# Patient Record
Sex: FEMALE | Race: WHITE | Employment: FULL TIME | URBAN - METROPOLITAN AREA
[De-identification: names, ages, dates, MRNs, and addresses within clinical notes are randomized per-mention and may not be internally consistent; named-entity substitution may affect disease eponyms.]

---

## 2015-01-01 LAB — PAP SMEAR - HIM PATIENT REPORTED: NEGATIVE

## 2017-01-01 LAB — PAP SMEAR - HIM PATIENT REPORTED: NEGATIVE

## 2017-03-23 ENCOUNTER — MYC MEDICAL ADVICE (OUTPATIENT)
Dept: ENDOCRINOLOGY | Facility: CLINIC | Age: 52
End: 2017-03-23

## 2017-03-23 DIAGNOSIS — E66.9 ADULT-ONSET OBESITY: ICD-10-CM

## 2017-03-24 RX ORDER — PHENTERMINE HYDROCHLORIDE 15 MG/1
15 CAPSULE ORAL DAILY
Qty: 30 CAPSULE | Refills: 5
Start: 2017-03-24 | End: 2017-09-25

## 2017-03-24 NOTE — TELEPHONE ENCOUNTER
OK per Dr. Blackmon to send Rx for Phentermine #30 5 refills. Phentermine can't be refilled with 6 refills.   Carlee Iniguez RN, BSN

## 2017-03-30 ENCOUNTER — MYC MEDICAL ADVICE (OUTPATIENT)
Dept: ENDOCRINOLOGY | Facility: CLINIC | Age: 52
End: 2017-03-30

## 2017-06-03 ENCOUNTER — HEALTH MAINTENANCE LETTER (OUTPATIENT)
Age: 52
End: 2017-06-03

## 2017-09-25 ENCOUNTER — OFFICE VISIT (OUTPATIENT)
Dept: ENDOCRINOLOGY | Facility: CLINIC | Age: 52
End: 2017-09-25

## 2017-09-25 VITALS
SYSTOLIC BLOOD PRESSURE: 127 MMHG | BODY MASS INDEX: 25.5 KG/M2 | HEIGHT: 60 IN | WEIGHT: 129.9 LBS | DIASTOLIC BLOOD PRESSURE: 76 MMHG | HEART RATE: 89 BPM | OXYGEN SATURATION: 100 %

## 2017-09-25 DIAGNOSIS — E66.9 ADULT-ONSET OBESITY: ICD-10-CM

## 2017-09-25 RX ORDER — PHENTERMINE HYDROCHLORIDE 15 MG/1
15 CAPSULE ORAL DAILY
Qty: 30 CAPSULE | Refills: 5 | Status: SHIPPED | OUTPATIENT
Start: 2017-09-25 | End: 2018-03-19

## 2017-09-25 ASSESSMENT — PAIN SCALES - GENERAL: PAINLEVEL: NO PAIN (0)

## 2017-09-25 NOTE — MR AVS SNAPSHOT
After Visit Summary   9/25/2017    Margo Chris    MRN: [id number]           Patient Information     Date Of Birth          1965        Visit Information        Provider Department      9/25/2017 8:15 AM Alvaro Blackmon MD [facility] Medical Weight Management        Today's Diagnoses     Adult-onset obesity           Follow-ups after your visit        Follow-up notes from your care team     Return in about 3 months (around 12/25/2017).      Who to contact     Please call your clinic at [phone] to:    Ask questions about your health    Make or cancel appointments    Discuss your medicines    Learn about your test results    Speak to your doctor   If you have compliments or concerns about an experience at your clinic, or if you wish to file a complaint, please contact [facility] Patient Relations at [phone] or email us at [email or web address]         Additional Information About Your Visit        MyChart Information     invit gives you secure access to your electronic health record. If you see a primary care provider, you can also send messages to your care team and make appointments. If you have questions, please call your primary care clinic.  If you do not have a primary care provider, please call [phone] and they will assist you.      [employer] is an electronic gateway that provides easy, online access to your medical records. With [employer], you can request a clinic appointment, read your test results, renew a prescription or communicate with your care team.     To access your existing account, please contact your [facility] or call [phone] for assistance.        Care EveryWhere ID     This is your Care EveryWhere ID. This could be used by other organizations to access your [facility] medical records  [id number]        Your Vitals Were     Pulse Height Pulse Oximetry BMI (Body Mass Index)           89 1.524 m (5') 100% 25.37 kg/m2         Blood Pressure from Last 3 Encounters:   09/25/17 127/76   09/19/16 128/74   01/22/16 134/82    Weight from Last 3 Encounters:   09/25/17 58.9 kg (129 lb 14.4 oz)   09/19/16 60.6 kg (133 lb 8 oz)   01/22/16 66.2 kg (145 lb 14.4 oz)              Today, you had the following     No orders found for display         Where to get your medicines      Some of these will need a paper prescription and others can be bought over the counter.  Ask your nurse if you have questions.     Bring a paper prescription for each of these medications     phentermine 15 MG capsule          Primary Care Provider    None Specified       No primary provider on file.        Equal Access to Services     NOEMY MARTINEZ : Naun Grissom, kenan mishra, aguilar deal, gopi damian. So [facility] [phone].    ATENCIÓN: Si habla español, tiene a silva disposición servicios gratuitos de asistencia lingüística. Llame al [phone].    We comply with applicable federal civil rights laws and [place] laws. We do not discriminate on the basis of race, color, national origin, age, disability sex, sexual orientation or gender identity.            Thank you!     Thank you for choosing [facility]NAGEMENT  for your care. Our goal is always to provide you with excellent care. Hearing back from our patients is one way we can continue to improve our services. Please take a few minutes to complete the written survey that you may receive in the mail after your visit with us. Thank you!             Your Updated Medication List - Protect others around you: Learn how to safely use, store and throw away your medicines at www.disposemymeds.org.          This list is accurate as of: 9/25/17  9:05 AM.  Always use your most recent med list.                   Brand Name Dispense Instructions for use Diagnosis    phentermine 15 MG capsule     30 capsule    Take  1 capsule (15 mg) by mouth daily    Adult-onset obesity

## 2017-09-25 NOTE — PROGRESS NOTES
Return Medical Weight Management Note     Margo Chris  MRN:  [id number]  :  1965  RAUL:  2017    Dear Colleague,    I had the pleasure of seeing your patient Margo Chris.  She is a 50 year old female who I am continuing to see for treatment of obesity related to:Hyperlipidemia    CURRENT WEIGHT:   129 lbs 14.4 oz    Wt Readings from Last 4 Encounters:   17 58.9 kg (129 lb 14.4 oz)   16 60.6 kg (133 lb 8 oz)   16 66.2 kg (145 lb 14.4 oz)   09/18/15 69.6 kg (153 lb 6.4 oz)     Body Mass Index:  Body mass index is 25.37 kg/(m^2).  Vitals:  B/P: 134/82, P: 83    Initial consult weight was 163 on 12/3/10.  Weight change since last seen on 16 is down 4 pounds.   Total loss is 22 pounds.    INTERVAL HISTORY:  Patient has been working on the following dietary changes:  Goal 1200 sarah and feels does better on phentermine; she feels main effect of phentermine has been to increase her metabolism.   Patient has been working on the following activity changes: works out 4-5 days a week 30 min if rigorous and 45 min if moderate    Diet and Activity Changes Since Last Visit Reviewed With Patient 2017   I have made the following changes to my diet since my last visit: none   With regards to my diet, I am still struggling with: nothing   For breakfast, I typically eat: PB&J on whole wheat   For lunch, I typically eat: Lean Cuisine   For supper, I typically eat: Salad   For snack(s), I typically eat: greek yogurt   I have made the following changes to my activity/exercise since my last visit: yoga   With regards to my activity/exercise, I am still struggling with: nothing     MEDICATIONS:   Current Outpatient Prescriptions   Medication     phentermine 15 MG capsule     No current facility-administered medications for this visit.        Weight Loss Medication History Reviewed With Patient 2017   Which weight loss medications are you currently taking on a regular basis?   Phentermine   Are you having any side effects from the weight loss medication that we have prescribed you? No       ASSESSMENT:   She feels in good control on phentermine, no AEs, wants to continue.    FOLLOW-UP:    6 months.  10/15 minutes spent on counseling and education     Sincerely,    Alvaro Blackmon MD

## 2017-09-25 NOTE — LETTER
2017       RE: Margo Chris  [address]   [place] [address]     Dear Colleague,    Thank you for referring your patient, Margo Chris, to the [facility] at [facility]. Please see a copy of my visit note below.        Return Medical Weight Management Note     Margo Chris  MRN:  [id number]  :  1965  RAUL:  2017    Dear Colleague,    I had the pleasure of seeing your patient Margo Chris.  She is a 50 year old female who I am continuing to see for treatment of obesity related to:Hyperlipidemia    CURRENT WEIGHT:   129 lbs 14.4 oz    Wt Readings from Last 4 Encounters:   17 58.9 kg (129 lb 14.4 oz)   16 60.6 kg (133 lb 8 oz)   16 66.2 kg (145 lb 14.4 oz)   09/18/15 69.6 kg (153 lb 6.4 oz)     Body Mass Index:  Body mass index is 25.37 kg/(m^2).  Vitals:  B/P: 134/82, P: 83    Initial consult weight was 163 on 12/3/10.  Weight change since last seen on 16 is down 4 pounds.   Total loss is 22 pounds.    INTERVAL HISTORY:  Patient has been working on the following dietary changes:  Goal 1200 sarah and feels does better on phentermine; she feels main effect of phentermine has been to increase her metabolism.   Patient has been working on the following activity changes: works out 4-5 days a week 30 min if rigorous and 45 min if moderate    Diet and Activity Changes Since Last Visit Reviewed With Patient 2017   I have made the following changes to my diet since my last visit: none   With regards to my diet, I am still struggling with: nothing   For breakfast, I typically eat: PB&J on whole wheat   For lunch, I typically eat: Lean Cuisine   For supper, I typically eat: Salad   For snack(s), I typically eat: greek yogurt   I have made the following changes to my activity/exercise since my last visit: yoga   With regards to my activity/exercise, I am still struggling with: nothing      MEDICATIONS:   Current Outpatient Prescriptions   Medication     phentermine 15 MG capsule     No current facility-administered medications for this visit.        Weight Loss Medication History Reviewed With Patient 9/11/2017   Which weight loss medications are you currently taking on a regular basis?  Phentermine   Are you having any side effects from the weight loss medication that we have prescribed you? No       ASSESSMENT:   She feels in good control on phentermine, no AEs, wants to continue.    FOLLOW-UP:    6 months.  10/15 minutes spent on counseling and education     Sincerely,    Alvaro Blackmon MD    Again, thank you for allowing me to participate in the care of your patient.      Sincerely,    Alvaro Blackmon MD

## 2017-09-25 NOTE — NURSING NOTE
(   Chief Complaint   Patient presents with     Refill Request     prescription refill     )    ( Weight: 58.9 kg (129 lb 14.4 oz) )  ( Height: 152.4 cm (5') )  ( BMI (Calculated): 25.42 )  (   )  (   )  (   )  (   )  (   )  (   )    ( BP: 127/76 )  (   )  (   )  (   )  ( Pulse: 89 )  (   )  ( SpO2: 100 % )    (   Patient Active Problem List   Diagnosis     Chronic fatigue syndrome     Hyperlipidemia     Obesity    )  (   Current Outpatient Prescriptions   Medication Sig Dispense Refill     phentermine 15 MG capsule Take 1 capsule (15 mg) by mouth daily 30 capsule 5    )  ( Diabetes Eval:    )    ( Pain Eval:  No Pain (0) )    ( Wound Eval:       )    (   History   Smoking Status     Never Smoker   Smokeless Tobacco     Not on file    )    ( Signed By:  Peter Canela; September 25, 2017; 8:21 AM )

## 2017-09-25 NOTE — LETTER
Date:September 26, 2017      Patient was self referred, no letter generated. Do not send.        [facility] Health Information

## 2018-01-08 ENCOUNTER — TRANSFERRED RECORDS (OUTPATIENT)
Dept: HEALTH INFORMATION MANAGEMENT | Facility: CLINIC | Age: 53
End: 2018-01-08

## 2018-02-15 RX ORDER — MULTIPLE VITAMINS W/ MINERALS TAB 9MG-400MCG
1 TAB ORAL DAILY
Status: ON HOLD | COMMUNITY
End: 2018-02-16

## 2018-02-15 NOTE — OR NURSING
Patient on phentermine. Spoke with Dr. Connelly and ok to proceed with surgery for tomorrow even if hasn't been stopped.

## 2018-02-16 ENCOUNTER — ANESTHESIA (OUTPATIENT)
Dept: SURGERY | Facility: CLINIC | Age: 53
End: 2018-02-16
Payer: COMMERCIAL

## 2018-02-16 ENCOUNTER — SURGERY (OUTPATIENT)
Age: 53
End: 2018-02-16
Payer: COMMERCIAL

## 2018-02-16 ENCOUNTER — ANESTHESIA EVENT (OUTPATIENT)
Dept: SURGERY | Facility: CLINIC | Age: 53
End: 2018-02-16
Payer: COMMERCIAL

## 2018-02-16 ENCOUNTER — HOSPITAL ENCOUNTER (OUTPATIENT)
Facility: CLINIC | Age: 53
Discharge: HOME | End: 2018-02-17
Attending: OBSTETRICS & GYNECOLOGY | Admitting: OBSTETRICS & GYNECOLOGY
Payer: COMMERCIAL

## 2018-02-16 DIAGNOSIS — Z90.710 S/P LAPAROSCOPIC ASSISTED VAGINAL HYSTERECTOMY (LAVH): Primary | ICD-10-CM

## 2018-02-16 LAB
ABO + RH BLD: NORMAL
ABO + RH BLD: NORMAL
BLD GP AB SCN SERPL QL: NORMAL
BLOOD BANK CMNT PATIENT-IMP: NORMAL
HCG UR QL: NEGATIVE
HGB BLD-MCNC: 13.8 G/DL (ref 11.7–15.7)
SPECIMEN EXP DATE BLD: NORMAL

## 2018-02-16 PROCEDURE — 86901 BLOOD TYPING SEROLOGIC RH(D): CPT | Performed by: OBSTETRICS & GYNECOLOGY

## 2018-02-16 PROCEDURE — 25000128 H RX IP 250 OP 636

## 2018-02-16 PROCEDURE — 25000128 H RX IP 250 OP 636: Performed by: OBSTETRICS & GYNECOLOGY

## 2018-02-16 PROCEDURE — 27210794 ZZH OR GENERAL SUPPLY STERILE: Performed by: OBSTETRICS & GYNECOLOGY

## 2018-02-16 PROCEDURE — 25000125 ZZHC RX 250: Performed by: NURSE ANESTHETIST, CERTIFIED REGISTERED

## 2018-02-16 PROCEDURE — 25000132 ZZH RX MED GY IP 250 OP 250 PS 637: Performed by: OBSTETRICS & GYNECOLOGY

## 2018-02-16 PROCEDURE — 37000009 ZZH ANESTHESIA TECHNICAL FEE, EACH ADDTL 15 MIN: Performed by: OBSTETRICS & GYNECOLOGY

## 2018-02-16 PROCEDURE — 86850 RBC ANTIBODY SCREEN: CPT | Performed by: OBSTETRICS & GYNECOLOGY

## 2018-02-16 PROCEDURE — 37000008 ZZH ANESTHESIA TECHNICAL FEE, 1ST 30 MIN: Performed by: OBSTETRICS & GYNECOLOGY

## 2018-02-16 PROCEDURE — 88307 TISSUE EXAM BY PATHOLOGIST: CPT | Mod: 26 | Performed by: OBSTETRICS & GYNECOLOGY

## 2018-02-16 PROCEDURE — 40000170 ZZH STATISTIC PRE-PROCEDURE ASSESSMENT II: Performed by: OBSTETRICS & GYNECOLOGY

## 2018-02-16 PROCEDURE — 25000128 H RX IP 250 OP 636: Performed by: ANESTHESIOLOGY

## 2018-02-16 PROCEDURE — 25000566 ZZH SEVOFLURANE, EA 15 MIN: Performed by: OBSTETRICS & GYNECOLOGY

## 2018-02-16 PROCEDURE — 40000936 ZZH STATISTIC OUTPATIENT (NON-OBS) NIGHT

## 2018-02-16 PROCEDURE — 86900 BLOOD TYPING SEROLOGIC ABO: CPT | Performed by: OBSTETRICS & GYNECOLOGY

## 2018-02-16 PROCEDURE — 88307 TISSUE EXAM BY PATHOLOGIST: CPT | Performed by: OBSTETRICS & GYNECOLOGY

## 2018-02-16 PROCEDURE — 25800025 ZZH RX 258: Performed by: OBSTETRICS & GYNECOLOGY

## 2018-02-16 PROCEDURE — 71000012 ZZH RECOVERY PHASE 1 LEVEL 1 FIRST HR: Performed by: OBSTETRICS & GYNECOLOGY

## 2018-02-16 PROCEDURE — 81025 URINE PREGNANCY TEST: CPT | Performed by: OBSTETRICS & GYNECOLOGY

## 2018-02-16 PROCEDURE — 36415 COLL VENOUS BLD VENIPUNCTURE: CPT | Performed by: OBSTETRICS & GYNECOLOGY

## 2018-02-16 PROCEDURE — 36000093 ZZH SURGERY LEVEL 4 1ST 30 MIN: Performed by: OBSTETRICS & GYNECOLOGY

## 2018-02-16 PROCEDURE — 25000128 H RX IP 250 OP 636: Performed by: NURSE ANESTHETIST, CERTIFIED REGISTERED

## 2018-02-16 PROCEDURE — 40000935 ZZH STATISTIC OUTPATIENT (NON-OBS) EVE

## 2018-02-16 PROCEDURE — 40000934 ZZH STATISTIC OUTPATIENT (NON-OBS) DAY

## 2018-02-16 PROCEDURE — 85018 HEMOGLOBIN: CPT | Performed by: OBSTETRICS & GYNECOLOGY

## 2018-02-16 PROCEDURE — 36000063 ZZH SURGERY LEVEL 4 EA 15 ADDTL MIN: Performed by: OBSTETRICS & GYNECOLOGY

## 2018-02-16 PROCEDURE — 27210995 ZZH RX 272: Performed by: OBSTETRICS & GYNECOLOGY

## 2018-02-16 PROCEDURE — 71000013 ZZH RECOVERY PHASE 1 LEVEL 1 EA ADDTL HR: Performed by: OBSTETRICS & GYNECOLOGY

## 2018-02-16 RX ORDER — PHENAZOPYRIDINE HYDROCHLORIDE 200 MG/1
200 TABLET, FILM COATED ORAL ONCE
Status: COMPLETED | OUTPATIENT
Start: 2018-02-16 | End: 2018-02-16

## 2018-02-16 RX ORDER — METOCLOPRAMIDE HYDROCHLORIDE 5 MG/ML
10 INJECTION INTRAMUSCULAR; INTRAVENOUS EVERY 6 HOURS PRN
Status: DISCONTINUED | OUTPATIENT
Start: 2018-02-16 | End: 2018-02-17 | Stop reason: HOSPADM

## 2018-02-16 RX ORDER — MEPERIDINE HYDROCHLORIDE 25 MG/ML
12.5 INJECTION INTRAMUSCULAR; INTRAVENOUS; SUBCUTANEOUS
Status: DISCONTINUED | OUTPATIENT
Start: 2018-02-16 | End: 2018-02-16

## 2018-02-16 RX ORDER — ONDANSETRON 4 MG/1
4 TABLET, ORALLY DISINTEGRATING ORAL EVERY 30 MIN PRN
Status: DISCONTINUED | OUTPATIENT
Start: 2018-02-16 | End: 2018-02-16

## 2018-02-16 RX ORDER — SODIUM CHLORIDE, SODIUM LACTATE, POTASSIUM CHLORIDE, CALCIUM CHLORIDE 600; 310; 30; 20 MG/100ML; MG/100ML; MG/100ML; MG/100ML
INJECTION, SOLUTION INTRAVENOUS CONTINUOUS
Status: DISCONTINUED | OUTPATIENT
Start: 2018-02-16 | End: 2018-02-17 | Stop reason: HOSPADM

## 2018-02-16 RX ORDER — OXYCODONE HYDROCHLORIDE 5 MG/1
5-10 TABLET ORAL
Qty: 18 TABLET | Refills: 0 | Status: SHIPPED | OUTPATIENT
Start: 2018-02-16 | End: 2018-09-10

## 2018-02-16 RX ORDER — LIDOCAINE HYDROCHLORIDE 20 MG/ML
INJECTION, SOLUTION INFILTRATION; PERINEURAL PRN
Status: DISCONTINUED | OUTPATIENT
Start: 2018-02-16 | End: 2018-02-16

## 2018-02-16 RX ORDER — PROPOFOL 10 MG/ML
INJECTION, EMULSION INTRAVENOUS CONTINUOUS PRN
Status: DISCONTINUED | OUTPATIENT
Start: 2018-02-16 | End: 2018-02-16

## 2018-02-16 RX ORDER — HYDROXYZINE HYDROCHLORIDE 25 MG/1
25 TABLET, FILM COATED ORAL EVERY 6 HOURS PRN
Status: DISCONTINUED | OUTPATIENT
Start: 2018-02-16 | End: 2018-02-17 | Stop reason: HOSPADM

## 2018-02-16 RX ORDER — OXYCODONE HYDROCHLORIDE 5 MG/1
5-10 TABLET ORAL
Status: DISCONTINUED | OUTPATIENT
Start: 2018-02-16 | End: 2018-02-17 | Stop reason: HOSPADM

## 2018-02-16 RX ORDER — PROCHLORPERAZINE MALEATE 10 MG
10 TABLET ORAL EVERY 6 HOURS PRN
Status: DISCONTINUED | OUTPATIENT
Start: 2018-02-16 | End: 2018-02-17 | Stop reason: HOSPADM

## 2018-02-16 RX ORDER — NALOXONE HYDROCHLORIDE 0.4 MG/ML
.1-.4 INJECTION, SOLUTION INTRAMUSCULAR; INTRAVENOUS; SUBCUTANEOUS
Status: DISCONTINUED | OUTPATIENT
Start: 2018-02-16 | End: 2018-02-17 | Stop reason: HOSPADM

## 2018-02-16 RX ORDER — HYDROMORPHONE HYDROCHLORIDE 1 MG/ML
.3-.5 INJECTION, SOLUTION INTRAMUSCULAR; INTRAVENOUS; SUBCUTANEOUS EVERY 10 MIN PRN
Status: DISCONTINUED | OUTPATIENT
Start: 2018-02-16 | End: 2018-02-16

## 2018-02-16 RX ORDER — FENTANYL CITRATE 50 UG/ML
INJECTION, SOLUTION INTRAMUSCULAR; INTRAVENOUS PRN
Status: DISCONTINUED | OUTPATIENT
Start: 2018-02-16 | End: 2018-02-16

## 2018-02-16 RX ORDER — CEFAZOLIN SODIUM 1 G
1 VIAL (EA) INJECTION SEE ADMIN INSTRUCTIONS
Status: DISCONTINUED | OUTPATIENT
Start: 2018-02-16 | End: 2018-02-16 | Stop reason: HOSPADM

## 2018-02-16 RX ORDER — METOCLOPRAMIDE 10 MG/1
10 TABLET ORAL EVERY 6 HOURS PRN
Status: DISCONTINUED | OUTPATIENT
Start: 2018-02-16 | End: 2018-02-17 | Stop reason: HOSPADM

## 2018-02-16 RX ORDER — NEOSTIGMINE METHYLSULFATE 1 MG/ML
VIAL (ML) INJECTION PRN
Status: DISCONTINUED | OUTPATIENT
Start: 2018-02-16 | End: 2018-02-16

## 2018-02-16 RX ORDER — DEXAMETHASONE SODIUM PHOSPHATE 4 MG/ML
INJECTION, SOLUTION INTRA-ARTICULAR; INTRALESIONAL; INTRAMUSCULAR; INTRAVENOUS; SOFT TISSUE PRN
Status: DISCONTINUED | OUTPATIENT
Start: 2018-02-16 | End: 2018-02-16

## 2018-02-16 RX ORDER — BIOTIN 10 MG
2 TABLET ORAL DAILY
COMMUNITY

## 2018-02-16 RX ORDER — HYDROMORPHONE HYDROCHLORIDE 1 MG/ML
0.2 INJECTION, SOLUTION INTRAMUSCULAR; INTRAVENOUS; SUBCUTANEOUS
Status: DISCONTINUED | OUTPATIENT
Start: 2018-02-16 | End: 2018-02-17 | Stop reason: HOSPADM

## 2018-02-16 RX ORDER — NALOXONE HYDROCHLORIDE 0.4 MG/ML
.1-.4 INJECTION, SOLUTION INTRAMUSCULAR; INTRAVENOUS; SUBCUTANEOUS
Status: DISCONTINUED | OUTPATIENT
Start: 2018-02-16 | End: 2018-02-16

## 2018-02-16 RX ORDER — FENTANYL CITRATE 50 UG/ML
25-50 INJECTION, SOLUTION INTRAMUSCULAR; INTRAVENOUS
Status: DISCONTINUED | OUTPATIENT
Start: 2018-02-16 | End: 2018-02-16 | Stop reason: HOSPADM

## 2018-02-16 RX ORDER — ONDANSETRON 4 MG/1
4 TABLET, ORALLY DISINTEGRATING ORAL EVERY 6 HOURS PRN
Status: DISCONTINUED | OUTPATIENT
Start: 2018-02-16 | End: 2018-02-17 | Stop reason: HOSPADM

## 2018-02-16 RX ORDER — ONDANSETRON 2 MG/ML
4 INJECTION INTRAMUSCULAR; INTRAVENOUS EVERY 30 MIN PRN
Status: DISCONTINUED | OUTPATIENT
Start: 2018-02-16 | End: 2018-02-16

## 2018-02-16 RX ORDER — ONDANSETRON 2 MG/ML
4 INJECTION INTRAMUSCULAR; INTRAVENOUS EVERY 6 HOURS PRN
Status: DISCONTINUED | OUTPATIENT
Start: 2018-02-16 | End: 2018-02-17 | Stop reason: HOSPADM

## 2018-02-16 RX ORDER — ONDANSETRON 2 MG/ML
INJECTION INTRAMUSCULAR; INTRAVENOUS
Status: COMPLETED
Start: 2018-02-16 | End: 2018-02-16

## 2018-02-16 RX ORDER — KETOROLAC TROMETHAMINE 30 MG/ML
INJECTION, SOLUTION INTRAMUSCULAR; INTRAVENOUS PRN
Status: DISCONTINUED | OUTPATIENT
Start: 2018-02-16 | End: 2018-02-16

## 2018-02-16 RX ORDER — HYDROMORPHONE HYDROCHLORIDE 1 MG/ML
INJECTION, SOLUTION INTRAMUSCULAR; INTRAVENOUS; SUBCUTANEOUS
Status: COMPLETED
Start: 2018-02-16 | End: 2018-02-16

## 2018-02-16 RX ORDER — PROPOFOL 10 MG/ML
INJECTION, EMULSION INTRAVENOUS PRN
Status: DISCONTINUED | OUTPATIENT
Start: 2018-02-16 | End: 2018-02-16

## 2018-02-16 RX ORDER — OXYCODONE HYDROCHLORIDE 5 MG/1
5-10 TABLET ORAL
Qty: 18 TABLET | Refills: 0 | Status: SHIPPED | OUTPATIENT
Start: 2018-02-16 | End: 2018-02-16

## 2018-02-16 RX ORDER — MAGNESIUM HYDROXIDE 1200 MG/15ML
LIQUID ORAL PRN
Status: DISCONTINUED | OUTPATIENT
Start: 2018-02-16 | End: 2018-02-16 | Stop reason: HOSPADM

## 2018-02-16 RX ORDER — GLYCOPYRROLATE 0.2 MG/ML
INJECTION, SOLUTION INTRAMUSCULAR; INTRAVENOUS PRN
Status: DISCONTINUED | OUTPATIENT
Start: 2018-02-16 | End: 2018-02-16

## 2018-02-16 RX ORDER — SODIUM CHLORIDE, SODIUM LACTATE, POTASSIUM CHLORIDE, CALCIUM CHLORIDE 600; 310; 30; 20 MG/100ML; MG/100ML; MG/100ML; MG/100ML
INJECTION, SOLUTION INTRAVENOUS CONTINUOUS
Status: DISCONTINUED | OUTPATIENT
Start: 2018-02-16 | End: 2018-02-16

## 2018-02-16 RX ORDER — SODIUM CHLORIDE, SODIUM LACTATE, POTASSIUM CHLORIDE, CALCIUM CHLORIDE 600; 310; 30; 20 MG/100ML; MG/100ML; MG/100ML; MG/100ML
INJECTION, SOLUTION INTRAVENOUS CONTINUOUS
Status: DISCONTINUED | OUTPATIENT
Start: 2018-02-16 | End: 2018-02-16 | Stop reason: HOSPADM

## 2018-02-16 RX ORDER — CEFAZOLIN SODIUM 2 G/100ML
2 INJECTION, SOLUTION INTRAVENOUS
Status: COMPLETED | OUTPATIENT
Start: 2018-02-16 | End: 2018-02-16

## 2018-02-16 RX ORDER — LIDOCAINE 40 MG/G
CREAM TOPICAL
Status: DISCONTINUED | OUTPATIENT
Start: 2018-02-16 | End: 2018-02-17 | Stop reason: HOSPADM

## 2018-02-16 RX ORDER — ONDANSETRON 2 MG/ML
INJECTION INTRAMUSCULAR; INTRAVENOUS PRN
Status: DISCONTINUED | OUTPATIENT
Start: 2018-02-16 | End: 2018-02-16

## 2018-02-16 RX ADMIN — ROCURONIUM BROMIDE 10 MG: 10 INJECTION INTRAVENOUS at 08:23

## 2018-02-16 RX ADMIN — HYDROMORPHONE HYDROCHLORIDE 0.2 MG: 1 INJECTION, SOLUTION INTRAMUSCULAR; INTRAVENOUS; SUBCUTANEOUS at 08:06

## 2018-02-16 RX ADMIN — LIDOCAINE HYDROCHLORIDE 40 MG: 20 INJECTION, SOLUTION INFILTRATION; PERINEURAL at 07:45

## 2018-02-16 RX ADMIN — SODIUM CHLORIDE, POTASSIUM CHLORIDE, SODIUM LACTATE AND CALCIUM CHLORIDE: 600; 310; 30; 20 INJECTION, SOLUTION INTRAVENOUS at 12:49

## 2018-02-16 RX ADMIN — FENTANYL CITRATE 50 MCG: 50 INJECTION, SOLUTION INTRAMUSCULAR; INTRAVENOUS at 10:26

## 2018-02-16 RX ADMIN — ONDANSETRON 4 MG: 2 INJECTION INTRAMUSCULAR; INTRAVENOUS at 08:56

## 2018-02-16 RX ADMIN — FENTANYL CITRATE 100 MCG: 50 INJECTION, SOLUTION INTRAMUSCULAR; INTRAVENOUS at 07:45

## 2018-02-16 RX ADMIN — SODIUM CHLORIDE, POTASSIUM CHLORIDE, SODIUM LACTATE AND CALCIUM CHLORIDE: 600; 310; 30; 20 INJECTION, SOLUTION INTRAVENOUS at 07:39

## 2018-02-16 RX ADMIN — SODIUM CHLORIDE 1000 ML: 900 IRRIGANT IRRIGATION at 08:00

## 2018-02-16 RX ADMIN — PROCHLORPERAZINE EDISYLATE 10 MG: 5 INJECTION INTRAMUSCULAR; INTRAVENOUS at 12:48

## 2018-02-16 RX ADMIN — ROCURONIUM BROMIDE 10 MG: 10 INJECTION INTRAVENOUS at 08:50

## 2018-02-16 RX ADMIN — FENTANYL CITRATE 25 MCG: 50 INJECTION, SOLUTION INTRAMUSCULAR; INTRAVENOUS at 09:23

## 2018-02-16 RX ADMIN — PROPOFOL 50 MCG/KG/MIN: 10 INJECTION, EMULSION INTRAVENOUS at 07:46

## 2018-02-16 RX ADMIN — PHENAZOPYRIDINE HYDROCHLORIDE 200 MG: 200 TABLET ORAL at 06:24

## 2018-02-16 RX ADMIN — NEOSTIGMINE METHYLSULFATE 3 MG: 1 INJECTION INTRAMUSCULAR; INTRAVENOUS; SUBCUTANEOUS at 09:33

## 2018-02-16 RX ADMIN — HYDROMORPHONE HYDROCHLORIDE 0.3 MG: 1 INJECTION, SOLUTION INTRAMUSCULAR; INTRAVENOUS; SUBCUTANEOUS at 07:53

## 2018-02-16 RX ADMIN — FENTANYL CITRATE 50 MCG: 50 INJECTION, SOLUTION INTRAMUSCULAR; INTRAVENOUS at 10:48

## 2018-02-16 RX ADMIN — HYDROMORPHONE HYDROCHLORIDE 0.2 MG: 1 INJECTION, SOLUTION INTRAMUSCULAR; INTRAVENOUS; SUBCUTANEOUS at 14:05

## 2018-02-16 RX ADMIN — SODIUM CHLORIDE, POTASSIUM CHLORIDE, SODIUM LACTATE AND CALCIUM CHLORIDE: 600; 310; 30; 20 INJECTION, SOLUTION INTRAVENOUS at 09:04

## 2018-02-16 RX ADMIN — ONDANSETRON 4 MG: 2 INJECTION INTRAMUSCULAR; INTRAVENOUS at 11:50

## 2018-02-16 RX ADMIN — OXYCODONE HYDROCHLORIDE 5 MG: 5 TABLET ORAL at 22:32

## 2018-02-16 RX ADMIN — FENTANYL CITRATE 50 MCG: 50 INJECTION, SOLUTION INTRAMUSCULAR; INTRAVENOUS at 11:08

## 2018-02-16 RX ADMIN — HYDROMORPHONE HYDROCHLORIDE 0.5 MG: 1 INJECTION, SOLUTION INTRAMUSCULAR; INTRAVENOUS; SUBCUTANEOUS at 11:51

## 2018-02-16 RX ADMIN — ROCURONIUM BROMIDE 40 MG: 10 INJECTION INTRAVENOUS at 07:45

## 2018-02-16 RX ADMIN — CEFAZOLIN SODIUM 2 G: 2 INJECTION, SOLUTION INTRAVENOUS at 07:51

## 2018-02-16 RX ADMIN — VASOPRESSIN 10 ML: 20 INJECTION INTRAMUSCULAR; SUBCUTANEOUS at 08:25

## 2018-02-16 RX ADMIN — MIDAZOLAM 2 MG: 1 INJECTION INTRAMUSCULAR; INTRAVENOUS at 07:40

## 2018-02-16 RX ADMIN — KETOROLAC TROMETHAMINE 30 MG: 30 INJECTION, SOLUTION INTRAMUSCULAR at 09:40

## 2018-02-16 RX ADMIN — GLYCOPYRROLATE 0.4 MG: 0.2 INJECTION, SOLUTION INTRAMUSCULAR; INTRAVENOUS at 09:33

## 2018-02-16 RX ADMIN — PROPOFOL 180 MG: 10 INJECTION, EMULSION INTRAVENOUS at 07:45

## 2018-02-16 RX ADMIN — METOCLOPRAMIDE 10 MG: 5 INJECTION, SOLUTION INTRAMUSCULAR; INTRAVENOUS at 14:18

## 2018-02-16 RX ADMIN — DEXAMETHASONE SODIUM PHOSPHATE 4 MG: 4 INJECTION, SOLUTION INTRA-ARTICULAR; INTRALESIONAL; INTRAMUSCULAR; INTRAVENOUS; SOFT TISSUE at 07:55

## 2018-02-16 NOTE — IP AVS SNAPSHOT
[facility]    [address] [place] [id number]    Phone:  [phone]                                       After Visit Summary   2/16/2018    Margo Chris    MRN: [id number]           After Visit Summary Signature Page     I have received my discharge instructions, and my questions have been answered. I have discussed any challenges I see with this plan with the nurse or doctor.    ..........................................................................................................................................  Patient/Patient Representative Signature      ..........................................................................................................................................  Patient Representative Print Name and Relationship to Patient    ..................................................               ................................................  Date                                            Time    ..........................................................................................................................................  Reviewed by Signature/Title    ...................................................              ..............................................  Date                                                            Time

## 2018-02-16 NOTE — INTERVAL H&P NOTE
See preop H&P.  51yo woman with menorrhagia and dysmenorrhea presents for LAVH, bilateral salpingectomy.  She understands all of her options.  She understands R/B/A and desires to proceed. She would like to go home today if possible./Ismael

## 2018-02-16 NOTE — BRIEF OP NOTE
[facility] Brief Operative Note    Pre-operative diagnosis: Menorrhagia; dysmenorrhea   Post-operative diagnosis same   Procedure: Procedure(s):  LAPAROSCOPIC ASSISTED VAGINAL HYSTERECTOMY BILATERAL SALPINGECTOMY  - Wound Class: II-Clean Contaminated   - Wound Class: II-Clean Contaminated   Surgeon(s): Surgeon(s) and Role:     * Hiwot Tim MD - Primary   Asst:  Benji   Estimated blood loss: 25 mL    Specimens:   ID Type Source Tests Collected by Time Destination   A :  Tissue Uterus, Cervix and Bilateral Fallopian Tubes SURGICAL PATHOLOGY EXAM Hiwot Tim MD 2/16/2018  8:45 AM       Findings: Cvx descended to lower 1/3rd aspect of vaginal; normal pelvis; Odilia used in the pelvis; normal appearing appendix; 3 laparoscopic sites - umbilicus, LLQ, RLQ; bleeding from RLQ and LLQ sites requiring interupted sutures of 4-0 vicryl  Drains ames  Complications none  Pt tolerated well.  She is interested in same day discharge if possible./Ismael

## 2018-02-16 NOTE — OR NURSING
Report called to Adriana WELLS. Patient transferred to observation #12 on cart with belongings. No DC meds present. Son notified of transfer.

## 2018-02-16 NOTE — PROGRESS NOTES
Pt dangled at this time. States cramps are tolerable. Encouraged to drink PO and take off F.cath. States does not feel that needs tro come out as she does not feel at baseline to take ot off. Offered education and states that needs some more time. Will continue to monitor. Family at bedside.

## 2018-02-16 NOTE — ANESTHESIA POSTPROCEDURE EVALUATION
Patient: Margo Chris    Procedure(s):  LAPAROSCOPIC ASSISTED VAGINAL HYSTERECTOMY BILATERAL SALPINGECTOMY  - Wound Class: II-Clean Contaminated   - Wound Class: II-Clean Contaminated    Diagnosis:ABNORMAL UTERINE BLEEDING  Diagnosis Additional Information: No value filed.    Anesthesia Type:  General, ETT    Note:  Anesthesia Post Evaluation    Patient location during evaluation: PACU  Patient participation: Able to fully participate in evaluation  Level of consciousness: awake  Pain management: adequate  Airway patency: patent  Cardiovascular status: acceptable  Respiratory status: acceptable  Hydration status: acceptable  PONV: none     Anesthetic complications: None          Last vitals:  Vitals:    02/16/18 1300 02/16/18 1330 02/16/18 1400   BP: 148/71 151/73 153/75   Resp: 15 11 11   Temp: 36.2  C (97.2  F) 36.3  C (97.4  F) 36.2  C (97.2  F)   SpO2: 100% 99% 99%         Electronically Signed By: ALEXUS AGOSTO MD  February 16, 2018  2:37 PM

## 2018-02-16 NOTE — ANESTHESIA CARE TRANSFER NOTE
Patient: Margo Chris    Procedure(s):  LAPAROSCOPIC ASSISTED VAGINAL HYSTERECTOMY BILATERAL SALPINGECTOMY  - Wound Class: II-Clean Contaminated   - Wound Class: II-Clean Contaminated    Diagnosis: ABNORMAL UTERINE BLEEDING  Diagnosis Additional Information: No value filed.    Anesthesia Type:   General, ETT     Note:  Airway :Face Mask  Patient transferred to:PACU  Handoff Report: Identifed the Patient, Identified the Reponsible Provider, Reviewed the pertinent medical history, Discussed the surgical course, Reviewed Intra-OP anesthesia mangement and issues during anesthesia, Set expectations for post-procedure period and Allowed opportunity for questions and acknowledgement of understanding      Vitals: (Last set prior to Anesthesia Care Transfer)    CRNA VITALS  2/16/2018 0918 - 2/16/2018 0954      2/16/2018             NIBP: 142/78    NIBP Mean: 99                Electronically Signed By: KEVIN Mehta CRNA  February 16, 2018  9:54 AM

## 2018-02-16 NOTE — PROGRESS NOTES
List all Outpatient in a Bed discharge goals to be met before discharge home:   ~ Patient able to ambulate as they were prior to admission or with assist devices provided by therapies during their stay: Not met  ~ Nurse to Notify Provider when Outpatient in a Bed discharge goals have been met and patient is ready for discharge: Not met

## 2018-02-16 NOTE — DISCHARGE INSTRUCTIONS
While you were at the hospital today you were given a medication called Pyridium.  This is used to treat pain, burning, increased urination, and increased urge to urinate.    Pyridium will most likely darken the color of your urine to an orange or red color. Darkened urine may also cause stains to your underwear, which may or may not be removed by laundering.

## 2018-02-16 NOTE — PLAN OF CARE
Problem: Patient Care Overview  Goal: Plan of Care/Patient Progress Review  Outcome: No Change  A/Ox4. VSS on RA. IV infusing 100 ml/h. Capno. Baltazar intact. Pt has had persistent nausea. Pt unable to advance from ice chips/sips of water yet. Zofran x1, Compazine x1, and Reglan x1 given. Pain managed with IV dilaudid given x2 and ice packs. Lap sites x3 CDI. Discharge pending. Nursing will continue to monitor.

## 2018-02-16 NOTE — IP AVS SNAPSHOT
MRN:[id number]                      After Visit Summary   2/16/2018    Margo Chris    MRN: [id number]           Thank you!     Thank you for choosing [facility] for your care. Our goal is always to provide you with excellent care. Hearing back from our patients is one way we can continue to improve our services. Please take a few minutes to complete the written survey that you may receive in the mail after you visit with us. Thank you!        Patient Information     Date Of Birth          1965        Designated Caregiver       Most Recent Value    Caregiver    Will someone help with your care after discharge? yes    Name of designated caregiver Hunter    Phone number of caregiver [phone]      About your hospital stay     You were admitted on:  February 16, 2018 You last received care in the:  [facility]    You were discharged on:  February 17, 2018       Who to Call     For medical emergencies, please call 911.  For non-urgent questions about your medical care, please call your primary care provider or clinic, [phone]  For questions related to your surgery, please call your surgery clinic        Attending Provider     Provider Specialty    Jenna Ma MD OB/Gyn    Meeta, Hiwot Miller MD OB/Gyn       Primary Care Provider Office Phone # Fax #    Hiwot Tim [phone]      After Care Instructions     Diet Instructions       Resume pre-procedure diet            Discharge Instructions       Patient to follow up with appointment in 2 weeks            Discharge Instructions       Nothing in vagina for 6wks            Do not - immerse incision in water until sutures removed       Do not immerse incision in water until sutures removed            Ice to affected area       Ice to operative site PRN            No lifting        No lifting over 20 lbs and no strenuous physical activity for 6 weeks            Shower       No shower for 24  "hours post procedure. May shower Postoperative Day (POD)  1                  Further instructions from your care team       While you were at the hospital today you were given a medication called Pyridium.  This is used to treat pain, burning, increased urination, and increased urge to urinate.    Pyridium will most likely darken the color of your urine to an orange or red color. Darkened urine may also cause stains to your underwear, which may or may not be removed by laundering.        Pending Results     Date and Time Order Name Status Description    2/16/2018 0855 Surgical pathology exam In process             Statement of Approval     Ordered          02/16/18 1009  I have reviewed and agree with all the recommendations and orders detailed in this document.  EFFECTIVE NOW     Comments:  Discharge to home later today if able to void, ambulate without difficulty, tolerate po intake and pain under control/LBakerMD   Approved and electronically signed by:  Hiwot Tim MD             Admission Information     Date & Time Provider Department Dept. Phone    2/16/2018 Hiwot Tim MD [facility] [phone]      Your Vitals Were     Blood Pressure Temperature Respirations Height Weight Last Period    135/72 98.7  F (37.1  C) (Oral) 16 1.537 m (5' 0.5\") 60 kg (132 lb 3.2 oz) 01/26/2018 (Approximate)    Pulse Oximetry BMI (Body Mass Index)                94% 25.39 kg/m2          [employer] Information     [employer] gives you secure access to your electronic health record. If you see a primary care provider, you can also send messages to your care team and make appointments. If you have questions, please call your primary care clinic.  If you do not have a primary care provider, please call [phone] and they will assist you.        Care EveryWhere ID     This is your Care EveryWhere ID. This could be used by other organizations to access your [place] medical records  [id number]        Equal " Access to Services     [facility]: Hadii aad ku hadamaliadalton Sarithasamira, wamaryda luqadaha, qaybta kaagnesgopi martinez. So [facility] [phone].    ATENCIÓN: Si habla antonia, tiene a silva disposición servicios gratuitos de asistencia lingüística. Llame al [phone].    We comply with applicable federal civil rights laws and [place] laws. We do not discriminate on the basis of race, color, national origin, age, disability, sex, sexual orientation, or gender identity.               Review of your medicines      START taking        Dose / Directions    * oxyCODONE IR 5 MG tablet   Commonly known as:  ROXICODONE   Used for:  S/P laparoscopic assisted vaginal hysterectomy (LAVH)        Dose:  5-10 mg   Take 1-2 tablets (5-10 mg) by mouth every 3 hours as needed for pain or other (Moderate to Severe)   Quantity:  18 tablet   Refills:  0       * oxyCODONE IR 5 MG tablet   Commonly known as:  ROXICODONE   Used for:  S/P laparoscopic assisted vaginal hysterectomy (LAVH)        Dose:  5-10 mg   Take 1-2 tablets (5-10 mg) by mouth every 3 hours as needed for other (pain control or improvement in physical function. Hold dose for analgesic side effects.)   Quantity:  18 tablet   Refills:  0       * Notice:  This list has 2 medication(s) that are the same as other medications prescribed for you. Read the directions carefully, and ask your doctor or other care provider to review them with you.      CONTINUE these medicines which have NOT CHANGED        Dose / Directions    MULTIVITAMIN ADULT Chew        Dose:  2 chew tab   Take 2 chew tab by mouth daily   Refills:  0       phentermine 15 MG capsule   Used for:  Adult-onset obesity        Dose:  15 mg   Take 1 capsule (15 mg) by mouth daily   Quantity:  30 capsule   Refills:  5            Where to get your medicines      Some of these will need a paper prescription and others can be bought over the counter. Ask your nurse if you have questions.      Bring a paper prescription for each of these medications     oxyCODONE IR 5 MG tablet    oxyCODONE IR 5 MG tablet                Protect others around you: Learn how to safely use, store and throw away your medicines at www.disposemymeds.org.        Information about OPIOIDS     PRESCRIPTION OPIOIDS: WHAT YOU NEED TO KNOW    Prescription opioids can be used to help relieve moderate to severe pain and are often prescribed following a surgery or injury, or for certain health conditions. These medications can be an important part of treatment but also come with serious risks. It is important to work with your health care provider to make sure you are getting the safest, most effective care.    WHAT ARE THE RISKS AND SIDE EFFECTS OF OPIOID USE?  Prescription opioids carry serious risks of addiction and overdose, especially with prolonged use. An opioid overdose, often marked by slowed breathing can cause sudden death. The use of prescription opioids can have a number of side effects as well, even when taken as directed:      Tolerance - meaning you might need to take more of a medication for the same pain relief    Physical dependence - meaning you have symptoms of withdrawal when a medication is stopped    Increased sensitivity to pain    Constipation    Nausea, vomiting, and dry mouth    Sleepiness and dizziness    Confusion    Depression    Low levels of testosterone that can result in lower sex drive, energy, and strength    Itching and sweating    RISKS ARE GREATER WITH:    History of drug misuse, substance use disorder, or overdose    Mental health conditions (such as depression or anxiety)    Sleep apnea    Older age (65 years or older)    Pregnancy    Avoid alcohol while taking prescription opioids.   Also, unless specifically advised by your health care provider, medications to avoid include:    Benzodiazepines (such as Xanax or Valium)    Muscle relaxants (such as Soma or Flexeril)    Hypnotics (such as  Ambien or Lunesta)    Other prescription opioids    KNOW YOUR OPTIONS:  Talk to your health care provider about ways to manage your pain that do not involve prescription opioids. Some of these options may actually work better and have fewer risks and side effects:    Pain relievers such as acetaminophen, ibuprofen, and naproxen    Some medications that are also used for depression or seizures    Physical therapy and exercise    Cognitive behavioral therapy, a psychological, goal-directed approach, in which patients learn how to modify physical, behavioral, and emotional triggers of pain and stress    IF YOU ARE PRESCRIBED OPIOIDS FOR PAIN:    Never take opioids in greater amounts or more often than prescribed    Follow up with your primary health care provider and work together to create a plan on how to manage your pain.    Talk about ways to help manage your pain that do not involve prescription opioids    Talk about all concerns and side effects    Help prevent misuse and abuse    Never sell or share prescription opioids    Never use another person's prescription opioids    Store prescription opioids in a secure place and out of reach of others (this may include visitors, children, friends, and family)    Visit www.cdc.gov/drugoverdose to learn about risks of opioid abuse and overdose    If you believe you may be struggling with addiction, tell your health care provider and ask for guidance or call [facility] National Helpline at 6-850-631-HELP    LEARN MORE / www.cdc.gov/drugoverdose/prescribing/guideline.html    Safely dispose of unused prescription opioids: Find your local drug take-back programs and more information about the importance of safe disposal at www.doseofreality.[place].gov             Medication List: This is a list of all your medications and when to take them. Check marks below indicate your daily home schedule. Keep this list as a reference.      Medications           Morning Afternoon Evening  Bedtime As Needed    MULTIVITAMIN ADULT Chew   Take 2 chew tab by mouth daily                                   * oxyCODONE IR 5 MG tablet   Commonly known as:  ROXICODONE   Take 1-2 tablets (5-10 mg) by mouth every 3 hours as needed for pain or other (Moderate to Severe)   Last time this was given:  5 mg on 2/17/2018  7:58 AM                                   * oxyCODONE IR 5 MG tablet   Commonly known as:  ROXICODONE   Take 1-2 tablets (5-10 mg) by mouth every 3 hours as needed for other (pain control or improvement in physical function. Hold dose for analgesic side effects.)   Last time this was given:  5 mg on 2/17/2018  7:58 AM                                   phentermine 15 MG capsule   Take 1 capsule (15 mg) by mouth daily                                   * Notice:  This list has 2 medication(s) that are the same as other medications prescribed for you. Read the directions carefully, and ask your doctor or other care provider to review them with you.

## 2018-02-16 NOTE — PROGRESS NOTES
Admission medication history interview status for the 2/16/2018  admission is complete. See EPIC admission navigator for prior to admission medications     Medication history source reliability:Good    Medication history interview source(s):Patient    Medication history resources (including written lists, pill bottles, clinic record):None    Primary pharmacy.[facility]    Additional medication history information not noted on PTA med list :None    Time spent in this activity: 45 minutes    Prior to Admission medications    Medication Sig Last Dose Taking? Auth Provider   Multiple Vitamins-Minerals (MULTIVITAMIN ADULT) CHEW Take 2 chew tab by mouth daily 2/13/2018 Yes Reported, Patient   phentermine 15 MG capsule Take 1 capsule (15 mg) by mouth daily 2/14/2018 at am Yes Alvaro Blackmon MD

## 2018-02-16 NOTE — ANESTHESIA PREPROCEDURE EVALUATION
Anesthesia Evaluation     . Pt has not had prior anesthetic            ROS/MED HX    ENT/Pulmonary:  - neg pulmonary ROS    (-) sleep apnea   Neurologic:       Cardiovascular: Comment: Vasovagal with blood draws        METS/Exercise Tolerance:  >4 METS   Hematologic:         Musculoskeletal:         GI/Hepatic: Comment: Gilbert syndrome        (-) GERD   Renal/Genitourinary:         Endo: Comment: Taking phentermine - none for 2 days        Psychiatric:     (+) psychiatric history depression      Infectious Disease:         Malignancy:         Other: Comment: Palpitations with novocaine                    Physical Exam  Normal systems: dental    Airway   Mallampati: II  TM distance: >3 FB  Neck ROM: full    Dental     Cardiovascular   Rhythm and rate: regular and normal      Pulmonary    breath sounds clear to auscultation                    Anesthesia Plan      History & Physical Review  History and physical reviewed and following examination; no interval change.    ASA Status:  2 .    NPO Status:  > 8 hours    Plan for General and ETT with Intravenous induction. Maintenance will be Balanced.    PONV prophylaxis:  Ondansetron (or other 5HT-3) and Dexamethasone or Solumedrol  toradol if ok with surgeon  Type and screen in OR  Dilaudid  Background propofol  Phenylephrine prn hypotension      Postoperative Care      Consents  Anesthetic plan, risks, benefits and alternatives discussed with:  Patient..                          .

## 2018-02-17 VITALS
WEIGHT: 132.2 LBS | RESPIRATION RATE: 16 BRPM | DIASTOLIC BLOOD PRESSURE: 72 MMHG | HEIGHT: 61 IN | OXYGEN SATURATION: 94 % | TEMPERATURE: 98.7 F | SYSTOLIC BLOOD PRESSURE: 135 MMHG | BODY MASS INDEX: 24.96 KG/M2

## 2018-02-17 LAB — HGB BLD-MCNC: 12.4 G/DL (ref 11.7–15.7)

## 2018-02-17 PROCEDURE — 25000132 ZZH RX MED GY IP 250 OP 250 PS 637: Performed by: OBSTETRICS & GYNECOLOGY

## 2018-02-17 PROCEDURE — 36415 COLL VENOUS BLD VENIPUNCTURE: CPT | Performed by: OBSTETRICS & GYNECOLOGY

## 2018-02-17 PROCEDURE — 40000934 ZZH STATISTIC OUTPATIENT (NON-OBS) DAY

## 2018-02-17 PROCEDURE — 85018 HEMOGLOBIN: CPT | Performed by: OBSTETRICS & GYNECOLOGY

## 2018-02-17 RX ORDER — IBUPROFEN 600 MG/1
600 TABLET, FILM COATED ORAL EVERY 6 HOURS PRN
Status: DISCONTINUED | OUTPATIENT
Start: 2018-02-17 | End: 2018-02-17 | Stop reason: HOSPADM

## 2018-02-17 RX ADMIN — OXYCODONE HYDROCHLORIDE 5 MG: 5 TABLET ORAL at 01:26

## 2018-02-17 RX ADMIN — OXYCODONE HYDROCHLORIDE 5 MG: 5 TABLET ORAL at 07:58

## 2018-02-17 RX ADMIN — OXYCODONE HYDROCHLORIDE 5 MG: 5 TABLET ORAL at 11:00

## 2018-02-17 NOTE — PLAN OF CARE
Problem: Patient Care Overview  Goal: Plan of Care/Patient Progress Review  Outcome: Improving  Pt A&Ox4 , VSS, afebrile. CMS intact. C/o  ABD cramping pains managed by PO oxycodone. Denies nausea. Full liquid diet, IVF infusing, SBA. Ambulated in the hallway.  voiding. IV-TKO. PCDs on.  Capno on. Continue to monitor.

## 2018-02-17 NOTE — PROGRESS NOTES
Pt ambulated in the hallway, No dyspnea, SOB or dizziness. Will d/c F.cath. Will continue to monitor.

## 2018-02-17 NOTE — PLAN OF CARE
Problem: Patient Care Overview  Goal: Plan of Care/Patient Progress Review  Outcome: Improving  Care Plan Summary Note: Pt A&Ox4 , VSS, afebrile. CMS intact. 3 lap sites intact. C/o  ABD cramping pains managed by PO oxycodone. Denies nausea. Full liquid diet, IVF infusing, SBA. Ambulated in the hallway. F-cath D/C. Voided 450cc. IV-TKO. PCDs on.  Capno on. Continue to monitor.

## 2018-02-17 NOTE — PLAN OF CARE
Problem: Patient Care Overview  Goal: Plan of Care/Patient Progress Review  Outcome: Adequate for Discharge Date Met: 02/17/18  A&O, VSS. Pain well controlled by oxycodone. Voiding, reg diet. Ind. No vag bleeding. Lap sites CDI. Went over D/C paperwork. All questions answered and paperwork signed. Pt left with all belongings.

## 2018-02-19 LAB — COPATH REPORT: NORMAL

## 2018-02-23 NOTE — OP NOTE
Procedure Date: 02/16/2018      [facility] OBSTETRICS GYNECOLOGY OPERATIVE REPORT:      PREOPERATIVE DIAGNOSES:  Menorrhagia, dysmenorrhea affecting her lifestyle.      POSTOPERATIVE DIAGNOSES:  Menorrhagia, dysmenorrhea affecting her lifestyle, normal pelvis.      PROCEDURES PERFORMED:  Laparoscopic-assisted vaginal hysterectomy, bilateral salpingectomy, Arevalo culdoplasty.      SURGEON:  Hiwot Tim MD      ASSISTANT:  Fabiana Chapin, DENNYN, CST, CFA      ANESTHESIA:  General.      SURGICAL FINDINGS:   1.  Cervix descended to the lower one-third aspect of the vagina.   2.  Normal-appearing uterus, ovaries and fallopian tubes.   3.  Odilia used in the pelvis at the end of the procedure.   4.  Normal-appearing appendix.   5.  3 laparoscopic sites used; one at the umbilicus, one in the left lower quadrant and then finally one in the right lower quadrant.   6.  Bleeding from the right lower quadrant and left lower quadrant sites requiring interrupted sutures of 4-0 Vicryl through the skin.      DESCRIPTION OF PROCEDURE:  The patient was taken to the operating room and given a general anesthetic.  She was appropriately prepped and draped in the dorsal lithotomy position.  Speculum was placed in the vagina, single-tooth tenaculum placed on the anterior lip of the cervix.  Narka cannula was placed in the cervical os.  Speculum was then removed.  My gloves were then changed.  A small vertical infraumbilical skin incision was made with a knife and Veress needle was placed without difficulty.  The saline drop test was appropriate.  Approximately 3 liters of CO2 gas was instilled into the abdomen under low intraperitoneal pressures.  Veress needle was then removed and an atraumatic 5 mm trocar was placed without difficulty.  The laparoscope confirmed I was in the peritoneal cavity, there was no evidence of bowel, bladder or major vessel injury seen.  I required 5 mm InnerDyne Step trocars in the right lower quadrant and left  lower quadrant.  These were placed under direct visualization; again, no evidence of bowel, bladder or major vessel injury seen.  The uterus, ovaries and fallopian tubes appeared normal.  The cul-de-sac was clear.  There was no evidence of pelvic endometriosis.  The left fallopian tube was grasped distally and then using the LigaSure instrument, I was able to grasp, seal and cut across the mesosalpinx starting at the fimbria.  I did this all the way to the fundus but kept the tubes attached to the uterus and this was repeated on the opposite side.        At this point, I proceeded with the vaginal hysterectomy.  The laparoscopic instruments were removed but the ports were left in place.  A sterile drape was placed over the laparoscopic instruments.  I then went down to the vagina, we adjusted the legs accordingly.  The tenaculum and Lattimore cannula were removed.  A weighted speculum was placed in the vagina and Mykel clamps were placed on the anterior and posterior lips of the cervix.  A dilute Pitressin was placed at the cervical vaginal junction and then using the cautery tip, I was able to do an incision at the cervical vaginal junction circumferentially.  An Allis clamp was placed along the anterior vaginal mucosa and then using sharp dissection with Metzenbaum scissors, I was able to dissect the anterior vaginal mucosa and the bladder away from the cervix and the uterus.  I continued with this using blunt and sharp dissection.  I was able to enter the anterior cul-de-sac sharply and then a retractor was placed in this space.  I then went down posteriorly and grasped the posterior vaginal mucosa with an Allis clamp and then with a Babin scissors, I was able to enter the cul-de-sac sharply and I placed a right angle retractor in this space.  Using curved Lily clamps, I was able to clamp the uterosacral ligaments.  I started on the left.  The pedicle was then cut and secured with a Lily suture of 0 Vicryl.  This  was also done on the opposite side and then using a curved Lily clamp I was able to grasp the cardinal ligaments which was then cut and secured with a Lily suture of 0 Vicryl; this was done bilaterally.  The uterine vessels, broad ligaments were taken down in a sequential fashion by clamping with a curved Lily clamp, then cutting and then suture ligating with 0 Vicryl suture.  The utero-ovarian ligaments were grasped with a Lily suture but I avoided grasping the fallopian tube as well since it was attached to the uterus.  I did this on the left.  The pedicle was cut.  First a free tie of 0 Vicryl was placed around this pedicle and then a stick tie was placed around this pedicle.  This was done bilaterally.  The uterus was then free and removed with the fallopian tubes attached.  I inspected all the pedicles and they appeared hemostatic.  A pursestring suture of 2-0 Vicryl was placed on the peritoneum starting at the 12 o'clock position of the bladder peritoneum and then going circumferentially along the bladder peritoneum incorporating the cardinal ligament, the uterosacral ligament, reefing across the cul-de-sac and then similarly the uterosacral ligament, the cardinal ligament on the opposite side and ending at 12:00.  I also placed a Arevalo culdoplasty stitch with #1 Vicryl suture.  I went through the posterior vaginal mucosa into the cul-de-sac, incorporated the left uterosacral ligament, then reefing across the cul-de-sac going through the opposite uterosacral ligament and then exiting midline posterior vaginal mucosa approximately 1 cm away from where I started.  This stitch was tagged.  Adequate hemostasis was obtained.  Once the counts were correct, the peritoneal stitch was tied down.  The vaginal cuff was closed using interrupted figure-of-eight sutures of 0 Vicryl.  I started at each angle and then went toward the midline.  I closed it transversely.  Once it was closed I then tied down the Arevalo  culdoplasty stitch.  Adequate hemostasis was obtained.  The Baltazar catheter was placed.  Vaginal instruments were removed.        I then changed my gloves and went back up laparoscopically, everything appeared hemostatic.  Ureters appeared intact.  Bowel and bladder appeared intact.  Adequate hemostasis was obtained.  I did place Odilia into the pelvis and then procedure was then terminated.  CO2 gas was allowed to escape from the abdomen and all laparoscopic instruments were removed.  Incisions were closed with interrupted sutures of 4-0 Vicryl.  There was persistent bleeding from the right lower quadrant and left lower quadrant skin sites which required interrupted sutures of 4-0 Vicryl through the skin in each location.  This helped reapproximate the skin but also stopped the bleeding.  Steri-Strips were applied.  Band-Aids were applied.  I then went down to the vagina, looked at the cuff again and it looked good.  Adequate hemostasis was obtained.  Estimated blood loss 25 mL.  Sponge and needle counts were reported as correct.  Drains Baltazar.  Complications none.  The patient tolerated the procedure well.  The patient is interested in a same day discharge if possible.         LALA DOSS MD             D: 2018   T: 2018   MT: BROOKE      Name:     COLLEEN PARKINSON   MRN:      [id number]        Account:        [id number]   :      1965           Procedure Date: 2018      Document: [id number]

## 2018-03-16 ENCOUNTER — MYC REFILL (OUTPATIENT)
Dept: ENDOCRINOLOGY | Facility: CLINIC | Age: 53
End: 2018-03-16

## 2018-03-16 DIAGNOSIS — E66.9 ADULT-ONSET OBESITY: ICD-10-CM

## 2018-03-16 RX ORDER — PHENTERMINE HYDROCHLORIDE 15 MG/1
15 CAPSULE ORAL DAILY
Qty: 30 CAPSULE | Refills: 5 | Status: CANCELLED | OUTPATIENT
Start: 2018-03-16

## 2018-03-19 RX ORDER — PHENTERMINE HYDROCHLORIDE 15 MG/1
15 CAPSULE ORAL EVERY MORNING
Qty: 30 CAPSULE | Refills: 5 | COMMUNITY
Start: 2018-03-19 | End: 2018-05-21 | Stop reason: DRUGHIGH

## 2018-03-19 NOTE — TELEPHONE ENCOUNTER
Called in refill of Phentermine to [facility] in [place] per Dr. Blackmon.

## 2018-05-21 DIAGNOSIS — E66.01 MORBID OBESITY (H): Primary | ICD-10-CM

## 2018-05-21 RX ORDER — PHENTERMINE HYDROCHLORIDE 15 MG/1
30 CAPSULE ORAL EVERY MORNING
Qty: 60 CAPSULE | Refills: 5 | Status: SHIPPED | OUTPATIENT
Start: 2018-05-21 | End: 2018-09-10

## 2018-09-10 ENCOUNTER — OFFICE VISIT (OUTPATIENT)
Dept: ENDOCRINOLOGY | Facility: CLINIC | Age: 53
End: 2018-09-10
Payer: COMMERCIAL

## 2018-09-10 VITALS
WEIGHT: 130.9 LBS | TEMPERATURE: 98 F | HEART RATE: 93 BPM | BODY MASS INDEX: 25.7 KG/M2 | DIASTOLIC BLOOD PRESSURE: 85 MMHG | HEIGHT: 60 IN | SYSTOLIC BLOOD PRESSURE: 149 MMHG | OXYGEN SATURATION: 100 %

## 2018-09-10 DIAGNOSIS — E66.01 MORBID OBESITY (H): ICD-10-CM

## 2018-09-10 RX ORDER — PHENTERMINE HYDROCHLORIDE 15 MG/1
30 CAPSULE ORAL EVERY MORNING
Qty: 60 CAPSULE | Refills: 5 | Status: SHIPPED | OUTPATIENT
Start: 2018-09-10 | End: 2019-04-03

## 2018-09-10 ASSESSMENT — PAIN SCALES - GENERAL: PAINLEVEL: NO PAIN (0)

## 2018-09-10 NOTE — MR AVS SNAPSHOT
After Visit Summary   9/10/2018    Margo Chris    MRN: [id number]           Patient Information     Date Of Birth          1965        Visit Information        Provider Department      9/10/2018 11:30 AM Alvaro Blackmon MD  Health Medical Weight Management        Today's Diagnoses     Morbid obesity (H)           Follow-ups after your visit        Follow-up notes from your care team     Return in about 3 months (around 12/10/2018).      Who to contact     Please call your clinic at [phone] to:    Ask questions about your health    Make or cancel appointments    Discuss your medicines    Learn about your test results    Speak to your doctor            Additional Information About Your Visit        [employer] Information     [employer] gives you secure access to your electronic health record. If you see a primary care provider, you can also send messages to your care team and make appointments. If you have questions, please call your primary care clinic.  If you do not have a primary care provider, please call [phone] and they will assist you.      [employer] is an electronic gateway that provides easy, online access to your medical records. With [employer], you can request a clinic appointment, read your test results, renew a prescription or communicate with your care team.     To access your existing account, please contact your [facility] or call [phone] for assistance.        Care EveryWhere ID     This is your Care EveryWhere ID. This could be used by other organizations to access your [place] medical records  [id number]        Your Vitals Were     Pulse Temperature Height Pulse Oximetry BMI (Body Mass Index)       93 98  F (36.7  C) (Oral) 1.524 m (5') 100% 25.56 kg/m2        Blood Pressure from Last 3 Encounters:   09/10/18 149/85   02/17/18 135/72   09/25/17 127/76    Weight from Last 3 Encounters:   09/10/18 59.4 kg (130 lb 14.4 oz)    02/16/18 60 kg (132 lb 3.2 oz)   09/25/17 58.9 kg (129 lb 14.4 oz)              Today, you had the following     No orders found for display         Where to get your medicines      Some of these will need a paper prescription and others can be bought over the counter.  Ask your nurse if you have questions.     Bring a paper prescription for each of these medications     phentermine 15 MG capsule          Primary Care Provider Office Phone # Fax #    Hiwot Tim [phone]       [facility] [address]  [id number]  [facility] [address]        Equal Access to Services     [facility]: Hadii aad ku hadasho Soomaali, waaxda luqadaha, qaybta kaalmada adejohnyada, gopi zepeda . So [facility] [phone].    ATENCIÓN: Si habla español, tiene a silva disposición servicios gratuitos de asistencia lingüística. Llame al [phone].    We comply with applicable federal civil rights laws and [place] laws. We do not discriminate on the basis of race, color, national origin, age, disability, sex, sexual orientation, or gender identity.            Thank you!     Thank you for choosing [facility]NAG[facility]  for your care. Our goal is always to provide you with excellent care. Hearing back from our patients is one way we can continue to improve our services. Please take a few minutes to complete the written survey that you may receive in the mail after your visit with us. Thank you!             Your Updated Medication List - Protect others around you: Learn how to safely use, store and throw away your medicines at www.disposemymeds.org.          This list is accurate as of 9/10/18 11:45 AM.  Always use your most recent med list.                   Brand Name Dispense Instructions for use Diagnosis    MULTIVITAMIN ADULT Chew      Take 2 chew tab by mouth daily        phentermine 15 MG capsule     60 capsule    Take 2 capsules (30 mg) by mouth every morning     Morbid obesity (H)

## 2018-09-10 NOTE — PROGRESS NOTES
Return Medical Weight Management Note     Margo Chris  MRN:  [id number]  :  1965  RAUL:  09/10/18    Dear Colleague,    I had the pleasure of seeing your patient Margo Chris.  She is a 50 year old female who I am continuing to see for treatment of obesity related to:Hyperlipidemia    CURRENT WEIGHT:   130 lbs 14.4 oz    Wt Readings from Last 4 Encounters:   09/10/18 59.4 kg (130 lb 14.4 oz)   18 60 kg (132 lb 3.2 oz)   17 58.9 kg (129 lb 14.4 oz)   16 60.6 kg (133 lb 8 oz)     Body Mass Index:  Body mass index is 25.56 kg/(m^2).  Vitals:  B/P: 134/82, P: 83    Initial consult weight was 163 on 12/3/10.  Weight change since last seen on 2017 is up 1 pounds.   Total loss is 21 pounds.    INTERVAL HISTORY:  Patient has been working on the following dietary changes:  Goal 1200 sarah and feels does better on phentermine; she feels main effect of phentermine has been to increase her metabolism.   Patient has been working on the following activity changes: works out 4-5 days a week 30 min if rigorous and 45 min if moderate    Diet and Activity Changes Since Last Visit Reviewed With Patient 2018   I have made the following changes to my diet since my last visit: None   With regards to my diet, I am still struggling with: n/a   For breakfast, I typically eat: -   For lunch, I typically eat: -   For supper, I typically eat: -   For snack(s), I typically eat: -   I have made the following changes to my activity/exercise since my last visit: none   With regards to my activity/exercise, I am still struggling with: n/a     MEDICATIONS:   Current Outpatient Prescriptions   Medication     Multiple Vitamins-Minerals (MULTIVITAMIN ADULT) CHEW     oxyCODONE IR (ROXICODONE) 5 MG tablet     oxyCODONE IR (ROXICODONE) 5 MG tablet     phentermine 15 MG capsule     No current facility-administered medications for this visit.        Weight Loss Medication History Reviewed With Patient  9/9/2018   Which weight loss medications are you currently taking on a regular basis?  Phentermine   Are you having any side effects from the weight loss medication that we have prescribed you? No       ASSESSMENT:   She feels in good control on phentermine after recent doubling of dose after hysterectomy, no AEs, wants to continue.    FOLLOW-UP:    6 months.  10/15 minutes spent on counseling and education     Sincerely,    Alvaro Blackmon MD

## 2018-09-10 NOTE — NURSING NOTE
Chief Complaint   Patient presents with     Weight Loss     Pt here to discuss medication.        Vitals:    09/10/18 1133   BP: 149/85   Pulse: 93   Temp: 98  F (36.7  C)   TempSrc: Oral   SpO2: 100%   Weight: 130 lb 14.4 oz   Height: 5'       Body mass index is 25.56 kg/(m^2).  Kyle PEREA LPN

## 2018-09-10 NOTE — LETTER
9/10/2018       RE: Margo Chris  [address]  [facility] [place] [address]     Dear Colleague,    Thank you for referring your patient, Margo Chris, to the [facility] MEDICAL WEIGHT MANAGEMENT at [facility]. Please see a copy of my visit note below.        Return Medical Weight Management Note     Margo Chris  MRN:  [id number]  :  1965  RAUL:  09/10/18    Dear Colleague,    I had the pleasure of seeing your patient Margo Chris.  She is a 50 year old female who I am continuing to see for treatment of obesity related to:Hyperlipidemia    CURRENT WEIGHT:   130 lbs 14.4 oz    Wt Readings from Last 4 Encounters:   09/10/18 59.4 kg (130 lb 14.4 oz)   18 60 kg (132 lb 3.2 oz)   17 58.9 kg (129 lb 14.4 oz)   16 60.6 kg (133 lb 8 oz)     Body Mass Index:  Body mass index is 25.56 kg/(m^2).  Vitals:  B/P: 134/82, P: 83    Initial consult weight was 163 on 12/3/10.  Weight change since last seen on 2017 is up 1 pounds.   Total loss is 21 pounds.    INTERVAL HISTORY:  Patient has been working on the following dietary changes:  Goal 1200 sarah and feels does better on phentermine; she feels main effect of phentermine has been to increase her metabolism.   Patient has been working on the following activity changes: works out 4-5 days a week 30 min if rigorous and 45 min if moderate    Diet and Activity Changes Since Last Visit Reviewed With Patient 2018   I have made the following changes to my diet since my last visit: None   With regards to my diet, I am still struggling with: n/a   For breakfast, I typically eat: -   For lunch, I typically eat: -   For supper, I typically eat: -   For snack(s), I typically eat: -   I have made the following changes to my activity/exercise since my last visit: none   With regards to my activity/exercise, I am still struggling with: n/a     MEDICATIONS:   Current Outpatient Prescriptions    Medication     Multiple Vitamins-Minerals (MULTIVITAMIN ADULT) CHEW     oxyCODONE IR (ROXICODONE) 5 MG tablet     oxyCODONE IR (ROXICODONE) 5 MG tablet     phentermine 15 MG capsule     No current facility-administered medications for this visit.        Weight Loss Medication History Reviewed With Patient 9/9/2018   Which weight loss medications are you currently taking on a regular basis?  Phentermine   Are you having any side effects from the weight loss medication that we have prescribed you? No       ASSESSMENT:   She feels in good control on phentermine after recent doubling of dose after hysterectomy, no AEs, wants to continue.    FOLLOW-UP:    6 months.  10/15 minutes spent on counseling and education       Alvaro Blackmon MD

## 2019-04-01 NOTE — TELEPHONE ENCOUNTER
Received refill request from [facility]'s pharmacy for Phentermine. Patient needs appointment prior to refills. Refill denied at this time. Faxed back to pharmacy.

## 2019-04-03 DIAGNOSIS — E66.01 MORBID OBESITY (H): ICD-10-CM

## 2019-04-03 RX ORDER — PHENTERMINE HYDROCHLORIDE 15 MG/1
30 CAPSULE ORAL EVERY MORNING
Qty: 60 CAPSULE | Refills: 5 | Status: SHIPPED | OUTPATIENT
Start: 2019-04-03 | End: 2019-09-16

## 2019-04-03 NOTE — TELEPHONE ENCOUNTER
Received refill request for Phentermine. Will refill and route to Torri Jack for signature, then fax to pharmacy

## 2019-09-16 ENCOUNTER — OFFICE VISIT (OUTPATIENT)
Dept: ENDOCRINOLOGY | Facility: CLINIC | Age: 54
End: 2019-09-16
Payer: COMMERCIAL

## 2019-09-16 VITALS
HEART RATE: 91 BPM | DIASTOLIC BLOOD PRESSURE: 80 MMHG | BODY MASS INDEX: 25.21 KG/M2 | OXYGEN SATURATION: 100 % | HEIGHT: 60 IN | SYSTOLIC BLOOD PRESSURE: 136 MMHG | WEIGHT: 128.4 LBS

## 2019-09-16 DIAGNOSIS — E66.01 MORBID OBESITY (H): ICD-10-CM

## 2019-09-16 RX ORDER — PHENTERMINE HYDROCHLORIDE 15 MG/1
30 CAPSULE ORAL EVERY MORNING
Qty: 60 CAPSULE | Refills: 5 | Status: SHIPPED | OUTPATIENT
Start: 2019-09-16 | End: 2020-03-16

## 2019-09-16 ASSESSMENT — PAIN SCALES - GENERAL: PAINLEVEL: NO PAIN (0)

## 2019-09-16 ASSESSMENT — MIFFLIN-ST. JEOR: SCORE: 1103.92

## 2019-09-16 NOTE — NURSING NOTE
Chief Complaint   Patient presents with     Weight Problem     MWM return m+6       Vitals:    09/16/19 0933   BP: 136/80   BP Location: Left arm   Patient Position: Chair   Cuff Size: Adult Regular   Pulse: 91   SpO2: 100%   Weight: 58.2 kg (128 lb 6.4 oz)   Height: 1.524 m (5')       Body mass index is 25.08 kg/m .    Ibrahima Olvera EMT

## 2019-09-16 NOTE — LETTER
2019       RE: Margo Chris  [address]  [facility] [address]     Dear Colleague,    Thank you for referring your patient, Margo Chris, to the [facility] MEDICAL WEIGHT MANAGEMENT at [facility]. Please see a copy of my visit note below.        Return Medical Weight Management Note     Margo Chris  MRN:  [id number]  :  1965  RAUL:  19    Dear Colleague,    I had the pleasure of seeing your patient Margo Chris.  She is a 50 year old female who I am continuing to see for treatment of obesity related to:Hyperlipidemia    CURRENT WEIGHT:   128 lbs 6.4 oz    Wt Readings from Last 4 Encounters:   19 58.2 kg (128 lb 6.4 oz)   09/10/18 59.4 kg (130 lb 14.4 oz)   18 60 kg (132 lb 3.2 oz)   17 58.9 kg (129 lb 14.4 oz)     Body Mass Index:  Body mass index is 25.08 kg/m .  Vitals:  B/P: 134/82, P: 83    Initial consult weight was 163 on 12/3/10.  Weight change since last seen on 09/10/18 is down 2 pounds.   Total loss is 23 pounds.    INTERVAL HISTORY:  Patient has been working on the following dietary changes:  Goal 1200 sarah and feels does better on phentermine; she feels main effect of phentermine has been to increase her metabolism.   Patient has been working on the following activity changes: works out 4-5 days a week 30 min if rigorous and 45 min if moderate  Diet and Activity Changes Since Last Visit Reviewed With Patient 2019   I have made the following changes to my diet since my last visit: none   With regards to my diet, I am still struggling with: all good   For breakfast, I typically eat: -   For lunch, I typically eat: -   For supper, I typically eat: -   For snack(s), I typically eat: -   I have made the following changes to my activity/exercise since my last visit: none   With regards to my activity/exercise, I am still struggling with: doing fine     MEDICATIONS:   Current Outpatient Medications    Medication     Multiple Vitamins-Minerals (MULTIVITAMIN ADULT) CHEW     phentermine (ADIPEX-P) 15 MG capsule     No current facility-administered medications for this visit.      Weight Loss Medication History Reviewed With Patient 9/14/2019   Which weight loss medications are you currently taking on a regular basis?  Phentermine   Are you having any side effects from the weight loss medication that we have prescribed you? No     ASSESSMENT:   She feels in good control on phentermine after doubling of dose and has long time stable weight now, no AEs, wants to continue.    FOLLOW-UP:    6 months.  10/15 minutes spent on counseling and education     Sincerely,  Alvaro Blackmon MD

## 2019-09-16 NOTE — PROGRESS NOTES
Return Medical Weight Management Note     Margo Chris  MRN:  [id number]  :  1965  RAUL:  19    Dear Colleague,    I had the pleasure of seeing your patient Margo Chris.  She is a 50 year old female who I am continuing to see for treatment of obesity related to:Hyperlipidemia    CURRENT WEIGHT:   128 lbs 6.4 oz    Wt Readings from Last 4 Encounters:   19 58.2 kg (128 lb 6.4 oz)   09/10/18 59.4 kg (130 lb 14.4 oz)   18 60 kg (132 lb 3.2 oz)   17 58.9 kg (129 lb 14.4 oz)     Body Mass Index:  Body mass index is 25.08 kg/m .  Vitals:  B/P: 134/82, P: 83    Initial consult weight was 163 on 12/3/10.  Weight change since last seen on 09/10/18 is down 2 pounds.   Total loss is 23 pounds.    INTERVAL HISTORY:  Patient has been working on the following dietary changes:  Goal 1200 sarah and feels does better on phentermine; she feels main effect of phentermine has been to increase her metabolism.   Patient has been working on the following activity changes: works out 4-5 days a week 30 min if rigorous and 45 min if moderate  Diet and Activity Changes Since Last Visit Reviewed With Patient 2019   I have made the following changes to my diet since my last visit: none   With regards to my diet, I am still struggling with: all good   For breakfast, I typically eat: -   For lunch, I typically eat: -   For supper, I typically eat: -   For snack(s), I typically eat: -   I have made the following changes to my activity/exercise since my last visit: none   With regards to my activity/exercise, I am still struggling with: doing fine     MEDICATIONS:   Current Outpatient Medications   Medication     Multiple Vitamins-Minerals (MULTIVITAMIN ADULT) CHEW     phentermine (ADIPEX-P) 15 MG capsule     No current facility-administered medications for this visit.      Weight Loss Medication History Reviewed With Patient 2019   Which weight loss medications are you currently taking on a  regular basis?  Phentermine   Are you having any side effects from the weight loss medication that we have prescribed you? No     ASSESSMENT:   She feels in good control on phentermine after doubling of dose and has long time stable weight now, no AEs, wants to continue.    FOLLOW-UP:    6 months.  10/15 minutes spent on counseling and education     Sincerely,    Alvaro Blackmon MD

## 2019-09-29 ENCOUNTER — HEALTH MAINTENANCE LETTER (OUTPATIENT)
Age: 54
End: 2019-09-29

## 2020-03-15 ENCOUNTER — HEALTH MAINTENANCE LETTER (OUTPATIENT)
Age: 55
End: 2020-03-15

## 2020-03-16 ENCOUNTER — MYC REFILL (OUTPATIENT)
Dept: ENDOCRINOLOGY | Facility: CLINIC | Age: 55
End: 2020-03-16

## 2020-03-16 DIAGNOSIS — E66.01 MORBID OBESITY (H): ICD-10-CM

## 2020-04-03 NOTE — TELEPHONE ENCOUNTER
Appointment for yearly visit with Dr. Blackmon scheduled in September. Routing refill to Dr. Blackmon for sign off.

## 2020-04-06 RX ORDER — PHENTERMINE HYDROCHLORIDE 15 MG/1
30 CAPSULE ORAL EVERY MORNING
Qty: 60 CAPSULE | Refills: 5 | Status: SHIPPED | OUTPATIENT
Start: 2020-04-06 | End: 2020-09-14

## 2020-07-22 ENCOUNTER — DOCUMENTATION ONLY (OUTPATIENT)
Dept: CARE COORDINATION | Facility: CLINIC | Age: 55
End: 2020-07-22

## 2020-09-14 ENCOUNTER — VIRTUAL VISIT (OUTPATIENT)
Dept: ENDOCRINOLOGY | Facility: CLINIC | Age: 55
End: 2020-09-14
Payer: COMMERCIAL

## 2020-09-14 VITALS — WEIGHT: 120 LBS | HEIGHT: 60 IN | BODY MASS INDEX: 23.56 KG/M2

## 2020-09-14 DIAGNOSIS — E66.01 MORBID OBESITY (H): ICD-10-CM

## 2020-09-14 RX ORDER — PHENTERMINE HYDROCHLORIDE 15 MG/1
30 CAPSULE ORAL EVERY MORNING
Qty: 60 CAPSULE | Refills: 5 | Status: SHIPPED | OUTPATIENT
Start: 2020-09-14 | End: 2021-04-08

## 2020-09-14 ASSESSMENT — MIFFLIN-ST. JEOR: SCORE: 1060.82

## 2020-09-14 ASSESSMENT — PAIN SCALES - GENERAL: PAINLEVEL: NO PAIN (0)

## 2020-09-14 NOTE — LETTER
"9/14/2020       RE: Margo Chris  [address]  [facility] [address]     Dear Colleague,    Thank you for referring your patient, Margo Chris, to the [facility] MEDICAL WEIGHT MANAGEMENT at [facility]. Please see a copy of my visit note below.    Margo Chris is a 55 year old female who is being evaluated via a billable video visit.      The patient has been notified of following:     \"This video visit will be conducted via a call between you and your physician/provider. We have found that certain health care needs can be provided without the need for an in-person physical exam.  This service lets us provide the care you need with a video conversation.  If a prescription is necessary we can send it directly to your pharmacy.  If lab work is needed we can place an order for that and you can then stop by our lab to have the test done at a later time.    Video visits are billed at different rates depending on your insurance coverage.  Please reach out to your insurance provider with any questions.    If during the course of the call the physician/provider feels a video visit is not appropriate, you will not be charged for this service.\"    Patient has given verbal consent for Video visit? Yes  How would you like to obtain your AVS? MyChart  If you are dropped from the video visit, the video invite should be resent to: Text to cell phone: [phone]  Will anyone else be joining your video visit? No    Video-Visit Details    Type of service:  Video Visit    Video call duration: 15 minutes.  I explained the conditions and plans (more than 50% of time was counseling/coordination of weight management).    Originating Location (pt. Location): Home    Distant Location (provider location):  [facility] MEDICAL WEIGHT MANAGEMENT     Platform used for Video Visit: Clinton Blackmon MD              Return Medical Weight Management Note     Margo" CELSO Chris  MRN:  [id number]  :  1965  RAUL:  20    Dear Colleague,    I had the pleasure of seeing your patient Margo Chris.  She is a 50 year old female who I am continuing to see for treatment of obesity related to:Hyperlipidemia    CURRENT WEIGHT:   120 lbs 0 oz    Wt Readings from Last 4 Encounters:   20 54.4 kg (120 lb)   19 58.2 kg (128 lb 6.4 oz)   09/10/18 59.4 kg (130 lb 14.4 oz)   18 60 kg (132 lb 3.2 oz)     Body Mass Index:  Body mass index is 23.44 kg/m .  Vitals:  B/P: 134/82, P: 83    Initial consult weight was 163 on 12/3/10.  Weight change since last seen on 19 is down 8 pounds.   Total loss is 43 pounds.    INTERVAL HISTORY:  Since pandemic has been traveling less and thus eating better. Patient still working on the following dietary changes:  Goal 1200 sarah and feels does better on phentermine; she feels main effect of phentermine has been to increase her metabolism.   Patient has been working on the following activity changes: walking every day    No flowsheet data found.  MEDICATIONS:   Current Outpatient Medications   Medication     Multiple Vitamins-Minerals (MULTIVITAMIN ADULT) CHEW     phentermine (ADIPEX-P) 15 MG capsule     No current facility-administered medications for this visit.      Weight Loss Medication History Reviewed With Patient 2020   Which weight loss medications are you currently taking on a regular basis?  Phentermine   Are you having any side effects from the weight loss medication that we have prescribed you? No     ASSESSMENT:   She feels in good control on phentermine after doubling of dose and has long time stable weight now, no AEs, wants to continue.    FOLLOW-UP:    6 months.  Video call duration: 15 minutes.  I explained the conditions and plans (more than 50% of time was counseling/coordination of weight management).    Sincerely,    Alvaro Blackmon MD     The patient was evaluated via a billable video  "visit and notified \"This visit will be via video call between you and your physician. If lab work is needed we can place an order and you can later stop by the lab. Video visits are billed at different rates depending on your insurance coverage.  Please reach out to your insurance provider with any questions. If the physician feels a video visit is not appropriate, you will not be charged for this service.\" Patient gave verbal consent for Video visit and would you like to obtain AVS by [employer].  "

## 2020-09-14 NOTE — NURSING NOTE
(   Chief Complaint   Patient presents with     Weight Problem    )    ( Weight: 54.4 kg (120 lb)(per pt) )  ( Height: 152.4 cm (5')(per pt) )  ( BMI (Calculated): 23.44 )  (   )  ( Cumulative weight loss (lbs): 43 )  ( Last Visits Weight: 58.2 kg (128 lb 4.9 oz) )  ( Wt change since last visit (lbs): -8.31 )  (   )  (   )    (   )  (   )  (   )  (   )  (   )  (   )  (   )    (   Patient Active Problem List   Diagnosis     Chronic fatigue syndrome     Hyperlipidemia     Obesity     S/P vaginal hysterectomy     Abnormal weight gain     Family history of diabetes mellitus     Gilbert syndrome     Subclinical hypothyroidism     Urticaria    )  (   Current Outpatient Medications   Medication Sig Dispense Refill     Multiple Vitamins-Minerals (MULTIVITAMIN ADULT) CHEW Take 2 chew tab by mouth daily       phentermine (ADIPEX-P) 15 MG capsule Take 2 capsules (30 mg) by mouth every morning 60 capsule 5    )  ( Diabetes Eval:    )    ( Pain Eval:  No Pain (0) )    ( Wound Eval:       )    (   History   Smoking Status     Never Smoker   Smokeless Tobacco     Never Used    )    ( Signed By:  Ibrahima Olvera, EMT; September 14, 2020; 8:27 AM )

## 2020-09-14 NOTE — PROGRESS NOTES
"Margo Chris is a 55 year old female who is being evaluated via a billable video visit.      The patient has been notified of following:     \"This video visit will be conducted via a call between you and your physician/provider. We have found that certain health care needs can be provided without the need for an in-person physical exam.  This service lets us provide the care you need with a video conversation.  If a prescription is necessary we can send it directly to your pharmacy.  If lab work is needed we can place an order for that and you can then stop by our lab to have the test done at a later time.    Video visits are billed at different rates depending on your insurance coverage.  Please reach out to your insurance provider with any questions.    If during the course of the call the physician/provider feels a video visit is not appropriate, you will not be charged for this service.\"    Patient has given verbal consent for Video visit? Yes  How would you like to obtain your AVS? MyChart  If you are dropped from the video visit, the video invite should be resent to: Text to cell phone: [phone]  Will anyone else be joining your video visit? No    Video-Visit Details    Type of service:  Video Visit    Video call duration: 15 minutes.  I explained the conditions and plans (more than 50% of time was counseling/coordination of weight management).    Originating Location (pt. Location): Home    Distant Location (provider location):  [facility] WEIGHT MANAGEMENT     Platform used for Video Visit: Clinton Blackmon MD        "

## 2020-09-14 NOTE — PROGRESS NOTES
Return Medical Weight Management Note     Margo Chris  MRN:  [id number]  :  1965  RAUL:  20    Dear Colleague,    I had the pleasure of seeing your patient Margo Chris.  She is a 50 year old female who I am continuing to see for treatment of obesity related to:Hyperlipidemia    CURRENT WEIGHT:   120 lbs 0 oz    Wt Readings from Last 4 Encounters:   20 54.4 kg (120 lb)   19 58.2 kg (128 lb 6.4 oz)   09/10/18 59.4 kg (130 lb 14.4 oz)   18 60 kg (132 lb 3.2 oz)     Body Mass Index:  Body mass index is 23.44 kg/m .  Vitals:  B/P: 134/82, P: 83    Initial consult weight was 163 on 12/3/10.  Weight change since last seen on 19 is down 8 pounds.   Total loss is 43 pounds.    INTERVAL HISTORY:  Since pandemic has been traveling less and thus eating better. Patient still working on the following dietary changes:  Goal 1200 sarah and feels does better on phentermine; she feels main effect of phentermine has been to increase her metabolism.   Patient has been working on the following activity changes: walking every day    No flowsheet data found.  MEDICATIONS:   Current Outpatient Medications   Medication     Multiple Vitamins-Minerals (MULTIVITAMIN ADULT) CHEW     phentermine (ADIPEX-P) 15 MG capsule     No current facility-administered medications for this visit.      Weight Loss Medication History Reviewed With Patient 2020   Which weight loss medications are you currently taking on a regular basis?  Phentermine   Are you having any side effects from the weight loss medication that we have prescribed you? No     ASSESSMENT:   She feels in good control on phentermine after doubling of dose and has long time stable weight now, no AEs, wants to continue.    FOLLOW-UP:    6 months.  Video call duration: 15 minutes.  I explained the conditions and plans (more than 50% of time was counseling/coordination of weight management).    Sincerely,    Alvaro Blackmon,  "MD     The patient was evaluated via a billable video visit and notified \"This visit will be via video call between you and your physician. If lab work is needed we can place an order and you can later stop by the lab. Video visits are billed at different rates depending on your insurance coverage.  Please reach out to your insurance provider with any questions. If the physician feels a video visit is not appropriate, you will not be charged for this service.\" Patient gave verbal consent for Video visit and would you like to obtain AVS by [employer].  "

## 2021-01-14 ENCOUNTER — HEALTH MAINTENANCE LETTER (OUTPATIENT)
Age: 56
End: 2021-01-14

## 2021-03-25 ENCOUNTER — IMMUNIZATION (OUTPATIENT)
Dept: NURSING | Facility: CLINIC | Age: 56
End: 2021-03-25
Payer: COMMERCIAL

## 2021-03-25 PROCEDURE — 0001A PR COVID VAC PFIZER DIL RECON 30 MCG/0.3 ML IM: CPT

## 2021-03-25 PROCEDURE — 91300 PR COVID VAC PFIZER DIL RECON 30 MCG/0.3 ML IM: CPT

## 2021-04-08 DIAGNOSIS — E66.01 MORBID OBESITY (H): ICD-10-CM

## 2021-04-12 RX ORDER — PHENTERMINE HYDROCHLORIDE 15 MG/1
30 CAPSULE ORAL EVERY MORNING
Qty: 60 CAPSULE | Refills: 5 | Status: SHIPPED | OUTPATIENT
Start: 2021-04-12 | End: 2021-11-05

## 2021-04-15 ENCOUNTER — IMMUNIZATION (OUTPATIENT)
Dept: NURSING | Facility: CLINIC | Age: 56
End: 2021-04-15
Attending: INTERNAL MEDICINE
Payer: COMMERCIAL

## 2021-04-15 PROCEDURE — 0002A PR COVID VAC PFIZER DIL RECON 30 MCG/0.3 ML IM: CPT

## 2021-04-15 PROCEDURE — 91300 PR COVID VAC PFIZER DIL RECON 30 MCG/0.3 ML IM: CPT

## 2021-10-24 ENCOUNTER — HEALTH MAINTENANCE LETTER (OUTPATIENT)
Age: 56
End: 2021-10-24

## 2021-11-04 DIAGNOSIS — E66.01 MORBID OBESITY (H): ICD-10-CM

## 2021-11-04 NOTE — TELEPHONE ENCOUNTER
Can someone please call her? She has her next appt set.    She sent this...    Hello,  I left a message on Tuesday that yesterday was my last day of meds and asked if I could get a refill to bridge the gap until my appointment with Dr Blackmon on 11/15. I didn't receive any message back from the office on Tuesday or Wednesday -- can you please let me know what I should do?     Thank you!  /Margo  [phone] [text is also okay if you prefer]

## 2021-11-05 DIAGNOSIS — E66.01 MORBID OBESITY (H): ICD-10-CM

## 2021-11-05 NOTE — TELEPHONE ENCOUNTER
Reason for call:  Other   Patient called regarding (reason for call): prescription  Additional comments: Patient is requesting a temporary refill of Phentermine, since she is out and her appointment with MD Blackmon isn't until 11/15. Patient is requesting a call back with an update if sent, or with any questions.      Phone number to reach patient:  Home number on file [phone] (home)     Best Time:  Anytime      Can we leave a detailed message on this number?  YES     Travel screening: Not Applicable

## 2021-11-05 NOTE — TELEPHONE ENCOUNTER
phentermine (ADIPEX-P) 15 MG capsule  Last Written Prescription Date:  4/12/2021  Last Fill Quantity: 60,   # refills: 5  Last Office Visit :  9/14/2020  Future Office visit:  11/15/2021    Routing refill request to provider for review/approval because:  Out of med   Drug not on the [facility], [facility] or  Health refill protocol or controlled substance        Elisabet Rincon RN  Central Triage Red Flags/Med Refills

## 2021-11-09 ENCOUNTER — IMMUNIZATION (OUTPATIENT)
Dept: NURSING | Facility: CLINIC | Age: 56
End: 2021-11-09
Payer: COMMERCIAL

## 2021-11-09 PROCEDURE — 0004A PR COVID VAC PFIZER DIL RECON 30 MCG/0.3 ML IM: CPT

## 2021-11-09 PROCEDURE — 91300 PR COVID VAC PFIZER DIL RECON 30 MCG/0.3 ML IM: CPT

## 2021-11-15 ENCOUNTER — VIRTUAL VISIT (OUTPATIENT)
Dept: ENDOCRINOLOGY | Facility: CLINIC | Age: 56
End: 2021-11-15
Payer: COMMERCIAL

## 2021-11-15 VITALS — BODY MASS INDEX: 22.34 KG/M2 | WEIGHT: 113.8 LBS | HEIGHT: 60 IN

## 2021-11-15 DIAGNOSIS — E66.09 CLASS 1 OBESITY DUE TO EXCESS CALORIES WITHOUT SERIOUS COMORBIDITY IN ADULT, UNSPECIFIED BMI: Primary | ICD-10-CM

## 2021-11-15 DIAGNOSIS — E66.811 CLASS 1 OBESITY DUE TO EXCESS CALORIES WITHOUT SERIOUS COMORBIDITY IN ADULT, UNSPECIFIED BMI: Primary | ICD-10-CM

## 2021-11-15 PROCEDURE — 99213 OFFICE O/P EST LOW 20 MIN: CPT | Mod: GT | Performed by: INTERNAL MEDICINE

## 2021-11-15 RX ORDER — PHENTERMINE HYDROCHLORIDE 15 MG/1
30 CAPSULE ORAL EVERY MORNING
Qty: 60 CAPSULE | Refills: 5 | Status: SHIPPED | OUTPATIENT
Start: 2021-11-15 | End: 2021-11-18

## 2021-11-15 ASSESSMENT — MIFFLIN-ST. JEOR: SCORE: 1027.69

## 2021-11-15 ASSESSMENT — PAIN SCALES - GENERAL: PAINLEVEL: NO PAIN (0)

## 2021-11-15 NOTE — PROGRESS NOTES
Return Medical Weight Management Note     Margo Chris  MRN:  [id number]  :  1965  RAUL:  20    Dear Colleague,    I had the pleasure of seeing your patient Margo Chris.  She is a 50 year old female who I am continuing to see for treatment of obesity related to:Hyperlipidemia    CURRENT WEIGHT:   113 lbs 12.8 oz    Wt Readings from Last 4 Encounters:   11/15/21 51.6 kg (113 lb 12.8 oz)   20 54.4 kg (120 lb)   19 58.2 kg (128 lb 6.4 oz)   09/10/18 59.4 kg (130 lb 14.4 oz)     Body Mass Index:  Body mass index is 22.23 kg/m .  Vitals:  B/P: 134/82, P: 83    Initial consult weight was 163 on 12/3/10.  Weight change since last seen on 20 is down 7 pounds.   Total loss is 50 pounds.    INTERVAL HISTORY:  In 2 weeks without medication, she found increased snacking and effects on gut function. Since pandemic has been traveling less and thus eating better. Patient still working on the following dietary changes:  Goal 1200 sarah and feels does better on phentermine; she feels main effect of phentermine has been to increase her metabolism.   Patient has been working on the following activity changes: walking every day  No flowsheet data found.  MEDICATIONS:   Current Outpatient Medications   Medication     Multiple Vitamins-Minerals (MULTIVITAMIN ADULT) CHEW     phentermine (ADIPEX-P) 15 MG capsule     No current facility-administered medications for this visit.     Weight Loss Medication History Reviewed With Patient 2021   Which weight loss medications are you currently taking on a regular basis?  Phentermine   Are you having any side effects from the weight loss medication that we have prescribed you? No     ASSESSMENT:   She feels in good control on phentermine after doubling of dose and has long time stable weight now, no AEs, wants to continue.    FOLLOW-UP:    6 months.  Video call duration: 15 minutes.  I explained the conditions and plans (more than 50% of time  "was counseling/coordination of weight management).    Sincerely,  Alvaro Blackmon MD     The patient was evaluated via a billable video visit and notified \"This visit will be via video call between you and your physician. If lab work is needed we can place an order and you can later stop by the lab. Video visits are billed at different rates depending on your insurance coverage.  Please reach out to your insurance provider with any questions. If the physician feels a video visit is not appropriate, you will not be charged for this service.\" Patient gave verbal consent for Video visit and would you like to obtain AVS by [employer].  "

## 2021-11-15 NOTE — NURSING NOTE
(   Chief Complaint   Patient presents with     RECHECK     Return MWM    )    ( Weight: 51.6 kg (113 lb 12.8 oz) (Patient reported) )  ( Height: 152.4 cm (5') )  ( BMI (Calculated): 22.22 )  (   )  (   )  (   )  (   )  (   )  (   )    (   )  (   )  (   )  (   )  (   )  (   )  (   )    (   Patient Active Problem List   Diagnosis     Chronic fatigue syndrome     Hyperlipidemia     Obesity     S/P vaginal hysterectomy     Abnormal weight gain     Family history of diabetes mellitus     Gilbert syndrome     Subclinical hypothyroidism     Urticaria    )  (   Current Outpatient Medications   Medication Sig Dispense Refill     Multiple Vitamins-Minerals (MULTIVITAMIN ADULT) CHEW Take 2 chew tab by mouth daily       phentermine (ADIPEX-P) 15 MG capsule Take 2 capsules (30 mg) by mouth every morning 60 capsule 5    )  ( Diabetes Eval:    )    ( Pain Eval:  No Pain (0) )    ( Wound Eval:       )    (   History   Smoking Status     Never Smoker   Smokeless Tobacco     Never Used    )    ( Signed By:  Mary Jane Whelan, EMT; November 15, 2021; 8:24 AM )

## 2021-11-15 NOTE — PROGRESS NOTES
Margo is a 56 year old who is being evaluated via a billable video visit.      How would you like to obtain your AVS? MyChart  If the video visit is dropped, the invitation should be resent by: [phone]  Will anyone else be joining your video visit? No    During this virtual visit the patient is located in [place], patient verifies this as the location during the entirety of this visit.     Video-Visit Details    Type of service:  Video Visit    Video call duration: 15 minutes.  I explained the conditions and plans (more than 50% of time was counseling/coordination of weight management).    Originating Location (pt. Location): Home    Distant Location (provider location):  [facility] WEIGHT MANAGEMENT CLIN[facility] [place]     Platform used for Video Visit: [email or web address]

## 2021-11-15 NOTE — LETTER
11/15/2021       RE: Margo Chris  [address] [place] [address]     Dear Colleague,    Thank you for referring your patient, Margo Chris, to the [facility]NAG[facility]IN[facility] [place] [facility]. Please see a copy of my visit note below.    Margo is a 56 year old who is being evaluated via a billable video visit.      How would you like to obtain your AVS? MyChart  If the video visit is dropped, the invitation should be resent by: [phone]  Will anyone else be joining your video visit? No    During this virtual visit the patient is located in [place], patient verifies this as the location during the entirety of this visit.     Video-Visit Details    Type of service:  Video Visit    Video call duration: 15 minutes.  I explained the conditions and plans (more than 50% of time was counseling/coordination of weight management).    Originating Location (pt. Location): Home    Distant Location (provider location):  [facility] [place]     Platform used for Video Visit: [employer]          [facility] Medical Weight Management Note     Margo Chris  MRN:  [id number]  :  1965  RAUL:  20    Dear Colleague,    I had the pleasure of seeing your patient Margo Chris.  She is a 50 year old female who I am continuing to see for treatment of obesity related to:Hyperlipidemia    CURRENT WEIGHT:   113 lbs 12.8 oz    Wt Readings from Last 4 Encounters:   11/15/21 51.6 kg (113 lb 12.8 oz)   20 54.4 kg (120 lb)   19 58.2 kg (128 lb 6.4 oz)   09/10/18 59.4 kg (130 lb 14.4 oz)     Body Mass Index:  Body mass index is 22.23 kg/m .  Vitals:  B/P: 134/82, P: 83    Initial consult weight was 163 on 12/3/10.  Weight change since last seen on 20 is down 7 pounds.   Total loss is 50 pounds.    INTERVAL HISTORY:  In 2 weeks without medication, she found increased snacking and  "effects on gut function. Since pandemic has been traveling less and thus eating better. Patient still working on the following dietary changes:  Goal 1200 sarah and feels does better on phentermine; she feels main effect of phentermine has been to increase her metabolism.   Patient has been working on the following activity changes: walking every day  No flowsheet data found.  MEDICATIONS:   Current Outpatient Medications   Medication     Multiple Vitamins-Minerals (MULTIVITAMIN ADULT) CHEW     phentermine (ADIPEX-P) 15 MG capsule     No current facility-administered medications for this visit.     Weight Loss Medication History Reviewed With Patient 11/14/2021   Which weight loss medications are you currently taking on a regular basis?  Phentermine   Are you having any side effects from the weight loss medication that we have prescribed you? No     ASSESSMENT:   She feels in good control on phentermine after doubling of dose and has long time stable weight now, no AEs, wants to continue.    FOLLOW-UP:    6 months.  Video call duration: 15 minutes.  I explained the conditions and plans (more than 50% of time was counseling/coordination of weight management).    Sincerely,  Alvaro Blackmon MD     The patient was evaluated via a billable video visit and notified \"This visit will be via video call between you and your physician. If lab work is needed we can place an order and you can later stop by the lab. Video visits are billed at different rates depending on your insurance coverage.  Please reach out to your insurance provider with any questions. If the physician feels a video visit is not appropriate, you will not be charged for this service.\" Patient gave verbal consent for Video visit and would you like to obtain AVS by [employer].    "

## 2021-11-16 ENCOUNTER — TELEPHONE (OUTPATIENT)
Dept: ENDOCRINOLOGY | Facility: CLINIC | Age: 56
End: 2021-11-16

## 2021-11-16 ENCOUNTER — TELEPHONE (OUTPATIENT)
Dept: ENDOCRINOLOGY | Facility: CLINIC | Age: 56
End: 2021-11-16
Payer: COMMERCIAL

## 2021-11-16 NOTE — TELEPHONE ENCOUNTER
Reason for Call:  Medication or medication refill: Medication     Do you use a [facility]?  Name of the pharmacy and phone number for the current request:  Patient Wants it to go to pharmacy which is in her profile. [id number] [employer] address  [address], [address]. Phone# [phone]     Name of the medication requested: phentermine (ADIPEX-P) 15 MG capsule    Other request: Patient is out of her medication for 15 days and she has called the phone# 4 times and 2 myc messages with no reply via myc or phone call back. Patient had her appointment yesterday with Lorri and they verified the correct pharmacy that is on file. But they sent the prescription to a different pharmacy.  Can we leave a detailed message on this number? YES    Phone number patient can be reached at: Cell number on file:    Telephone Information:   Mobile [phone]       Best Time: Anytime     Call taken on 11/16/2021 at 4:48 PM by iNa Avila

## 2021-11-17 ENCOUNTER — MYC MEDICAL ADVICE (OUTPATIENT)
Dept: ENDOCRINOLOGY | Facility: CLINIC | Age: 56
End: 2021-11-17
Payer: COMMERCIAL

## 2021-11-17 DIAGNOSIS — E66.01 MORBID OBESITY (H): ICD-10-CM

## 2021-11-18 RX ORDER — PHENTERMINE HYDROCHLORIDE 15 MG/1
30 CAPSULE ORAL EVERY MORNING
Qty: 60 CAPSULE | Refills: 5 | Status: SHIPPED | OUTPATIENT
Start: 2021-11-18 | End: 2022-06-02

## 2022-02-13 ENCOUNTER — HEALTH MAINTENANCE LETTER (OUTPATIENT)
Age: 57
End: 2022-02-13

## 2022-06-01 DIAGNOSIS — E66.01 MORBID OBESITY (H): ICD-10-CM

## 2022-06-02 RX ORDER — PHENTERMINE HYDROCHLORIDE 15 MG/1
30 CAPSULE ORAL EVERY MORNING
Qty: 60 CAPSULE | Refills: 5 | Status: SHIPPED | OUTPATIENT
Start: 2022-06-02 | End: 2023-01-02

## 2022-06-02 RX ORDER — PHENTERMINE HYDROCHLORIDE 15 MG/1
30 CAPSULE ORAL EVERY MORNING
Qty: 60 CAPSULE | OUTPATIENT
Start: 2022-06-02

## 2022-06-02 NOTE — TELEPHONE ENCOUNTER
Refill denied at this time. Patient needs appointment with Dr. Blackmon. Rolly message sent to patient to schedule appointment.

## 2022-06-02 NOTE — TELEPHONE ENCOUNTER
Patient received conflicting recommendations for follow up (6 months vs 1 year). Advised her to schedule an appointment. BP is WNL, weight is maintained. Routing to available provider for sign off.

## 2022-06-02 NOTE — TELEPHONE ENCOUNTER
Pt needs appt, pt of Lorri OLEAV:11-15-21   ( RTC 1 Y )  FYI to clinic  FYI to scheduling     Recieved refill request for phentermine . Patient needs appointment scheduled prior to any refills. Clinic Coordinator notified and will follow up with the patient as appropriate. The pharmacy has been notified that the medication will not be refilled prior to an appointment being scheduled.

## 2022-10-15 ENCOUNTER — HEALTH MAINTENANCE LETTER (OUTPATIENT)
Age: 57
End: 2022-10-15

## 2023-01-02 ENCOUNTER — VIRTUAL VISIT (OUTPATIENT)
Dept: ENDOCRINOLOGY | Facility: CLINIC | Age: 58
End: 2023-01-02
Payer: COMMERCIAL

## 2023-01-02 VITALS — HEIGHT: 60 IN | BODY MASS INDEX: 23.87 KG/M2 | WEIGHT: 121.6 LBS

## 2023-01-02 DIAGNOSIS — E66.01 MORBID OBESITY (H): ICD-10-CM

## 2023-01-02 PROCEDURE — 99202 OFFICE O/P NEW SF 15 MIN: CPT | Mod: GT

## 2023-01-02 RX ORDER — PHENTERMINE HYDROCHLORIDE 15 MG/1
30 CAPSULE ORAL EVERY MORNING
Qty: 60 CAPSULE | Refills: 5 | Status: SHIPPED | OUTPATIENT
Start: 2023-01-02 | End: 2024-02-20

## 2023-01-02 ASSESSMENT — PAIN SCALES - GENERAL: PAINLEVEL: NO PAIN (0)

## 2023-01-02 NOTE — NURSING NOTE
(   Chief Complaint   Patient presents with     RECHECK     Return MWM    )    ( Weight: 55.2 kg (121 lb 9.6 oz) (Patient reported) )  ( Height: 152.4 cm (5') )  ( BMI (Calculated): 23.75 )  (   )  (   )  (   )  (   )  (   )  (   )    (   )  (   )  (   )  (   )  (   )  (   )  (   )    (   Patient Active Problem List   Diagnosis     Chronic fatigue syndrome     Hyperlipidemia     Obesity     S/P vaginal hysterectomy     Abnormal weight gain     Family history of diabetes mellitus     Gilbert syndrome     Subclinical hypothyroidism     Urticaria    )  (   Current Outpatient Medications   Medication Sig Dispense Refill     Multiple Vitamins-Minerals (MULTIVITAMIN ADULT) CHEW Take 2 chew tab by mouth daily       phentermine (ADIPEX-P) 15 MG capsule Take 2 capsules (30 mg) by mouth every morning 60 capsule 5    )  ( Diabetes Eval:    )    ( Pain Eval:  No Pain (0) )    ( Wound Eval:       )    (   History   Smoking Status     Never   Smokeless Tobacco     Never    )    ( Signed By:  Mary Jane Whelan, EMT; January 2, 2023; 9:14 AM )

## 2023-01-02 NOTE — LETTER
2023       RE: Margo Chris  [address] [place] [address]     Dear Colleague,    Thank you for referring your patient, Margo Chris, to the [facility] WE[facility]NAGEMENT CLINIC [place] at [facility]. Please see a copy of my visit note below.    Margo Chris is a 57 year old who is being evaluated via a billable video visit.      How would you like to obtain your AVS? MyChart  If the video visit is dropped, the invitation should be resent by: Text to cell phone: [phone]  Will anyone else be joining your video visit? No    During this virtual visit the patient is located in [place], patient verifies this as the location during the entirety of this visit.      Video-Visit Details    Video Start Time: 9:31    Type of service:  Video Visit    Video End Time:9:46AM    Originating Location (pt. Location): Home        Distant Location (provider location):  On-site    Platform used for Video Visit: [facility] Medical Weight Management Note     Margo Chris  MRN:  [id number]  :  1965  RAUL:  2023    Dear Hiwot Tim MD,    I had the pleasure of seeing your patient Margo Chris. She is a 57 year old female who I am continuing to see for treatment of obesity related to:    No flowsheet data found.    Assessment & Plan   Problem List Items Addressed This Visit    None  Visit Diagnoses     Morbid obesity (H)        Relevant Medications    phentermine (ADIPEX-P) 15 MG capsule         1. Continue Phentermine 15mg two tablets every morning. Refills sent.   2. Follow up with Dr. Blackmon in 6 months     INTERVAL HISTORY:  Dr. Blackmon - 2010  Starting weight - 163lb  Phentermine     Will have increase of 5lbs each winter. Will lose the 5lbs every spring when days get longer and able to walk more.     Anti-obesity medications:     Current:   Phentermine 15mg 2 tablets daily - continues to  help with hunger. Will have increased hunger if stop taking. Denies any new side effects. Has had the 15mg tablet historically for flexibility if wants to decrease dose.    Recent diet changes: Diet has been stable. Very careful with food.     Recent exercise/activity changes: Decrease walking through winter.     Recent stressors:  Increased stress due to father being in hospice.     Recent sleep changes: Stable      CURRENT WEIGHT:   121 lbs 9.6 oz    Initial Weight (lbs): 113.8 lbs  Last Visits Weight: 51.6 kg (113 lb 12.8 oz)  Cumulative weight loss (lbs): -7.8  Weight Loss Percentage: -6.85%    Wt Readings from Last 5 Encounters:   01/02/23 55.2 kg (121 lb 9.6 oz)   11/15/21 51.6 kg (113 lb 12.8 oz)   09/14/20 54.4 kg (120 lb)   09/16/19 58.2 kg (128 lb 6.4 oz)   09/10/18 59.4 kg (130 lb 14.4 oz)         Changes and Difficulties 1/2/2023   I have made the following changes to my diet since my last visit: No changes   With regards to my diet, I am still struggling with: Nothing   I have made the following changes to my activity/exercise since my last visit: No changes, walking every day   With regards to my activity/exercise, I am still struggling with: Winter         MEDICATIONS:   Current Outpatient Medications   Medication Sig Dispense Refill     Multiple Vitamins-Minerals (MULTIVITAMIN ADULT) CHEW Take 2 chew tab by mouth daily       phentermine (ADIPEX-P) 15 MG capsule Take 2 capsules (30 mg) by mouth every morning 60 capsule 5       Weight Loss Medication History Reviewed With Patient 1/2/2023   Which weight loss medications are you currently taking on a regular basis?  Phentermine   Are you having any side effects from the weight loss medication that we have prescribed you? No           PHYSICAL EXAM:  Objective     Ht 1.524 m (5')   Wt 55.2 kg (121 lb 9.6 oz)   LMP 01/26/2018 (Approximate)   BMI 23.75 kg/m      Vitals - Patient Reported  Pain Score: No Pain (0)        GENERAL: Healthy, alert and no  distress  EYES: Eyes grossly normal to inspection.  No discharge or erythema, or obvious scleral/conjunctival abnormalities.  RESP: No audible wheeze, cough, or visible cyanosis.  No visible retractions or increased work of breathing.    SKIN: Visible skin clear. No significant rash, abnormal pigmentation or lesions.  NEURO: Cranial nerves grossly intact.  Mentation and speech appropriate for age.  PSYCH: Mentation appears normal, affect normal/bright, judgement and insight intact, normal speech and appearance well-groomed.        Sincerely,    JAVED CALDERON PA-C      29 minutes spent on the date of the encounter doing chart review, history and exam, documentation and further activities per the note

## 2023-01-02 NOTE — PROGRESS NOTES
Margo Chris is a 57 year old who is being evaluated via a billable video visit.      How would you like to obtain your AVS? MyChart  If the video visit is dropped, the invitation should be resent by: Text to cell phone: [phone]  Will anyone else be joining your video visit? No    During this virtual visit the patient is located in [place], patient verifies this as the location during the entirety of this visit.      Video-Visit Details    Video Start Time: 9:31    Type of service:  Video Visit    Video End Time:9:46AM    Originating Location (pt. Location): Home        Distant Location (provider location):  On-site    Platform used for Video Visit: [facility] Medical Weight Management Note     Margo Chris  MRN:  [id number]  :  1965  RAUL:  2023    Dear Hiwot Tim MD,    I had the pleasure of seeing your patient Margo Chris. She is a 57 year old female who I am continuing to see for treatment of obesity related to:    No flowsheet data found.    Assessment & Plan   Problem List Items Addressed This Visit    None  Visit Diagnoses     Morbid obesity (H)        Relevant Medications    phentermine (ADIPEX-P) 15 MG capsule         1. Continue Phentermine 15mg two tablets every morning. Refills sent.   2. Follow up with Dr. Blackmon in 6 months     INTERVAL HISTORY:  Dr. Blackmon - 2010  Starting weight - 163lb  Phentermine     Will have increase of 5lbs each winter. Will lose the 5lbs every spring when days get longer and able to walk more.     Anti-obesity medications:     Current:   Phentermine 15mg 2 tablets daily - continues to help with hunger. Will have increased hunger if stop taking. Denies any new side effects. Has had the 15mg tablet historically for flexibility if wants to decrease dose.    Recent diet changes: Diet has been stable. Very careful with food.     Recent exercise/activity changes: Decrease walking through winter.     Recent stressors:   Increased stress due to father being in hospice.     Recent sleep changes: Stable      CURRENT WEIGHT:   121 lbs 9.6 oz    Initial Weight (lbs): 113.8 lbs  Last Visits Weight: 51.6 kg (113 lb 12.8 oz)  Cumulative weight loss (lbs): -7.8  Weight Loss Percentage: -6.85%    Wt Readings from Last 5 Encounters:   01/02/23 55.2 kg (121 lb 9.6 oz)   11/15/21 51.6 kg (113 lb 12.8 oz)   09/14/20 54.4 kg (120 lb)   09/16/19 58.2 kg (128 lb 6.4 oz)   09/10/18 59.4 kg (130 lb 14.4 oz)         Changes and Difficulties 1/2/2023   I have made the following changes to my diet since my last visit: No changes   With regards to my diet, I am still struggling with: Nothing   I have made the following changes to my activity/exercise since my last visit: No changes, walking every day   With regards to my activity/exercise, I am still struggling with: Winter         MEDICATIONS:   Current Outpatient Medications   Medication Sig Dispense Refill     Multiple Vitamins-Minerals (MULTIVITAMIN ADULT) CHEW Take 2 chew tab by mouth daily       phentermine (ADIPEX-P) 15 MG capsule Take 2 capsules (30 mg) by mouth every morning 60 capsule 5       Weight Loss Medication History Reviewed With Patient 1/2/2023   Which weight loss medications are you currently taking on a regular basis?  Phentermine   Are you having any side effects from the weight loss medication that we have prescribed you? No           PHYSICAL EXAM:  Objective    Ht 1.524 m (5')   Wt 55.2 kg (121 lb 9.6 oz)   LMP 01/26/2018 (Approximate)   BMI 23.75 kg/m      Vitals - Patient Reported  Pain Score: No Pain (0)        GENERAL: Healthy, alert and no distress  EYES: Eyes grossly normal to inspection.  No discharge or erythema, or obvious scleral/conjunctival abnormalities.  RESP: No audible wheeze, cough, or visible cyanosis.  No visible retractions or increased work of breathing.    SKIN: Visible skin clear. No significant rash, abnormal pigmentation or lesions.  NEURO: Cranial  nerves grossly intact.  Mentation and speech appropriate for age.  PSYCH: Mentation appears normal, affect normal/bright, judgement and insight intact, normal speech and appearance well-groomed.        Sincerely,    JAVED CALDERON PA-C      29 minutes spent on the date of the encounter doing chart review, history and exam, documentation and further activities per the note

## 2023-01-03 NOTE — PATIENT INSTRUCTIONS
"Thank you for allowing us the privilege of caring for you. We hope we provided you with the excellent service you deserve.   Please let us know if there is anything else we can do for you so that we can be sure you are completely satisfied with your care experience.    To ensure the quality of our services you may be receiving a patient satisfaction survey from an independent patient satisfaction monitoring company.    The greatest compliment you can give is a \"Likely to Recommend\"    Your visit was with JAVED CALDERON PA-C today.    Instructions per today's visit:     Mustapha Chris, it was great to visit with you today.  Here is a review of our visit.  If our clinic scheduler is not able to reach you please call [phone] to schedule your next appointments.    1. Continue Phentermine 15mg two tablets every morning. Refills sent.   2. Follow up with Dr. Blackmon in 6 months      Information about Video Visits with [facility]: video visit information  _________________________________________________________________________________________________________________________________________________________  If you are asked by your clinic team to have your blood pressure checked:  [facility] do offer several locations for blood pressure checks. Please follow the below link to schedule an appointment. Scheduling an appointment at the pharmacy for a blood pressure check is now preferred.    Appointment Plus (appointment-plus.LOGIC DEVICES)  _________________________________________________________________________________________________________________________________________________________  Important contact and scheduling information:  Please call our contact center at [phone] to schedule your next appointments.  To find a lab location near you, please call [phone].  For any nursing questions or concerns call Patti Broderick LPN at [phone] or Tanvi Rosario RN at [phone]  Please call " during clinic hours Monday through Friday 8:00a - 4:00p if you have questions or you can contact us via [employer] at anytime and we will reply during clinic hours.    Lab results will be communicated through My Chart or letter (if My Chart not used). Please call the clinic if you have not received communication after 1 week or if you have any questions.?  Clinic Fax: [phone]    _________________________________________________________________________________________________________________________________________________________  Meal Replacement Products:    Here is the link to our new e-store where you can purchase our meal replacement products    [employer] E-Store  e[employer]/store    The one week starter kit is a great way to sample a variety of products and see what works for you.    If you want more information about the product go to: Fresh [email or web address]    If you are an employee or [facility] or [facility] please contact your care team for a 10% estore discount    Free Shipping for orders over $75     Benefits of meal replacements products:    Portion and calorie control  Improved nutrition  Structured eating  Simplified food choices  Avoid contact with trigger foods  _________________________________________________________________________________________________________________________________________________________  Interested in working with a health ?  Health coaches work with you to improve your overall health and wellbeing.  They look at the whole person, and may involve discussion of different areas of life, including, but not limited to the four pillars of health (sleep, exercise, nutrition, and stress management). Discuss with your care team if you would like to start working a health .  Health Coaching-3 Pack: Schedule by calling [phone]    $99 for three health coaching visits    Visits may be done in person or via  phone    Coaching is a partnership between the  and the client; Coaches do not prescribe or diagnose    Coaching helps inspire the client to reach his/her personal goals   _________________________________________________________________________________________________________________________________________________________  24 Week Healthy Lifestyle Plan:    Our mission in the 24-week Healthy Lifestyle Plan is to provide you with individualized care by giving you the tools, education and support you need to lose weight and maintain a healthy lifestyle. In your 24-week journey, you ll be supported by a dedicated weight loss team that includes registered dietitians, medical weight management providers, health coaches, and nurses -- all with special expertise in weight loss -- to help you every step of the way.     Monthly meetings with your registered dietician or medical weight management provider help to review your progress, update your care plan, and make any adjustments needed to ensure success. Between these visits, weekly and bi-weekly health  visits will help you focus on the four pillars of weight loss -- stress, sleep, nutrition, and exercise -- and how you can best adapt each to achieve sustainable weight loss results.    In addition, you will be given exclusive access to online wellbeing classes through [employer].  Your initial visit will be with a medical weight management provider who will help to understand your weight loss goals and ensure this program is the right fit for you. Please let our team know if you are interested in the 24 week plan by sending a message to your care team or calling [phone] to schedule.  _________________________________________________________________________________________________________________________________________________________    COMPREHENSIVE WEIGHT MANAGEMENT PROGRAM  VIRTUAL SUPPORT GROUPS    For Support Group Information:      We offer support  "groups for patients who are working on weight loss and considering, preparing for or have had weight loss surgery.   There is no cost for this opportunity.  You are invited to attend the?Virtual Support Groups?provided by any of the following locations:    [facility] via Microsoft Teams with Elke Dickey RN  2.   [place] via Bouncefootball with Keagan Deluna, PhD, LP  3.   [place] via Bouncefootball with Josette Nicholson RN  4.   [facility] via Stuffle Teams with Josette Silver [facility]    The following Support Group information can also be found on our website:  [email or web address]    [facility] Weight Loss Surgery Support Group    [facility] Weight Loss Surgery Support Group  The support group is a patient-lead forum that meets monthly to share experiences, encouragement and education. It is open to those who have had weight loss surgery, are scheduled for surgery, and those who are considering surgery.   WHEN: This group meets on the 3rd Wednesday of each month from 5:00PM - 6:00PM virtually using Microsoft Teams.   FACILITATOR: Led by Elke Dickey RD, LD, RN, the program's Clinical Coordinator.   TO REGISTER: Please contact the clinic via ScanScout or call the nurse line directly at [phone] to inform our staff that you would like an invite sent to you and to let us know the email you would like the invite sent to. Prior to the meeting, a link with directions on how to join the meeting will be sent to you.    2022 Meetings  Beba 15: \"Let's Talk\" a time for the group to share.  July 20: \"Let's Talk\" a time for the group to share.  August 17: \"Let's Talk\" a time for the group to share.  September 21: \"Let's Talk\" a time for the group to share.  October 19: Guest Speaker: Dr Jose Stevens MD Pulmonologist and Sleep Medicine Physician, \"Getting a Good Night's Sleep\".  November 16: \"Let's Talk\" a time for the group to " "share.  December 21: \"Let's Talk\" a time for the group to share.    [facility] and [facility] Support Groups    Connections: Bariatric Care Support Group?  This is open to all [facility] (and those external to this program) pre- and post- operative bariatric surgery patients as well as their support system.   WHEN: This group meets the 2nd Tuesday of each month from 6:30 PM - 8:00 PM virtually using Microsoft Teams.   FACILITATOR: Led by Keagan Deluna, Ph.D who is a Licensed Psychologist with the [facility] Comprehensive Weight Management Program.   TO REGISTER: Please send an email to Keagan Deluna, Ph.D., LP at?[email or web address]?if you would like an invitation to the group and to learn about using Microsoft Teams.    2022 Meetings  June 14: Queta Escamilla RD, LD at [facility], \"Nutritional Labeling\"  July 12 August 2 (Please Note Date Change)  September 13 October 11 November 8 December 13    Connections: Post-Operative Bariatric Surgery Support Group  This is a support group for [facility] bariatric patients (and those external to [facility]) who have had bariatric surgery and are at least 3 months post-surgery.  WHEN: This support group meets the 4th Wednesday of the month from 11:00 AM - 12:00 PM virtually using Microsoft Teams.   FACILITATOR: Led by Certified Bariatric Nurse, Josette Nicholson RN.   TO REGISTER: Please send an email to Josette at [email or web address] if you would like an invitation to the group and to learn about using Microsoft Teams.    2022 Meetings June 22 July 27 August 24 September 28 October 26 November 23 December 28      [facility] Healthy Lifestyle Virtual Support Group    Healthy Lifestyle Virtual Support Group?  This is 60 minutes of small group guided discussion, support and resources. All are welcome who want a healthy lifestyle.  WHEN: This group meets " "monthly on a Friday from 12:30 PM - 1:30 PM virtually using Microsoft Teams.   FACILITATOR: Led by National Board Certified Health and , Josette Silver [facility].   TO REGISTER: Please send an email to Josette [email or web address] to receive monthly invites to the group or if you have any questions about having a health .  Prior to the meeting, a link with directions on how to join the meeting will be sent to you.    2022 Meetings  June 24: Josette Silver [facility], \"Setting Limits and Boundaries\".  Jul 29: Open Forum  August 26: Guest Speaker: Yesica Mclaughlin Registered Dietitian  September 30: Open Forum  October 28th: Guest Speaker: Hiwot Serra [facility], Health , \"Gratitude Practices\".  November 18: Guest Speaker: Toshia Peace RD Registered Dietitian, \"Navigating How to Eat around the Holidays\".  December 16: Guest Speaker: Laverne Augustine [facility], \"Changing Your Relationship with Movement\".    ____________________________________________________________________________________________________________________________________________________________________________  [facility] Get Moving Program  Our team of physical therapists is trained to help you understand and take control of your condition. They will perform a thorough evaluation to determine your ability for activity and develop a customized plan to fit your goals and physical ability.  Scheduling: Unsure if the Get Moving program is right for you? Discuss the program with your medical provider or diabetes educator. You can also call us at [phone] to ask questions or schedule an appointment.   [facility] Get Moving Program  ____________________________________________________________________________________________________________________________________________________________________________  [facility] Diabetes Prevention Program (DPP)  If you have prediabetes and Medicare please contact us via MyChart to " learn more about the Diabetes Prevention Program (DPP)  Program Details:  [facility] offers the year-long Diabetes Prevention Program (DPP). The program helps you to make lifestyle changes that prevent or delay type 2 diabetes by supporting healthy eating, increased physical activity, stress reduction and use of coping skills.   On average, previous [facility] DPP cohorts have lost and maintained at least 5% of their starting weight throughout the program and averaged more than 150 minutes of physical activity per week.  Participants meet weekly for one-hour group sessions over sixteen weeks, every other week for the next 8 weeks, and monthly for the last six months.   A year-long maintenance program is also available for participants who complete the first year.   Location & Cost:   During the COVID-19 Public Health Emergency, the program is offered virtually. When in-person classes can resume, they will be held at [facility].  For people with Medicare, the program is covered in full. A self-pay option will also be available for those with non-Medicare insurance plans.   _________________________________________________________________________________________________________________________________________________________  Bluetooth Scale:    We hope to provide you with high quality virtual healthcare visits while social distancing for COVID-19 is necessary, as well as in the future when virtual visits may be more convenient for you.     Our technology team made it possible for Bluetooth scales to send weight measurements to our electronic medical record. This allows weights from you weighing at home to securely flow into the medical record, which will improve telephone and virtual visits.   Additionally, studies have shown that adults actually lose more weight when their weights are automatically sent to someone else, and also that this process is not stressful for  those adults.    Below is a link for purchasing the scale, with a discount code for our patients. You may call your insurance company to see if they will reimburse you for the cost of the scale, as a piece of durable medical equipment. The scales only go up to a weight of 400 pounds. This is an issue and we are working with the developer on increasing this. We found no scales that go over 400lb that have blue-tooth for connecting to [employer].    Scale to purchase: the [employer]  Body  Scale: https://www.[employer]/us/en/body/shop?gclid=[email or web address]=aw.ds    Discount Code: We have a discount code for our patients to bring the cost down to $50, Discount code is: [place]_Scale_20%off  _______________________________________________________________________________________________________________________________________________________________________________    To work with a Behavioral Health Psychologist:    Call to schedule:    Romeo Mendes - [phone]  Sylvia Robins - [phone]  Hafsa Cook - [phone]  Andreia Singh - [phone]   Park Glynn PhD (cannot accept Medicare) [phone]        Thank you,   [facility] Comprehensive Weight Management Team

## 2023-01-04 ENCOUNTER — TELEPHONE (OUTPATIENT)
Dept: ENDOCRINOLOGY | Facility: CLINIC | Age: 58
End: 2023-01-04

## 2023-03-26 ENCOUNTER — HEALTH MAINTENANCE LETTER (OUTPATIENT)
Age: 58
End: 2023-03-26

## 2023-07-07 ENCOUNTER — VIRTUAL VISIT (OUTPATIENT)
Dept: ENDOCRINOLOGY | Facility: CLINIC | Age: 58
End: 2023-07-07
Payer: COMMERCIAL

## 2023-07-07 VITALS — HEIGHT: 61 IN | BODY MASS INDEX: 22.66 KG/M2 | WEIGHT: 120 LBS

## 2023-07-07 DIAGNOSIS — E66.811 CLASS 1 OBESITY DUE TO EXCESS CALORIES WITHOUT SERIOUS COMORBIDITY IN ADULT, UNSPECIFIED BMI: Primary | ICD-10-CM

## 2023-07-07 DIAGNOSIS — E66.09 CLASS 1 OBESITY DUE TO EXCESS CALORIES WITHOUT SERIOUS COMORBIDITY IN ADULT, UNSPECIFIED BMI: Primary | ICD-10-CM

## 2023-07-07 PROCEDURE — 99214 OFFICE O/P EST MOD 30 MIN: CPT | Mod: VID

## 2023-07-07 RX ORDER — PHENTERMINE HYDROCHLORIDE 30 MG/1
30 CAPSULE ORAL EVERY MORNING
Qty: 30 CAPSULE | Refills: 5 | Status: SHIPPED | OUTPATIENT
Start: 2023-07-07 | End: 2024-04-04

## 2023-07-07 ASSESSMENT — PAIN SCALES - GENERAL: PAINLEVEL: NO PAIN (0)

## 2023-07-07 NOTE — PROGRESS NOTES
Virtual Visit Details    Type of service:  Video Visit     Originating Location (pt. Location): Home    Distant Location (provider location):  Off-site  Platform used for Video Visit: [facility] Weight Management Note     Margo Chris  MRN:  [id number]  :  1965  RAUL:  2023    Dear Hiwot Tim MD,    I had the pleasure of seeing your patient Margo Chris. She is a 58 year old female who I am continuing to see for treatment of obesity related to:         No data to display                Assessment & Plan   Problem List Items Addressed This Visit        Digestive    Class 1 obesity without serious comorbidity with body mass index (BMI) of 30.0 to 30.9 in adult - Primary    Relevant Medications    phentermine (ADIPEX-P) 30 MG capsule      1. Continue Phentermine 30mg - take 1 tablet once daily. Refills sent   2. BMP ordered   3. Follow up with Nia Us or Dr. Blackmon in 6 months     INTERVAL HISTORY:  Dr. Blackmon - 2010  Starting weight - 163lb  Last seen by Dr. Blackmon 11/15/2021 - continue Phentermine 15-30mg once daily   Last seen 2023  - continue Phentermine 15-30mg once daily - historically has interchanged dose as needed         Typically will lose weight from winter to summer. Gained 10lbs this winter, and has only been able to lose 5lbs this summer. Was a tougher winter. Wondering if this is just her new baseline       Anti-obesity medications:     Current:   Phentermine 15mg - typically taking 2 tablets. Tried taking 1 tablet, but feels like it dose not regulate as well. No side effects.       Recent diet changes: No changes. Eating 3 meals a day. Eats more salads in the summer. Calories around 4484-3173 daily.     Recent exercise/activity changes: Walking 7xweek. Got a ebike and started to bike more.     Recent stressors:  Stable    Recent sleep changes: Stable    Vitamins/Labs: Bmp needed     CURRENT WEIGHT:   120 lbs 0 oz    Initial Weight  "(lbs): 163 lbs  Last Visits Weight: 55.2 kg (121 lb 9.6 oz)  Cumulative weight loss (lbs): 43  Weight Loss Percentage: 26.38%     Wt Readings from Last 5 Encounters:   07/07/23 54.4 kg (120 lb)   01/02/23 55.2 kg (121 lb 9.6 oz)   11/15/21 51.6 kg (113 lb 12.8 oz)   09/14/20 54.4 kg (120 lb)   09/16/19 58.2 kg (128 lb 6.4 oz)             7/5/2023     6:13 PM   Changes and Difficulties   I have made the following changes to my diet since my last visit: none   With regards to my diet, I am still struggling with: n/a   I have made the following changes to my activity/exercise since my last visit: Added cycling in addition to walking   With regards to my activity/exercise, I am still struggling with: n/a         MEDICATIONS:   Current Outpatient Medications   Medication Sig Dispense Refill     Multiple Vitamins-Minerals (MULTIVITAMIN ADULT) CHEW Take 2 chew tab by mouth daily       phentermine (ADIPEX-P) 15 MG capsule Take 2 capsules (30 mg) by mouth every morning 60 capsule 5     phentermine (ADIPEX-P) 30 MG capsule Take 1 capsule (30 mg) by mouth every morning 30 capsule 5           7/5/2023     6:13 PM   Weight Loss Medication History Reviewed With Patient   Which weight loss medications are you currently taking on a regular basis? Phentermine   Are you having any side effects from the weight loss medication that we have prescribed you? No             Objective    Ht 1.537 m (5' 0.5\")   Wt 54.4 kg (120 lb)   LMP 01/26/2018 (Approximate)   BMI 23.05 kg/m      Vitals - Patient Reported  Pain Score: No Pain (0)      Vitals:  No vitals were obtained today due to virtual visit.    PHYSICAL EXAM:  GENERAL: Healthy, alert and no distress  EYES: Eyes grossly normal to inspection.  No discharge or erythema, or obvious scleral/conjunctival abnormalities.  RESP: No audible wheeze, cough, or visible cyanosis.  No visible retractions or increased work of breathing.    SKIN: Visible skin clear. No significant rash, abnormal " pigmentation or lesions.  NEURO: Cranial nerves grossly intact.  Mentation and speech appropriate for age.  PSYCH: Mentation appears normal, affect normal/bright, judgement and insight intact, normal speech and appearance well-groomed.        Sincerely,    JAVED CALDERON PA-C      40 minutes spent by me on the date of the encounter doing chart review, history and exam, documentation and further activities per the note

## 2023-07-07 NOTE — LETTER
2023       RE: Margo Chris  [address] [place] [address]     Dear Colleague,    Thank you for referring your patient, Margo Chris, to the [facility] MANAGEMENT [facility] [place] at [facility]. Please see a copy of my visit note below.    Virtual Visit Details    Type of service:  Video Visit     Originating Location (pt. Location): Home    Distant Location (provider location):  Off-site  Platform used for Video Visit: [facility] Medical Weight Management Note     Margo Chris  MRN:  [id number]  :  1965  RAUL:  2023    Dear Hiwot Tim MD,    I had the pleasure of seeing your patient Margo Chris. She is a 58 year old female who I am continuing to see for treatment of obesity related to:         No data to display                Assessment & Plan   Problem List Items Addressed This Visit          Digestive    Class 1 obesity without serious comorbidity with body mass index (BMI) of 30.0 to 30.9 in adult - Primary    Relevant Medications    phentermine (ADIPEX-P) 30 MG capsule      Continue Phentermine 30mg - take 1 tablet once daily. Refills sent   BMP ordered   Follow up with Nia Us or Dr. Blackmon in 6 months     INTERVAL HISTORY:  Dr. Blackmon - 2010  Starting weight - 163lb  Last seen by Dr. Blackmon 11/15/2021 - continue Phentermine 15-30mg once daily   Last seen 2023  - continue Phentermine 15-30mg once daily - historically has interchanged dose as needed         Typically will lose weight from winter to summer. Gained 10lbs this winter, and has only been able to lose 5lbs this summer. Was a tougher winter. Wondering if this is just her new baseline       Anti-obesity medications:     Current:   Phentermine 15mg - typically taking 2 tablets. Tried taking 1 tablet, but feels like it dose not regulate as well. No side effects.       Recent diet changes: No  "changes. Eating 3 meals a day. Eats more salads in the summer. Calories around 5410-1657 daily.     Recent exercise/activity changes: Walking 7xweek. Got a ebike and started to bike more.     Recent stressors:  Stable    Recent sleep changes: Stable    Vitamins/Labs: Bmp needed     CURRENT WEIGHT:   120 lbs 0 oz    Initial Weight (lbs): 163 lbs  Last Visits Weight: 55.2 kg (121 lb 9.6 oz)  Cumulative weight loss (lbs): 43  Weight Loss Percentage: 26.38%     Wt Readings from Last 5 Encounters:   07/07/23 54.4 kg (120 lb)   01/02/23 55.2 kg (121 lb 9.6 oz)   11/15/21 51.6 kg (113 lb 12.8 oz)   09/14/20 54.4 kg (120 lb)   09/16/19 58.2 kg (128 lb 6.4 oz)             7/5/2023     6:13 PM   Changes and Difficulties   I have made the following changes to my diet since my last visit: none   With regards to my diet, I am still struggling with: n/a   I have made the following changes to my activity/exercise since my last visit: Added cycling in addition to walking   With regards to my activity/exercise, I am still struggling with: n/a         MEDICATIONS:   Current Outpatient Medications   Medication Sig Dispense Refill    Multiple Vitamins-Minerals (MULTIVITAMIN ADULT) CHEW Take 2 chew tab by mouth daily      phentermine (ADIPEX-P) 15 MG capsule Take 2 capsules (30 mg) by mouth every morning 60 capsule 5    phentermine (ADIPEX-P) 30 MG capsule Take 1 capsule (30 mg) by mouth every morning 30 capsule 5           7/5/2023     6:13 PM   Weight Loss Medication History Reviewed With Patient   Which weight loss medications are you currently taking on a regular basis? Phentermine   Are you having any side effects from the weight loss medication that we have prescribed you? No             Objective    Ht 1.537 m (5' 0.5\")   Wt 54.4 kg (120 lb)   LMP 01/26/2018 (Approximate)   BMI 23.05 kg/m      Vitals - Patient Reported  Pain Score: No Pain (0)      Vitals:  No vitals were obtained today due to virtual visit.    PHYSICAL " EXAM:  GENERAL: Healthy, alert and no distress  EYES: Eyes grossly normal to inspection.  No discharge or erythema, or obvious scleral/conjunctival abnormalities.  RESP: No audible wheeze, cough, or visible cyanosis.  No visible retractions or increased work of breathing.    SKIN: Visible skin clear. No significant rash, abnormal pigmentation or lesions.  NEURO: Cranial nerves grossly intact.  Mentation and speech appropriate for age.  PSYCH: Mentation appears normal, affect normal/bright, judgement and insight intact, normal speech and appearance well-groomed.        Sincerely,    JAVED CALDERON PA-C      40 minutes spent by me on the date of the encounter doing chart review, history and exam, documentation and further activities per the note

## 2023-07-07 NOTE — NURSING NOTE
Is the patient currently in the state of [place]? YES    Visit mode:VIDEO    If the visit is dropped, the patient can be reconnected by: VIDEO VISIT: Text to cell phone: [phone]    Will anyone else be joining the visit? NO      How would you like to obtain your AVS? MyChart    Are changes needed to the allergy or medication list? NO    Reason for visit: RECHECK (LILLY)        Geri Jimenes, VF

## 2023-07-07 NOTE — PATIENT INSTRUCTIONS
"Mustapha Aragon,   Thank you for allowing us the privilege of caring for you. We hope we provided you with the excellent service you deserve.   Please let us know if there is anything else we can do for you so that we can be sure you are completely satisfied with your care experience.    To ensure the quality of our services you may be receiving a patient satisfaction survey from an independent patient satisfaction monitoring company.    The greatest compliment you can give is a \"Likely to Recommend\"    Your visit was with JAVED CALDERON PA-C today.    Instructions per today's visit:     Continue Phentermine 30mg - take 1 tablet once daily. Refills sent   BMP ordered   Follow up with Javed Us or Dr. Blackmon in 6 months     ___________________________________________________________________________  Important contact and scheduling information:  Please call our contact center at [phone] to schedule your next appointments.  For any nursing questions or concerns call Patti Broderick LPN at [phone] or Tanvi Rosario RN at [phone]  Please call during clinic hours Monday through Friday 8:00a - 4:00p if you have questions or you can contact us via Ookbee at anytime and we will reply during clinic hours.    Lab results will be communicated through My Chart or letter (if My Chart not used). Please call the clinic if you have not received communication after 1 week or if you have any questions.?  Clinic Fax: [phone]  __________________________________________________________________________    If labs were ordered today:    Please make an appointment to have them drawn at your convenience.     To schedule the Lab Appointment using Ookbee:  Select \"Schedule an Appointment\"  Select \"Lab Only\"  For \"A couple of questions\", select \"Other\"  For \"Which locations work for you?, select the location and set up the appointment    To schedule by phone call [phone] to schedule a lab only appointment at [facility].  ___________________________________________________________________________  Work with A Health !  Virtual Sessions are Available through [facility] Weight Management Clinics    To learn more, call to schedule a free, Health  Q&A appointment: [phone]     What is Health Coaching?  Do you know what you are supposed to do, but you just aren't doing it?  Then, HEALTH COACHING may help you!   Get unstuck and move forward with the support of a professionally trained NBC-HWC (National Board-Certified Health and ) who uses evidence-based approaches to help you move forward with healthy lifestyle changes in the areas of weight loss, stress management and overall well-being.    Health Coaches help you identify goals that will work best for you. Health Coaches provide support and encouragement with overcoming barriers and help you to find inspiration and motivation to lead a healthy lifestyle.    Option one:  Health Coaching 3-Pack; Three, 30-minute Health Coaching Visits, for $99  Visits are done virtually (phone or video)  This is a self pay service; we do not accept insurance for tosha coaching.    Option two:   The 24 week Plan; 11 Health Coaching Visits, and a 7 months subscription to [employer]-- on-demand fitness, nutrition and mindfulness classes, for $499 (employee discounts may be available). Participants will also meet regularly with a weight management Medical Provider and a Registered/Licensed Dietician.  This is a self-pay service; we do not accept insurance for health coaching.    To Schedule a free Health  Q&A appointment to learn more,  call [phone].  ____________________________________________________________________    [facility]   Healthy Lifestyle Virtual Support Group    Healthy Lifestyle Virtual Support Group?  This is 60 minutes of small group guided discussion, support and resources. All are  welcome who want a healthy lifestyle.  WHEN: Starting in July 2023, this group meets the 1st Friday of the month from 12:30 PM - 1:30 PM virtually using Microsoft Teams.    FACILITATOR: Led by National Board Certified Health and , Josette Silver [facility].   TO REGISTER: Please send an email to Josette [email or web address] to receive monthly invites to the group or if you have any questions about having a health .  Prior to the meeting, a link with directions on how to join the meeting will be sent to you.    2023 Meetings  May 19: Let's Talk  June 9: Create Your Coaching Toolkit: Learn How to  Yourself  July 7: Let's Talk  August 4: Benefits of Fiber with MILTON Pereyra  September 1: Show and Tell (share your aps, podcasts, recipes, hacks, books)  October 6 :Let's Talk  November 3: Introduction to Mindfulness   December 1: Let's Talk    If you would like bariatric surgery specific support group info please let your care team know.         Thank you,   [facility] Comprehensive Weight Management Team

## 2023-07-10 ENCOUNTER — TELEPHONE (OUTPATIENT)
Dept: ENDOCRINOLOGY | Facility: CLINIC | Age: 58
End: 2023-07-10
Payer: COMMERCIAL

## 2023-10-29 ENCOUNTER — HEALTH MAINTENANCE LETTER (OUTPATIENT)
Age: 58
End: 2023-10-29

## 2024-02-16 DIAGNOSIS — E66.09 CLASS 1 OBESITY DUE TO EXCESS CALORIES WITHOUT SERIOUS COMORBIDITY IN ADULT, UNSPECIFIED BMI: ICD-10-CM

## 2024-02-16 DIAGNOSIS — E66.811 CLASS 1 OBESITY DUE TO EXCESS CALORIES WITHOUT SERIOUS COMORBIDITY IN ADULT, UNSPECIFIED BMI: ICD-10-CM

## 2024-02-17 RX ORDER — PHENTERMINE HYDROCHLORIDE 30 MG/1
30 CAPSULE ORAL EVERY MORNING
Qty: 30 CAPSULE | Refills: 5 | OUTPATIENT
Start: 2024-02-17

## 2024-02-17 NOTE — TELEPHONE ENCOUNTER
phentermine (ADIPEX-P) 30 MG capsule   Refused. No appt. Called pharm. adam on [facility]  7/7/23  nv: none    Scheduling has been notified to contact the pt for appointment.

## 2024-02-19 NOTE — TELEPHONE ENCOUNTER
Refill denied at this time. Patient needs appointment with Nia Workman PA-C. [employer] message sent to patient to schedule appointment.

## 2024-02-20 DIAGNOSIS — E66.01 MORBID OBESITY (H): ICD-10-CM

## 2024-02-20 RX ORDER — PHENTERMINE HYDROCHLORIDE 15 MG/1
30 CAPSULE ORAL EVERY MORNING
Qty: 60 CAPSULE | Refills: 5 | Status: SHIPPED | OUTPATIENT
Start: 2024-02-20

## 2024-04-04 ENCOUNTER — OFFICE VISIT (OUTPATIENT)
Dept: OBGYN | Facility: CLINIC | Age: 59
End: 2024-04-04
Payer: COMMERCIAL

## 2024-04-04 VITALS
OXYGEN SATURATION: 100 % | DIASTOLIC BLOOD PRESSURE: 85 MMHG | HEART RATE: 90 BPM | BODY MASS INDEX: 25.32 KG/M2 | WEIGHT: 129 LBS | SYSTOLIC BLOOD PRESSURE: 141 MMHG | HEIGHT: 60 IN

## 2024-04-04 DIAGNOSIS — K59.09 CHRONIC CONSTIPATION: ICD-10-CM

## 2024-04-04 DIAGNOSIS — Z12.31 ENCOUNTER FOR SCREENING MAMMOGRAM FOR BREAST CANCER: ICD-10-CM

## 2024-04-04 DIAGNOSIS — Z78.0 MENOPAUSE: ICD-10-CM

## 2024-04-04 DIAGNOSIS — Z13.220 LIPID SCREENING: ICD-10-CM

## 2024-04-04 DIAGNOSIS — R63.5 WEIGHT GAIN: ICD-10-CM

## 2024-04-04 DIAGNOSIS — Z80.8 FAMILY HISTORY OF MALIGNANT MELANOMA: ICD-10-CM

## 2024-04-04 DIAGNOSIS — Z12.11 COLON CANCER SCREENING: ICD-10-CM

## 2024-04-04 DIAGNOSIS — E66.3 OVERWEIGHT: ICD-10-CM

## 2024-04-04 DIAGNOSIS — Z01.419 WELL WOMAN EXAM WITH ROUTINE GYNECOLOGICAL EXAM: Primary | ICD-10-CM

## 2024-04-04 PROCEDURE — 99213 OFFICE O/P EST LOW 20 MIN: CPT | Mod: 25 | Performed by: OBSTETRICS & GYNECOLOGY

## 2024-04-04 PROCEDURE — 99386 PREV VISIT NEW AGE 40-64: CPT | Performed by: OBSTETRICS & GYNECOLOGY

## 2024-04-04 PROCEDURE — 99459 PELVIC EXAMINATION: CPT | Performed by: OBSTETRICS & GYNECOLOGY

## 2024-04-04 NOTE — PROGRESS NOTES
"Margo is a 59 year old  who presents for annual exam.  No vaginal bleeding noted.     Besides routine health maintenance, patient would like to discuss numerous concerns.  Weight management issues:  Patient reports that in her history she experienced a rapid weight loss with avid exercise and diet.  She reports that she later found out this made her metabolism abnormal and caused her to gain a significant amount of weight despite low calorie intake and high level of activity.  She works with the comprehensive weight management clinic for this issue and takes phentermine.   She has noted increased weight in recent history despite previously being able to maintain 115-125lbs with her regimen.  She is wondering why this is happening and if there is anything we can do to investigate further.  Patient reports she is not sure if she is in menopause.  States she was a \"late dalton\".  Does not know how old women in her family were during menopause.  She is s/p TLH, BS.  She notes some rare hot flashes, but don't feel like they are back.  Has some sweats at night, but is typically able to roll over and go back to sleep quickly.  Does not find either of these symptoms bothersome.  Also notes some mood swings.  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  She is NOT interested in HRT or vaginal estrogen.  She reports a little bit of pink tinged discharge on the toilet paper after sex sometimes.  She denies any pain with intercourse.  She states her and her partner feel  they have adequate lubrication during sex.  She is wondering what this could be.  She denies any vaginal dryness.  Patient reports she feels are coming out of her urethra when she voids.  She states she is certain it is not coming from her vaginal area or the labia.    She reports she has chronic constipation.  She states she has tried stool softeners, laxatives, fiber, diet changes, " "and laxatives in the past and nothing works for her.  She does not use enemas because it reminds her of childhood trauma.  She attributes her chronic constipation to her overall metabolic issues.  Patient reports she has less eyebrow hair than she has had in the past.  States there are no other changes to her body hair.  I offered derm referral, which she first declined, but then she shared her father had numerous melanomas removed and so she would like to see them for a skin check.   GYNECOLOGIC HISTORY:  Menarche:   She is sexually active with 1male partner(s) and she is currently in monogamous relationship .    History sexually transmitted infections:No STD history  STI testing offered?  Declined  Estrogen replacement therapy: No  LETICIA exposure: No    History of abnormal Pap smear: No.  Does not require any future paps due to s/p TLH  Family history of breast CA: No  Family history of uterine/ovarian CA: No  Family history of colon CA: No    HEALTH MAINTENANCE:  Cholesterol: (No components found for: \"CHOL2\" ) History of abnormal lipids: No  Mammo: nq . History of abnormal Mammo:  na  Regular Self Breast Exams: No  Colonoscopy: na History of abnormal Colonoscopy:  na  Dexa: na History of abnormal Dexa:  na  Calcium/Vitamin D intake: source:  dairy Adequate? Yes    HISTORY:  OB History    Para Term  AB Living   2 0 0 0 0 2   SAB IAB Ectopic Multiple Live Births   0 0 0 0 2      # Outcome Date GA Lbr Olu/2nd Weight Sex Delivery Anes PTL Lv   2 Term      Vag-Spont   FAREED   1 Term      Vag-Spont   FAREED     Past Medical History:   Diagnosis Date    Chronic depression     Gilbert's syndrome     Obesity, unspecified     Other and unspecified hyperlipidemia      Past Surgical History:   Procedure Laterality Date    HC TOOTH EXTRACTION W/FORCEP      LAPAROSCOPIC ASSISTED HYSTERECTOMY VAGINAL N/A 2018    Procedure: LAPAROSCOPIC ASSISTED HYSTERECTOMY VAGINAL;  LAPAROSCOPIC ASSISTED VAGINAL HYSTERECTOMY " BILATERAL SALPINGECTOMY ;  Surgeon: Hiwot Tim MD;  Location: [facility]    LAPAROSCOPIC SALPINGECTOMY Bilateral 2018    Procedure: LAPAROSCOPIC SALPINGECTOMY;;  Surgeon: Hiwot Tim MD;  Location: [facility]     Family History   Problem Relation Age of Onset    Hypertension Mother     Diabetes Father     Hypertension Father     Lung Cancer Father     Melanoma Father      Social History     Exercises and walks a minimum of 10,000 steps per day 5 times per week.    Current Outpatient Medications:     Multiple Vitamins-Minerals (MULTIVITAMIN ADULT) CHEW, Take 2 chew tab by mouth daily, Disp: , Rfl:     phentermine (ADIPEX-P) 15 MG capsule, Take 2 capsules (30 mg) by mouth every morning, Disp: 60 capsule, Rfl: 5     Allergies   Allergen Reactions    Food      Apricot, mangos, peaches       Past medical, surgical, social and family history were reviewed and updated in EPIC.    EXAM:  BP (!) 141/85   Pulse 90   Ht 1.524 m (5')   Wt 58.5 kg (129 lb)   LMP 2018 (Approximate)   SpO2 100%   BMI 25.19 kg/m     BMI: Body mass index is 25.19 kg/m .  Constitutional: healthy, alert and no distress  Head: Normocephalic. No masses, lesions, tenderness or abnormalities  Neck: Neck supple. Trachea midline. No adenopathy. Thyroid symmetric, normal size.   Cardiovascular: regular rate and rhythm  Respiratory: clear to auscultation bilaterally    Breasts: normal without suspicious masses, skin changes or axillary nodes.  Gastrointestinal: Abdomen soft, non-tender, palpable stool burden  : atrophic appearing vaginal mucosa, normal appearing external genitalia, no vaginal discharge, no vaginal bleeding  Musculoskeletal: extremities normal  Skin: no suspicious lesions or rashes  Psychiatric: Affect appropriate, cooperative,mentation appears normal.         ASSESSMENT:  59 year old  with satisfactory annual exam.      (Z01.419) Well woman exam with routine gynecological exam  (primary encounter  diagnosis)  COUNSELING:   Reviewed preventive health counseling, as reflected in patient instructions       Regular exercise       Healthy diet/nutrition       Colorectal Cancer Screening       Consider Hep C screening for all patients one time for ages 18-79 years       HIV screeninx in teen years, 1x in adult years, and at intervals if high risk       (Grisel)menopause management       Breast cancer screening   reports that she has never smoked. She has never used smokeless tobacco.    Body mass index is 25.19 kg/m .    (K59.09) Chronic constipation  Comment: Patient with extensive history of chronic constipation and report of multiple failed constipation medications.  Discussed options for colace, senna-s, miralax, enemas, or suppositories.  Patient states they don't work for her.  Recommend GI consultation and patient is also due for colonoscopy.  Plan: Adult GI  Referral - Consult Only    (Z12.11) Colon cancer screening  Comment: due.  She reports issues with anesthesia post op.  This was noted in colonoscopy order.  Plan: Colonoscopy Screening  Referral    (Z12.31) Encounter for screening mammogram for breast cancer  Comment: Patient states she has no intention of getting a mammogram in the future.  I explained importance, the rate of breast cancer is [place] population, and the better outcomes when breast cancer is caught early.  She agreed to me placing order if she changes her mind and decides in the future to get a mammogram.  Plan: *MA Screening Digital Bilateral    (Z78.0) Menopause  Comment: Patient questioning if she is in menopause.  Average age of menopause in [place] is 50.  Explained this to patient.  Unable to use menses as a way to determine since s/p TLH.  Offered FSH and estradiol testing for more data and patient would like to do this.  Reviewed symptoms of menopause such as hot flashes and night sweat not bothersome to her, thus she is not a candidate for HRT.  She reiterates she is  not interested in HRT or vaginal estrogen for vaginal atrophy.  Also reviewed vaginal exam findings c/w vaginal atrophy and likely blood tinged discharge after sex is due to micro abrasion.  Recommend use of water or silicone based lubricant during intercourse.   Plan: Follicle stimulating hormone, Estradiol    (R63.5) Weight gain  Comment: Patient reports weight gain.  Will rule out thyroid abnormality as potential contributing factor.  Explained weight gain is common after menopause.  Encouraged patient to continue to work with comprehensive weight management clinic for optimization  Plan: TSH with free T4 reflex    (E66.3) Overweight  Comment: patient due for diabetes screening  Plan: Hemoglobin A1c    (Z13.220) Lipid screening  Comment: due for lipid screen  Plan: Lipid panel reflex to direct LDL Fasting    (Z80.8) Family history of malignant melanoma  Comment: Patient reports family history of melanoma.  Sent to derm for skin checks.   Plan: Adult Dermatology  Referral          Sera Turner MD

## 2024-04-08 NOTE — TELEPHONE ENCOUNTER
REFERRAL INFORMATION:  Referring Provider:  Sera Turner MD   Referring Clinic:  [place]   Reason for Visit/Diagnosis: Chronic constipation      FUTURE VISIT INFORMATION:  Appointment Date: 4/15/2024  Appointment Time:      NOTES STATUS DETAILS   OFFICE NOTE from Referring Provider Internal 4/4/2024 OV with LANI Turner   OFFICE NOTE from Other Specialist N/A    HOSPITAL DISCHARGE SUMMARY/  ED VISITS N/A    OPERATIVE REPORT N/A    MEDICATION LIST Internal         ENDOSCOPY  N/A    COLONOSCOPY N/A    IMAGING (CT, MRI, EGD, MRCP, Small Bowel Follow Through/SBT, MR/CT Enterography) N/A

## 2024-04-09 ENCOUNTER — LAB (OUTPATIENT)
Dept: LAB | Facility: CLINIC | Age: 59
End: 2024-04-09
Payer: COMMERCIAL

## 2024-04-09 DIAGNOSIS — E66.3 OVERWEIGHT: ICD-10-CM

## 2024-04-09 DIAGNOSIS — Z13.220 LIPID SCREENING: ICD-10-CM

## 2024-04-09 DIAGNOSIS — E66.811 CLASS 1 OBESITY DUE TO EXCESS CALORIES WITHOUT SERIOUS COMORBIDITY IN ADULT, UNSPECIFIED BMI: ICD-10-CM

## 2024-04-09 DIAGNOSIS — Z78.0 MENOPAUSE: ICD-10-CM

## 2024-04-09 DIAGNOSIS — R63.5 WEIGHT GAIN: ICD-10-CM

## 2024-04-09 DIAGNOSIS — E66.09 CLASS 1 OBESITY DUE TO EXCESS CALORIES WITHOUT SERIOUS COMORBIDITY IN ADULT, UNSPECIFIED BMI: ICD-10-CM

## 2024-04-09 LAB
ANION GAP SERPL CALCULATED.3IONS-SCNC: 12 MMOL/L (ref 7–15)
BUN SERPL-MCNC: 14.6 MG/DL (ref 8–23)
CALCIUM SERPL-MCNC: 9.8 MG/DL (ref 8.6–10)
CHLORIDE SERPL-SCNC: 104 MMOL/L (ref 98–107)
CHOLEST SERPL-MCNC: 264 MG/DL
CREAT SERPL-MCNC: 0.77 MG/DL (ref 0.51–0.95)
DEPRECATED HCO3 PLAS-SCNC: 27 MMOL/L (ref 22–29)
EGFRCR SERPLBLD CKD-EPI 2021: 88 ML/MIN/1.73M2
ESTRADIOL SERPL-MCNC: 18 PG/ML
FASTING STATUS PATIENT QL REPORTED: YES
FSH SERPL IRP2-ACNC: 55.2 MIU/ML
GLUCOSE SERPL-MCNC: 100 MG/DL (ref 70–99)
HBA1C MFR BLD: 4.7 % (ref 0–5.6)
HDLC SERPL-MCNC: 71 MG/DL
LDLC SERPL CALC-MCNC: 166 MG/DL
NONHDLC SERPL-MCNC: 193 MG/DL
POTASSIUM SERPL-SCNC: 4.3 MMOL/L (ref 3.4–5.3)
SODIUM SERPL-SCNC: 143 MMOL/L (ref 135–145)
TRIGL SERPL-MCNC: 137 MG/DL
TSH SERPL DL<=0.005 MIU/L-ACNC: 1.46 UIU/ML (ref 0.3–4.2)

## 2024-04-09 PROCEDURE — 82670 ASSAY OF TOTAL ESTRADIOL: CPT

## 2024-04-09 PROCEDURE — 80061 LIPID PANEL: CPT

## 2024-04-09 PROCEDURE — 80048 BASIC METABOLIC PNL TOTAL CA: CPT

## 2024-04-09 PROCEDURE — 83036 HEMOGLOBIN GLYCOSYLATED A1C: CPT

## 2024-04-09 PROCEDURE — 83001 ASSAY OF GONADOTROPIN (FSH): CPT

## 2024-04-09 PROCEDURE — 84443 ASSAY THYROID STIM HORMONE: CPT

## 2024-04-09 PROCEDURE — 36415 COLL VENOUS BLD VENIPUNCTURE: CPT

## 2024-04-15 ENCOUNTER — TELEPHONE (OUTPATIENT)
Dept: GASTROENTEROLOGY | Facility: CLINIC | Age: 59
End: 2024-04-15

## 2024-04-15 ENCOUNTER — HOSPITAL ENCOUNTER (OUTPATIENT)
Facility: CLINIC | Age: 59
End: 2024-04-15
Attending: COLON & RECTAL SURGERY | Admitting: COLON & RECTAL SURGERY
Payer: COMMERCIAL

## 2024-04-15 ENCOUNTER — VIRTUAL VISIT (OUTPATIENT)
Dept: GASTROENTEROLOGY | Facility: CLINIC | Age: 59
End: 2024-04-15
Attending: OBSTETRICS & GYNECOLOGY
Payer: COMMERCIAL

## 2024-04-15 ENCOUNTER — PRE VISIT (OUTPATIENT)
Dept: GASTROENTEROLOGY | Facility: CLINIC | Age: 59
End: 2024-04-15

## 2024-04-15 DIAGNOSIS — K59.09 CHRONIC CONSTIPATION: ICD-10-CM

## 2024-04-15 DIAGNOSIS — Z12.11 SPECIAL SCREENING FOR MALIGNANT NEOPLASMS, COLON: Primary | ICD-10-CM

## 2024-04-15 PROCEDURE — 99203 OFFICE O/P NEW LOW 30 MIN: CPT | Mod: 95 | Performed by: PHYSICIAN ASSISTANT

## 2024-04-15 NOTE — TELEPHONE ENCOUNTER
"Endoscopy Scheduling Screen    Have you had a positive Covid test in the last 14 days?  No    What is your communication preference for Instructions and/or Bowel Prep?   MyChart    What insurance is in the chart?  Other:      Ordering/Referring Provider:     DEBBIE ZHANG      (If ordering provider performs procedure, schedule with ordering provider unless otherwise instructed. )    BMI: Estimated body mass index is 25.19 kg/m  as calculated from the following:    Height as of 4/4/24: 1.524 m (5').    Weight as of 4/4/24: 58.5 kg (129 lb).     Sedation Ordered  MAC/deep sedation.   BMI<= 45 45 < BMI <= 48 48 < BMI < = 50  BMI > 50   No Restrictions No MG ASC  No [facility]  [place] [facility] with exceptions Hospital Only OR Only       Do you have a history of malignant hyperthermia?  No    (Females) Are you currently pregnant?   No     Have you been diagnosed or told you have pulmonary hypertension?   No    Do you have an LVAD?  No    Have you been told you have moderate to severe sleep apnea?  No    Have you been told you have COPD, asthma, or any other lung disease?  No    Do you have any heart conditions?  No     Have you ever had or are you waiting for an organ transplant?  No. Continue scheduling, no site restrictions.    Have you had a stroke or transient ischemic attack (TIA aka \"mini stroke\" in the last 6 months?   No    Have you been diagnosed with or been told you have cirrhosis of the liver?   No    Are you currently on dialysis?   No    Do you need assistance transferring?   No    BMI: Estimated body mass index is 25.19 kg/m  as calculated from the following:    Height as of 4/4/24: 1.524 m (5').    Weight as of 4/4/24: 58.5 kg (129 lb).     Is patients BMI > 40 and scheduling location [facility]?  No    Do you take an injectable medication for weight loss or diabetes (excluding insulin)?  No    Do you take the medication Naltrexone?  No    Do you take blood thinners?  No       Prep   Are you currently on dialysis or " do you have chronic kidney disease?  No    Do you have a diagnosis of diabetes?  No    Do you have a diagnosis of cystic fibrosis (CF)?  No    On a regular basis do you go 3 -5 days between bowel movements?  Yes (Extended Prep)    BMI > 40?  No    Preferred Pharmacy:    [id number] - [place], [place] - [address]  [address]  [place] [address]  Phone: [phone] Fax: [phone]      Final Scheduling Details     Procedure scheduled  Colonoscopy    Surgeon:  Osmany     Date of procedure:  7/9     Pre-OP / PAC:   No - Not required for this site.    Location  [facility] - Patient preference.    Sedation   Moderate Sedation - Per RN assessment.  Patient is having md update orders for sedation as she can't be put out for procedures and think she misunderstood what she meant      Patient Reminders:   You will receive a call from a Nurse to review instructions and health history.  This assessment must be completed prior to your procedure.  Failure to complete the Nurse assessment may result in the procedure being cancelled.      On the day of your procedure, please designate an adult(s) who can drive you home stay with you for the next 24 hours. The medicines used in the exam will make you sleepy. You will not be able to drive.      You cannot take public transportation, ride share services, or non-medical taxi service without a responsible caregiver.  Medical transport services are allowed with the requirement that a responsible caregiver will receive you at your destination.  We require that drivers and caregivers are confirmed prior to your procedure.

## 2024-04-15 NOTE — NURSING NOTE
Is the patient currently in the state of [place]? YES    Visit mode:VIDEO    If the visit is dropped, the patient can be reconnected by: VIDEO VISIT: Text to cell phone:   Telephone Information:   Mobile [phone]       Will anyone else be joining the visit? NO  (If patient encounters technical issues they should call [phone]    How would you like to obtain your AVS? MyChart    Are changes needed to the allergy or medication list? No    Are refills needed on medications prescribed by this physician?     Reason for visit: Consult    James SOLIS

## 2024-04-15 NOTE — PATIENT INSTRUCTIONS
It was a pleasure taking care of you today.  I've included a brief summary of our discussion and care plan from today's visit below.  Please review this information with your primary care provider.  ______________________________________________________________________    My recommendations are summarized as follows:    Goal: Improvement in stool frequency to reduce occurrence of straining, bloating, and abdominal discomfort.    Daily constipation plan:  -- Miralax: Miralax 1 capful daily, titrating to goal, up to 3 capfuls daily. If you have not had a bowel movement for 2-3 days, or if you feel that you straining, incompletely evacuating, plan to augment your daily plan by increasing Miralax dose, up to 3 capfuls daily, until stooling as resumed, and then return to previous dosing.  -- Increase dietary fiber as able to tolerate, with goal 20-30 grams daily. Prunes can be a great source of fiber and can be used daily to help with bowel movements. If certain foods are difficult for you to tolerate, consider meeting with our nutrition team to help you.  -- Fiber: After using Miralax for about 2 weeks, you can add in soluble fiber supplement (such as Citrucel, Metamucil, psyllium husk). Start with 1/2 tablespoon in 8 oz water daily, increase slowly to effect. A good goal is 1 tablespoon daily, with a maximum of 3 tablespoons daily. This helps with stool consistency.  - Try to stay active with at least 150 minutes of moderate intensity activity per week. This can include brisk walking, active gardening, cycling, yoga, pilates, etc.   - Try to stay hydrated, goal 48-64 oz of non-caffeinated fluid daily    Constipation back-up plan: If you have not had a bowel movement for 2-3 days, or if you feel that you straining, incompletely evacuating, plan to augment your daily plan by:  - Miralax: Increasing Miralax to up to 3 capfuls daily, returning to daily maintenance dose above, once stools have resumed  - Magnesium:  Add in  magnesium oxide 400 mg once to twice daily  - Products containing senna (ie Senakot, SmoothMove tea) can be used over a short period of time, but please be mindful that your bowels can develop a dependence to senna products, meaning your bowels will rely on Senna to produce a bowel movement.    Emergency back-up plan: If you continue to feel constipated and need more help:  -- If it has been 3 days without a BM, consider taking 1 bottle of magnesium citrate and/or 1 glycerin suppository (insert rounded end, not pointed end, first)  - If this is not effective, please call us.    -- Referral to [facility]. Our team will fax a referral for you.  [address] [facility] [address]  [place] [address]  Phone: [phone]        -- please see scheduling information provided below     Return to GI Clinic in 3 months to review your progress or after colonoscopy has been completed.   ______________________________________________________________________    How do I schedule labs, imaging studies, or procedures that were ordered in clinic today?     Labs: To schedule lab appointment at the [facility], use my chart or call [phone]. If you have a [facility] lab closer to home where you are regularly seen you can give them a call.     Procedures: If a colonoscopy, upper endoscopy, breath test, esophageal manometry, or pH impedence was ordered today, our endoscopy team will call you to schedule this. If you have not heard from our endoscopy team within a week, please call [phone] to schedule.     Imaging Studies: If you were scheduled for a CT scan, X-ray, MRI, ultrasound, HIDA scan or other imaging study, please call [phone] to have this scheduled.     Referral: If a referral to another specialty was ordered, expect a phone call or follow instructions above. If you have not heard from anyone regarding your referral in a week, please call our clinic to  check the status.     Who do I call with any questions after my visit?  Please be in touch if there are any further questions that arise following today's visit.  There are multiple ways to contact your gastroenterology care team.      During business hours, you may reach a Gastroenterology nurse at [phone]    To schedule or reschedule an appointment, please call [phone].     You can always send a secure message through [employer].  [employer] messages are answered by your nurse or doctor typically within 24 hours.  Please allow extra time on weekends and holidays.      For urgent/emergent questions after business hours, you may reach the on-call GI Fellow by contacting the [facility]  at [phone].     How will I get the results of any tests ordered?    You will receive all of your results.  If you have signed up for [employer], any tests ordered at your visit will be available to you after your provider reviews them.  Typically this takes 1-2 weeks.  If there are urgent results that require a change in your care plan, your provider or nurse will call you to discuss the next steps.      What is [employer]?  [employer] is a secure way for you to access all of your healthcare records from the [facility].  It is a web based computer program, so you can sign on to it from any location.  It also allows you to send secure messages to your care team.  I recommend signing up for [employer] access if you have not already done so and are comfortable with using a computer.      How to I schedule a follow-up visit?  If you did not schedule a follow-up visit today, please call [phone] to schedule a follow-up office visit.      Sincerely,    Dalia Ruano PA-C  Division of Gastroenterology, Hepatology & Nutrition  [facility]

## 2024-04-15 NOTE — PROGRESS NOTES
GI CLINIC VISIT    ASSESSMENT/PLAN:  58 y/o F with pmhx of Gilbert's syndrome, presents for evaluation of chronic constipation.    #constipation  Patient reports a long history of irregular bowel movements since she was a child but she feels that she has had issues with worsening constipation particularly over the last year.  She can often go many days without having a bowel movement and feels that she is unable to evacuate completely.  She has not tried any over-the-counter laxatives, but instead has try to increase her fluid intake as well as dietary fiber without much improvement in her symptoms.  She has never had a colonoscopy and her OB/GYN did order a colonoscopy for colorectal cancer screening so recommended that she schedule this.  In the meantime I recommended that she trial MiraLAX 1 capful daily, in addition to a fiber supplementation, did review constipation plan.  Will also place a referral to the pelvic floor center.  Finally we will have her obtain celiac serologies that she has never had celiac testing in the past either.  -- Schedule colonoscopy.  --Obtain celiac testing.  -- Review constipation plan and start MiraLAX.  -- Future considerations: If no improvement in constipation with consistent use of daily MiraLAX for 3 months could trial a prescription medication such as Linzess.       Colorectal cancer screening: Ordered by OB/GYN, patient will need to schedule this.    RTC 3 months or after c-scope has been completed.    Thank you for this consultation.  It was a pleasure to participate in the care of this patient; please contact us with any further questions.       30 minutes spent on the date of the encounter doing chart review, review of test results, patient visit, and documentation    This note was created with voice recognition software, and while reviewed for accuracy, typos may remain.    Dalia Ruano PA-C  Division of Gastroenterology, Hepatology and Nutrition  [facility]  [facility]      HPI  60 y/o F with pmhx of Gilbert's syndrome, presents for evaluation of chronic constipation.    Patient reports that since she was a child, she has had irregular bowel movements. She will generally have a BM every 3-4 days, and then have a small incomplete evacuation, which she describes as a bristol type 1 stool. She will then go another several days without a BM, and then will finally have a complete bowel movement. She notes this started several months ago, and constipation symptoms have worsened over the last year. Prior to a few months ago, she would have a BM daily or EOD, but described as an incomplete evacuation.  Denies BRBPR. She reports associated abdominal bloating with generalized abdominal discomfort. Denies nausea or vomiting, but notes that on rare occasions will it has been an extended period of time without having a BM, she can feel mildly nauseous. She has tried to increase her fluid intake, has tried to eat high fiber food (dried fruit, figs, dried cherries, etc.) She has tried metamucil in the past, took it for a couple of weeks at a time, using a couple of tablespoons. She has not really tried OTC laxatives.     Takes aleve rarely. Denies Tylenol. Denies use of OTC herbal supplements/weight loss products.      Social alcohol.  Denies tobacco products. No recreational drug use.     No family history of GI related malignancy (esophageal, gastric, pancreatic, liver or colon) or family history of IBD/celiac disease.       ROS:    No fevers or chills  No weight loss  No shortness of breath or wheezing  No chest pain or pressure  No odynophagia or dysphagia  No BRBPR, hematochezia, melena  No anxiety or depression      PROBLEM LIST  Patient Active Problem List    Diagnosis Date Noted    S/P vaginal hysterectomy 02/16/2018     Priority: Medium    Chronic fatigue syndrome      Priority: Medium    Class 1 obesity without serious comorbidity with body mass index  (BMI) of 30.0 to 30.9 in adult      Priority: Medium     Problem list name updated by automated process. Provider to review      Urticaria 03/09/2010     Priority: Medium    Subclinical hypothyroidism 09/10/2009     Priority: Medium    Hyperlipidemia 07/23/2009     Priority: Medium     Problem list name updated by automated process. Provider to review      Abnormal weight gain 07/23/2009     Priority: Medium    Family history of diabetes mellitus 07/23/2009     Priority: Medium    Gilbert syndrome 07/23/2009     Priority: Medium       PERTINENT PAST MEDICAL HISTORY:    Past Medical History:   Diagnosis Date    Chronic depression     Gilbert's syndrome     Obesity, unspecified     Other and unspecified hyperlipidemia        PREVIOUS SURGERIES:    Past Surgical History:   Procedure Laterality Date    HC TOOTH EXTRACTION W/FORCEP      LAPAROSCOPIC ASSISTED HYSTERECTOMY VAGINAL N/A 2/16/2018    Procedure: LAPAROSCOPIC ASSISTED HYSTERECTOMY VAGINAL;  LAPAROSCOPIC ASSISTED VAGINAL HYSTERECTOMY BILATERAL SALPINGECTOMY ;  Surgeon: Hiwot Tim MD;  Location: [facility]    LAPAROSCOPIC SALPINGECTOMY Bilateral 2/16/2018    Procedure: LAPAROSCOPIC SALPINGECTOMY;;  Surgeon: Hiwot Tim MD;  Location: [facility]       PREVIOUS ENDOSCOPY:  None.    ALLERGIES:     Allergies   Allergen Reactions    Food      Apricot, mangos, peaches       PERTINENT MEDICATIONS:    Current Outpatient Medications:     Multiple Vitamins-Minerals (MULTIVITAMIN ADULT) CHEW, Take 2 chew tab by mouth daily, Disp: , Rfl:     phentermine (ADIPEX-P) 15 MG capsule, Take 2 capsules (30 mg) by mouth every morning, Disp: 60 capsule, Rfl: 5    SOCIAL HISTORY:    Social History     Socioeconomic History    Marital status:      Spouse name: Not on file    Number of children: Not on file    Years of education: Not on file    Highest education level: Not on file   Occupational History    Not on file   Tobacco Use    Smoking status: Never    Smokeless  tobacco: Never   Vaping Use    Vaping status: Never Used   Substance and Sexual Activity    Alcohol use: No    Drug use: No    Sexual activity: Not on file   Other Topics Concern    Parent/sibling w/ CABG, MI or angioplasty before 65F 55M? Not Asked   Social History Narrative    Not on file     Social Determinants of Health     Financial Resource Strain: Not on file   Food Insecurity: Not on file   Transportation Needs: Not on file   Physical Activity: Not on file   Stress: Not on file   Social Connections: Not on file   Interpersonal Safety: Not on file   Housing Stability: Not on file       FAMILY HISTORY:  FH of CRC: None  FH of IBD: None  Family History   Problem Relation Age of Onset    Hypertension Mother     Diabetes Father     Hypertension Father     Lung Cancer Father     Melanoma Father        Past/family/social history reviewed and no changes    PHYSICAL EXAMINATION:  General: Patient appears well in no acute distress.   Skin: No visualized rash or lesions on visualized skin  Eyes: EOMI, no erythema, sclera icterus or discharge noted  Resp: Appears to be breathing comfortably without accessory muscle usage, speaking in full sentences, no cough  MSK: Appears to have normal range of motion based on visualized movements  Neurologic: No apparent tremors, facial movements symmetric  Psych: normal affect , alert and oriented        PERTINENT STUDIES:    Lab on 04/09/2024   Component Date Value Ref Range Status    Sodium 04/09/2024 143  135 - 145 mmol/L Final    Potassium 04/09/2024 4.3  3.4 - 5.3 mmol/L Final    Chloride 04/09/2024 104  98 - 107 mmol/L Final    Carbon Dioxide (CO2) 04/09/2024 27  22 - 29 mmol/L Final    Anion Gap 04/09/2024 12  7 - 15 mmol/L Final    Urea Nitrogen 04/09/2024 14.6  8.0 - 23.0 mg/dL Final    Creatinine 04/09/2024 0.77  0.51 - 0.95 mg/dL Final    GFR Estimate 04/09/2024 88  >60 mL/min/1.73m2 Final    Calcium 04/09/2024 9.8  8.6 - 10.0 mg/dL Final    Glucose 04/09/2024 100 (H)  70  - 99 mg/dL Final    Cholesterol 04/09/2024 264 (H)  <200 mg/dL Final    Triglycerides 04/09/2024 137  <150 mg/dL Final    Direct Measure HDL 04/09/2024 71  >=50 mg/dL Final    LDL Cholesterol Calculated 04/09/2024 166 (H)  <=100 mg/dL Final    Non HDL Cholesterol 04/09/2024 193 (H)  <130 mg/dL Final    Patient Fasting > 8hrs? 04/09/2024 Yes   Final    Hemoglobin A1C 04/09/2024 4.7  0.0 - 5.6 % Final    TSH 04/09/2024 1.46  0.30 - 4.20 uIU/mL Final    FSH 04/09/2024 55.2  mIU/mL Final    Estradiol 04/09/2024 18  pg/mL Final         Video-Visit Details    Video Start Time: 8:45 AM    Type of service:  Video Visit    Video End Time: 9:05AM    Originating Location (pt. Location): Home        Distant Location (provider location):  Off-site    Platform used for Video Visit: [place]

## 2024-04-17 ENCOUNTER — DOCUMENTATION ONLY (OUTPATIENT)
Dept: GASTROENTEROLOGY | Facility: CLINIC | Age: 59
End: 2024-04-17
Payer: COMMERCIAL

## 2024-04-17 NOTE — PROGRESS NOTES
LPN faxed pelvic floor referral to the [facility] per Dalia Ruano PA-C order.  Fax number: [phone]  Fax transmission confirmed successful via right fax.    Dalia Ruano PA-C Lacher, Stacy, LPN Hi Stacy,    Can we send a referral in for the pelvic floor center for this patient?    Thanks,  KRISTIN Brewer LPN

## 2024-05-06 ENCOUNTER — VIRTUAL VISIT (OUTPATIENT)
Dept: ENDOCRINOLOGY | Facility: CLINIC | Age: 59
End: 2024-05-06
Payer: COMMERCIAL

## 2024-05-06 VITALS — HEIGHT: 61 IN | WEIGHT: 127 LBS | BODY MASS INDEX: 23.98 KG/M2

## 2024-05-06 DIAGNOSIS — E66.811 CLASS 1 OBESITY WITHOUT SERIOUS COMORBIDITY WITH BODY MASS INDEX (BMI) OF 30.0 TO 30.9 IN ADULT, UNSPECIFIED OBESITY TYPE: ICD-10-CM

## 2024-05-06 DIAGNOSIS — E80.4 GILBERT SYNDROME: ICD-10-CM

## 2024-05-06 PROCEDURE — 99214 OFFICE O/P EST MOD 30 MIN: CPT | Mod: 95

## 2024-05-06 RX ORDER — NALTREXONE HYDROCHLORIDE AND BUPROPION HYDROCHLORIDE 8; 90 MG/1; MG/1
TABLET, EXTENDED RELEASE ORAL
Qty: 120 TABLET | Refills: 0 | Status: CANCELLED | OUTPATIENT
Start: 2024-05-06

## 2024-05-06 ASSESSMENT — PAIN SCALES - GENERAL: PAINLEVEL: NO PAIN (0)

## 2024-05-06 NOTE — LETTER
2024       RE: Margo Chris  [address] [place] [address]     Dear Colleague,    Thank you for referring your patient, Margo Chris, to the [facility]. Please see a copy of my visit note below.    Virtual Visit Details    Type of service:  Video Visit     Originating Location (pt. Location): Home    Distant Location (provider location):  Off-site  Platform used for Video Visit: [facility] Medical Weight Management Note     Margo Chris  MRN:  [id number]  :  1965  RAUL:  2024    Dear Physician No Ref-Primary,    I had the pleasure of seeing your patient Margo Chris. She is a 59 year old female who I am continuing to see for treatment of obesity related to:         No data to display                Assessment & Plan  Problem List Items Addressed This Visit       Class 1 obesity without serious comorbidity with body mass index (BMI) of 30.0 to 30.9 in adult     Weight increase of around 10lb in the past 6 months. Feels like she is continuing to gain weight and is very nervous about this. Typically weight stable around 118lbs. History of hysterectomy, OBGYN checked labs and has completed menopause, but unsure of timing. Some increase stress over the past 6 months. Overall lifestyle is stable.     Has been on Phentermine for the past 10 years, but now does not feel like it is helping any more. Discussed increasing dose, but declined today. Would like to try another medication today. History of seasonal affect disorder, and was advised by therapist to try Contrave. Previously trialed Wellbutrin, no side effects, but was not helpful with weight loss on own. Has a hx of Gilbert's syndrome - will get updated LFTs prior to starting. Not taking opioids.          Gilbert syndrome    Relevant Orders    Hepatic panel     Stop Phentermine   Will get updated LFTs.  If stable, will start Contrave  Dr. Blackmon in 4 months   Nia Us in 7 months. Will reach out via [employer] in 1 months     INTERVAL HISTORY:  Dr. Blackmon - 12/2010  Starting weight - 163lb  Last seen by Dr. Blackmon 11/15/2021 - continue Phentermine 15-30mg once daily   1/2/2023 - continue Phentermine 15-30mg once daily - historically has interchanged dose as needed      Currently is around 10lbs from where she would like to be. But feels like she has been gaining weight over the past 8 months. Previously maintained well around 118lbs. Clothes are no longer fitting like they have over the past 10 years.     Has a hx of hysterectomy. Was already through menopause     Anti-obesity medication history    Current:   Phentermine 15-30mg - no longer feels like it has been helpful. Still taking phentermine. Is not interested in increasing today.     Was discussing medications with therapist and was advised to start Contrave. Does has a hx of seasonal affect disorder.     Past/Failed/contraindicated:   Wellbutrin - previously on for depression. But was not helpful on own for weight loss     Recent diet changes: Continues to eat very clean. Eating 3 meals a day. Calories around 1200 daily. Hyper focus on food choices, but no intrusive thoughts of food. Some possible increase hunger. Possibly some emotional/tired eating. Having increase hunger at night.     Recent exercise/activity changes: Joined [employer] - doing yoga. Will walk to the gym. [id number]. Walking 5xweek. Biking on Saturday - 25-30miles     Recent stressors: Some increase stress with father passing and semi-retiring.     Recent sleep changes: Stable     Vitamins/Labs: Bmp 4/9/2024 WNL. No recent LFTs - most recent found on chart review in 2010      CURRENT WEIGHT:   127 lbs 0 oz    Initial Weight (lbs): 163 lbs  Last Visits Weight: 54.4 kg (120 lb)  Cumulative weight loss (lbs): 36  Weight Loss Percentage: 22.09%    Wt Readings from Last 5  "Encounters:   05/06/24 57.6 kg (127 lb)   04/04/24 58.5 kg (129 lb)   07/07/23 54.4 kg (120 lb)   01/02/23 55.2 kg (121 lb 9.6 oz)   11/15/21 51.6 kg (113 lb 12.8 oz)             5/6/2024     6:59 AM   Changes and Difficulties   I have made the following changes to my diet since my last visit: None   With regards to my diet, I am still struggling with: Weight gain   I have made the following changes to my activity/exercise since my last visit: Yoga   With regards to my activity/exercise, I am still struggling with: None         MEDICATIONS:   Current Outpatient Medications   Medication Sig Dispense Refill    Multiple Vitamins-Minerals (MULTIVITAMIN ADULT) CHEW Take 2 chew tab by mouth daily      phentermine (ADIPEX-P) 15 MG capsule Take 2 capsules (30 mg) by mouth every morning 60 capsule 5           5/6/2024     6:59 AM   Weight Loss Medication History Reviewed With Patient   Which weight loss medications are you currently taking on a regular basis? Phentermine   Are you having any side effects from the weight loss medication that we have prescribed you? No     Anti-obesity medication ROS:    HEENT  Hx of glaucoma: No    Cardiovascular  CAD:No  HTN:No    Gastrointestinal  GERD:No  Constipation:Yes  Liver Dz:Yes - gilberts syndrome   H/O Pancreatitis:No    Psychiatric  Bipolar: No  Anxiety:No  Depression:Yes  History of alcohol/drug abuse: No  Hx of eating disorder:No    Endocrine  Personal or family hx of MTC or MEN2:No  Diabetes/prediabetes: No    Neurologic:  Hx of seizures: No  Hx of migraines: No  Memory Impairment: No      History of kidney stones: No  Kidney disease: No  Current birth control:  post menopausal    Taking Opioid/Narcotic: No        Objective   Ht 1.537 m (5' 0.51\")   Wt 57.6 kg (127 lb)   LMP 01/26/2018 (Approximate)   BMI 24.39 kg/m             PHYSICAL EXAM:    GENERAL: alert and no distress  EYES: Eyes grossly normal to inspection.  No discharge or erythema, or obvious " scleral/conjunctival abnormalities.  RESP: No audible wheeze, cough, or visible cyanosis.    SKIN: Visible skin clear. No significant rash, abnormal pigmentation or lesions.  NEURO: Cranial nerves grossly intact.  Mentation and speech appropriate for age.  PSYCH: Appropriate affect, tone, and pace of words        Sincerely,    Nia Workman PA-C      36 minutes spent by me on the date of the encounter doing chart review, history and exam, documentation and further activities per the note

## 2024-05-06 NOTE — PROGRESS NOTES
Virtual Visit Details    Type of service:  Video Visit     Originating Location (pt. Location): Home    Distant Location (provider location):  Off-site  Platform used for Video Visit: [facility] Medical Weight Management Note     Margo Chris  MRN:  [id number]  :  1965  RAUL:  2024    Dear Physician No Ref-Primary,    I had the pleasure of seeing your patient Margo Chris. She is a 59 year old female who I am continuing to see for treatment of obesity related to:         No data to display                Assessment & Plan   Problem List Items Addressed This Visit       Class 1 obesity without serious comorbidity with body mass index (BMI) of 30.0 to 30.9 in adult     Weight increase of around 10lb in the past 6 months. Feels like she is continuing to gain weight and is very nervous about this. Typically weight stable around 118lbs. History of hysterectomy, OBGYN checked labs and has completed menopause, but unsure of timing. Some increase stress over the past 6 months. Overall lifestyle is stable.     Has been on Phentermine for the past 10 years, but now does not feel like it is helping any more. Discussed increasing dose, but declined today. Would like to try another medication today. History of seasonal affect disorder, and was advised by therapist to try Contrave. Previously trialed Wellbutrin, no side effects, but was not helpful with weight loss on own. Has a hx of Gilbert's syndrome - will get updated LFTs prior to starting. Not taking opioids.          Gilbert syndrome    Relevant Orders    Hepatic panel     Stop Phentermine   Will get updated LFTs. If stable, will start Contrave  Dr. Blackmon in 4 months   Nia Us in 7 months. Will reach out via [employer] in 1 months     INTERVAL HISTORY:  Dr. Blackmon - 2010  Starting weight - 163lb  Last seen by Dr. Blackmon 11/15/2021 - continue Phentermine 15-30mg once daily   2023 - continue Phentermine 15-30mg once daily -  historically has interchanged dose as needed      Currently is around 10lbs from where she would like to be. But feels like she has been gaining weight over the past 8 months. Previously maintained well around 118lbs. Clothes are no longer fitting like they have over the past 10 years.     Has a hx of hysterectomy. Was already through menopause     Anti-obesity medication history    Current:   Phentermine 15-30mg - no longer feels like it has been helpful. Still taking phentermine. Is not interested in increasing today.     Was discussing medications with therapist and was advised to start Contrave. Does has a hx of seasonal affect disorder.     Past/Failed/contraindicated:   Wellbutrin - previously on for depression. But was not helpful on own for weight loss     Recent diet changes: Continues to eat very clean. Eating 3 meals a day. Calories around 1200 daily. Hyper focus on food choices, but no intrusive thoughts of food. Some possible increase hunger. Possibly some emotional/tired eating. Having increase hunger at night.     Recent exercise/activity changes: Joined [employer] - doing yoga. Will walk to the gym. [id number]. Walking 5xweek. Biking on Saturday - 25-30miles     Recent stressors: Some increase stress with father passing and semi-retiring.     Recent sleep changes: Stable     Vitamins/Labs: Bmp 4/9/2024 WNL. No recent LFTs - most recent found on chart review in 2010      CURRENT WEIGHT:   127 lbs 0 oz    Initial Weight (lbs): 163 lbs  Last Visits Weight: 54.4 kg (120 lb)  Cumulative weight loss (lbs): 36  Weight Loss Percentage: 22.09%    Wt Readings from Last 5 Encounters:   05/06/24 57.6 kg (127 lb)   04/04/24 58.5 kg (129 lb)   07/07/23 54.4 kg (120 lb)   01/02/23 55.2 kg (121 lb 9.6 oz)   11/15/21 51.6 kg (113 lb 12.8 oz)             5/6/2024     6:59 AM   Changes and Difficulties   I have made the following changes to my diet since my last visit: None   With regards to my diet, I am still  "struggling with: Weight gain   I have made the following changes to my activity/exercise since my last visit: Yoga   With regards to my activity/exercise, I am still struggling with: None         MEDICATIONS:   Current Outpatient Medications   Medication Sig Dispense Refill    Multiple Vitamins-Minerals (MULTIVITAMIN ADULT) CHEW Take 2 chew tab by mouth daily      phentermine (ADIPEX-P) 15 MG capsule Take 2 capsules (30 mg) by mouth every morning 60 capsule 5           5/6/2024     6:59 AM   Weight Loss Medication History Reviewed With Patient   Which weight loss medications are you currently taking on a regular basis? Phentermine   Are you having any side effects from the weight loss medication that we have prescribed you? No     Anti-obesity medication ROS:    HEENT  Hx of glaucoma: No    Cardiovascular  CAD:No  HTN:No    Gastrointestinal  GERD:No  Constipation:Yes  Liver Dz:Yes - gilberts syndrome   H/O Pancreatitis:No    Psychiatric  Bipolar: No  Anxiety:No  Depression:Yes  History of alcohol/drug abuse: No  Hx of eating disorder:No    Endocrine  Personal or family hx of MTC or MEN2:No  Diabetes/prediabetes: No    Neurologic:  Hx of seizures: No  Hx of migraines: No  Memory Impairment: No      History of kidney stones: No  Kidney disease: No  Current birth control:  post menopausal    Taking Opioid/Narcotic: No        Objective    Ht 1.537 m (5' 0.51\")   Wt 57.6 kg (127 lb)   LMP 01/26/2018 (Approximate)   BMI 24.39 kg/m             PHYSICAL EXAM:    GENERAL: alert and no distress  EYES: Eyes grossly normal to inspection.  No discharge or erythema, or obvious scleral/conjunctival abnormalities.  RESP: No audible wheeze, cough, or visible cyanosis.    SKIN: Visible skin clear. No significant rash, abnormal pigmentation or lesions.  NEURO: Cranial nerves grossly intact.  Mentation and speech appropriate for age.  PSYCH: Appropriate affect, tone, and pace of words        Sincerely,    Nia Workman, " PA-C      36 minutes spent by me on the date of the encounter doing chart review, history and exam, documentation and further activities per the note

## 2024-05-06 NOTE — NURSING NOTE
Is the patient currently in the state of [place]? YES    Visit mode:VIDEO    If the visit is dropped, the patient can be reconnected by: VIDEO VISIT: Text to cell phone:   Telephone Information:   Mobile [phone]       Will anyone else be joining the visit? NO  (If patient encounters technical issues they should call [phone])    How would you like to obtain your AVS? MyChart    Are changes needed to the allergy or medication list? No, Pt stated no changes to allergies, and Pt stated no med changes    Are refills needed on medications prescribed by this physician? NO    Reason for visit: RECHECK (AMBROCIO)    Geri SOLIS

## 2024-05-07 ENCOUNTER — LAB (OUTPATIENT)
Dept: LAB | Facility: CLINIC | Age: 59
End: 2024-05-07
Payer: COMMERCIAL

## 2024-05-07 DIAGNOSIS — E80.4 GILBERT SYNDROME: ICD-10-CM

## 2024-05-07 DIAGNOSIS — K59.09 CHRONIC CONSTIPATION: ICD-10-CM

## 2024-05-07 PROCEDURE — 80076 HEPATIC FUNCTION PANEL: CPT

## 2024-05-07 PROCEDURE — 36415 COLL VENOUS BLD VENIPUNCTURE: CPT

## 2024-05-07 PROCEDURE — 82784 ASSAY IGA/IGD/IGG/IGM EACH: CPT

## 2024-05-07 PROCEDURE — 86364 TISS TRNSGLTMNASE EA IG CLAS: CPT

## 2024-05-08 DIAGNOSIS — E66.811 CLASS 1 OBESITY WITHOUT SERIOUS COMORBIDITY WITH BODY MASS INDEX (BMI) OF 30.0 TO 30.9 IN ADULT, UNSPECIFIED OBESITY TYPE: Primary | ICD-10-CM

## 2024-05-08 LAB
ALBUMIN SERPL BCG-MCNC: 4.7 G/DL (ref 3.5–5.2)
ALP SERPL-CCNC: 94 U/L (ref 40–150)
ALT SERPL W P-5'-P-CCNC: 21 U/L (ref 0–50)
AST SERPL W P-5'-P-CCNC: 24 U/L (ref 0–45)
BILIRUB DIRECT SERPL-MCNC: 0.24 MG/DL (ref 0–0.3)
BILIRUB SERPL-MCNC: 1.2 MG/DL
IGA SERPL-MCNC: 266 MG/DL (ref 84–499)
PROT SERPL-MCNC: 7.4 G/DL (ref 6.4–8.3)
TTG IGA SER-ACNC: 0.7 U/ML
TTG IGG SER-ACNC: 0.9 U/ML

## 2024-05-08 RX ORDER — NALTREXONE HYDROCHLORIDE AND BUPROPION HYDROCHLORIDE 8; 90 MG/1; MG/1
TABLET, EXTENDED RELEASE ORAL
Qty: 120 TABLET | Refills: 0 | Status: SHIPPED | OUTPATIENT
Start: 2024-05-08

## 2024-05-08 NOTE — ASSESSMENT & PLAN NOTE
Weight increase of around 10lb in the past 6 months. Feels like she is continuing to gain weight and is very nervous about this. Typically weight stable around 118lbs. History of hysterectomy, OBGYN checked labs and has completed menopause, but unsure of timing. Some increase stress over the past 6 months. Overall lifestyle is stable.     Has been on Phentermine for the past 10 years, but now does not feel like it is helping any more. Discussed increasing dose, but declined today. Would like to try another medication today. History of seasonal affect disorder, and was advised by therapist to try Contrave. Previously trialed Wellbutrin, no side effects, but was not helpful with weight loss on own. Has a hx of Gilbert's syndrome - will get updated LFTs prior to starting. Not taking opioids.

## 2024-05-08 NOTE — PATIENT INSTRUCTIONS
"Mustapha Aragon,   Thank you for allowing us the privilege of caring for you. We hope we provided you with the excellent service you deserve.   Please let us know if there is anything else we can do for you so that we can be sure you are completely satisfied with your care experience.    To ensure the quality of our services you may be receiving a patient satisfaction survey from an independent patient satisfaction monitoring company.    The greatest compliment you can give is a \"Likely to Recommend\"    Your visit was with Nia Workman PA-C today.    Instructions per today's visit:     Stop Phentermine   Will get updated LFTs. If stable, will start Contrave  Dr. Blackmon in 4 months   Nia Us in 7 months. Will reach out via [employer] in 1 months    ___________________________________________________________________________  Important contact and scheduling information:  Please call our contact center at [phone] to schedule your next appointments.  For any nursing questions or concerns call Patti Broderick LPN at [phone] or Tanvi Rosario RN at [phone]  Please call during clinic hours Monday through Friday 8:00a - 4:00p if you have questions or you can contact us via [employer] at anytime and we will reply during clinic hours.    Lab results will be communicated through My Chart or letter (if My Chart not used). Please call the clinic if you have not received communication after 1 week or if you have any questions.?  Clinic Fax: [phone]  __________________________________________________________________________    If labs were ordered today:    Please make an appointment to have them drawn at your convenience.     To schedule the Lab Appointment using [employer]:  Select \"Schedule an Appointment\"  Select \"Lab Only\"  For \"A couple of questions\", select \"Other\"  For \"Which locations work for you?, select the location and set up the appointment    To schedule by phone call [phone] to schedule a lab " only appointment at any [facility] lab.  ___________________________________________________________________________  Work with [facility] !  Virtual Sessions are Available through [facility] Weight Management Clinics    To learn more, call to schedule a free, Health  Q&A appointment: [phone]     What is Health Coaching?  Do you know what you are supposed to do, but you just aren't doing it?  Then, HEALTH COACHING may help you!   Get unstuck and move forward with the support of a professionally trained NBC-HWC (National Board-Certified Health and ) who uses evidence-based approaches to help you move forward with healthy lifestyle changes in the areas of weight loss, stress management and overall well-being.    Health Coaches help you identify goals that will work best for you. Health Coaches provide support and encouragement with overcoming barriers and help you to find inspiration and motivation to lead a healthy lifestyle.    Option one:  Health Coaching 3-Pack; Three, 30-minute Health Coaching Visits, for $99  Visits are done virtually (phone or video)  This is a self pay service; we do not accept insurance for tosha coaching.    Option two:   The 24 week Plan; 11 Health Coaching Visits, and a 7 months subscription to [employer]-- on-demand fitness, nutrition and mindfulness classes, for $499 (employee discounts may be available). Participants will also meet regularly with a weight management Medical Provider and a Registered/Licensed Dietician.  This is a self-pay service; we do not accept insurance for health coaching.    To Schedule a free Health  Q&A appointment to learn more,  call [phone].  ____________________________________________________________________  [facility]  Healthy Lifestyle Group    Healthy Lifestyle Group  This is a 60 minute virtual coaching group for those who want to lead a healthier lifestyle.  "Come together to set goals and overcome barriers in a supportive group environment. We will address the four pillars of health--nutrition, exercise, sleep and emotional well-being.  This group is highly recommended for those who are participating in the 24 week Healthy Lifestyle Plan and our Health Coaching sessions.    WHEN: This group meets the first Friday of the month, 12:30 PM - 1:30 PM online, via a zoom meeting.      FACILITATOR: Led by National Board Certified Health and , Josette Silver [facility].    TO REGISTER: Please call the Call Center at [phone] to register. You will get an appointment to attend in [facility]. Fifteen minutes prior to the meeting, complete the e-check in and you will get the link to join the meeting.  There is no charge to attend this group and space is limited.      2023 and 2024 Meeting Topics and Dates:    November 3: Introduction to Mindfulness (Learn simple and effective mindfulness practices and how it can benefit you)    December 8: Let's Talk (guided discussion on our wins and challenges)    January 5: New Years Vision: Manifest your Best 2024! (Guided imagery,  journaling and discussion)    February 2: Let's Talk    March 1: 10 Percent Happier by Doug Almazan (Book Bites; a guided discussion on the nuggets of wisdom from favorite wellness books; no need to read the book but highly encouraged)    April 5: Let's Talk    May 3: \"Essentialism; The Disciplined Pursuit of Less by Max Cabrales (book bites discussion)    June 7: Let's Talk    July 5: NO MEETING, off for the 4th of July Holiday    August 2: The Blue Zones, Secrets for Living a Longer Life by Doug Ohara (book bites discussion)      If you would like bariatric surgery specific support group info please let your care team know.         Thank you,   [facility] Comprehensive Weight Management Team        CONTRAVE (bupropion and naltrexone)    We are considering starting Contrave. Contrave is specifically " prescribed for obesity. It is a combination of two medications, Naltrexone and Bupropione (Wellbutrin). The Bupropion helps lessen appetite and the Naltrexone works by blocking certain receptors in the brain and curbing cravings.      For some of our patients the medication works right away. Other patients don't feel much of a change but find they've lost weight. Like all weight loss medications, Contrave works best when you help it work. This means:  1. Having less tempting high calorie (fattening) food around the house or office. (For people with strong cravings this is very important.)   2. Staying away from situations or people that may trigger your cravings .   3. Eating out only one time or less each week.  4. Eating your meals at a table with the TV or computer off.    WARNING: This medication blocks the action of opioid type pain medications. If you routinely take any medication like Codeine, Oxycontin, Percocet, Morphine, Dilaudid or Methodone, do not take this until you have talked with weight management staff.     FYI- If you are planning on having an elective surgery, you should start titrating yourself off Contrave 4 weeks prior to surgery. This would entail decreasing the same way you started the medication, by taking one tablet less per week for 4 weeks, until you are able to stop the medication. If you need to stop it quicker, you can take 1 tablet in the morning and 1 tablet in the evening for 2 weeks, and then stop the medication. Please don't hesitate to call if you have any questions regarding this.    Dosing as follows:  Week 1- 1 tablet in the morning  Week 2- 1 tablet in the morning and 1 tablet at bedtime  Week 3- 2 tablets in the morning and 1 tablet at bedtime  Week 4 and thereafter- 2 tablets in the morning and 2 tablets at bedtime    Side-effects: The most common side effect include: nausea; constipation; headache; vomiting; dizziness; diarrhea; trouble sleeping; and dry mouth.     Prior  Authorization Process for Weight Management Medications: You are being prescribed a medication that will likely need to undergo a prior authorization.  A prior authorization is when the clinic needs to fill out a form that is sent to your insurance company to obtain coverage for that medication. The prescription will NOT automatically go to your pharmacy today if it needs a Prior Authorization. If the medication prior authorization is approved, the care team will contact you and the prescription will be released to your pharmacy. If denied, we will work to try and appeal the prior authorization if possible. The initial prior authorization process takes up to 5-10 business days and appeals can take up to 30 days. If you do not hear from us at the end of that time, you may contact the clinic.    This medication typically isn t covered by insurance and will require a prior authorization, which can take 1-2 weeks to review. Patients can visit [email or web address] and sign up for the Pharmacy savings program and receive the medication for $60-70 per month for 12 months if eligible.    For any questions or concerns please send a [employer] message to our team or call our weight management call center at [phone] during regular business hours. For questions during evenings or weekends your messages will be addressed during the next business day.  For emergencies please call 911 or seek immediate medical care.     (Do not stop taking it if you don't think it's working. For some people it works without them knowing it.)      In order to get refills of this or any medication we prescribe you must be seen in the medical weight mgmt clinic every 2-4 months. Please have your pharmacy fax a refill request to [phone].

## 2024-05-09 ENCOUNTER — TELEPHONE (OUTPATIENT)
Dept: ENDOCRINOLOGY | Facility: CLINIC | Age: 59
End: 2024-05-09
Payer: COMMERCIAL

## 2024-05-09 NOTE — LETTER
May 28, 2024    To: [facility]    RE: Margo Chris  [address] [place] [address]  : 1965  MRN: [id number]    To Whom It May Concern,    I am writing on behalf of my patient, Margo Chris to document the medical necessity of Contrave for the treatment of obesity. This letter provides information about the patient's medical history and diagnosis and a statement summarizing my treatment rationale.     Summary of Patient History and Diagnosis  I had the pleasure of seeing your patient Margo Chris. She is a 59 year old female who I am continuing to see for treatment of obesity.     Weight increase of around 10lb in the past 6 months. Feels like she is continuing to gain weight and is very nervous about this. Typically weight stable around 118lbs. History of hysterectomy, OBGYN checked labs and has completed menopause, but unsure of timing. Some increase stress over the past 6 months. Overall lifestyle is stable.      Has been on Phentermine for the past 10 years, but now does not feel like it is helping any more. Discussed increasing dose, but declined today. Would like to try another medication today. History of seasonal affect disorder, and was advised by therapist to try Contrave. Previously trialed Wellbutrin, no side effects, but was not helpful with weight loss on own. Has a hx of Gilbert's syndrome - will get updated LFTs prior to starting. Not taking opioids.     INTERVAL HISTORY:  Dr. Blackmon - 2010  Starting weight - 163lb  Last seen by Dr. Blackmon 11/15/2021 - continue Phentermine 15-30mg once daily   2023 - continue Phentermine 15-30mg once daily - historically has interchanged dose as needed      Currently is around 10lbs from where she would like to be. But feels like she has been gaining weight over the past 8 months. Previously maintained well around 118lbs. Clothes are no longer fitting like they have over the past 10 years.      Has a hx of  hysterectomy. Was already through menopause     Past/Failed/contraindicated:   Wellbutrin - previously on for depression. But was not helpful on own for weight loss      Recent diet changes: Continues to eat very clean. Eating 3 meals a day. Calories around 1200 daily. Hyper focus on food choices, but no intrusive thoughts of food. Some possible increase hunger. Possibly some emotional/tired eating. Having increase hunger at night.      Recent exercise/activity changes: Joined [employer] - doing yoga. Will walk to the gym. [id number]. Walking 5xweek. Biking on Saturday - 25-30miles        Treatment Rationale  Denial Reason(s): Criteria not met      Patient has history of HLD. Starting BMI with Dr. Blackmon 31.9 on 12/3/2010.    Duration  Up to 12 months.      Summary  In summary, Contrave is medically necessary for this patient s medical condition. Please call my office at [phone] if I can provide you with any additional information to approve my request. I look forward to receiving your timely response and approval of this request.     Sincerely,    Nia Workman PA-C   Additional Safety/Bands:

## 2024-05-09 NOTE — TELEPHONE ENCOUNTER
Prior Authorization Retail Medication Request    Medication/Dose: Contrave  Diagnosis and ICD code (if different than what is on RX):  Class 1 obesity without serious comorbidity with body mass index (BMI) of 30.0 to 30.9 in adult, unspecified obesity type [E66.9, Z68.30]  - Primary     New/renewal/insurance change PA/secondary ins. PA:  Previously Tried and Failed:  history of diet and exercise, Phentermine  Rationale:  I had the pleasure of seeing your patient Margo Chris. She is a 59 year old female who I am continuing to see for treatment of obesity.      Weight increase of around 10lb in the past 6 months. Feels like she is continuing to gain weight and is very nervous about this. Typically weight stable around 118lbs. History of hysterectomy, OBGYN checked labs and has completed menopause, but unsure of timing. Some increase stress over the past 6 months. Overall lifestyle is stable.      Has been on Phentermine for the past 10 years, but now does not feel like it is helping any more. Discussed increasing dose, but declined today. Would like to try another medication today. History of seasonal affect disorder, and was advised by therapist to try Contrave. Previously trialed Wellbutrin, no side effects, but was not helpful with weight loss on own. Has a hx of Gilbert's syndrome - will get updated LFTs prior to starting. Not taking opioids.      Anti-obesity medication history     Current:   Phentermine 15-30mg - no longer feels like it has been helpful. Still taking phentermine. Is not interested in increasing today.      Was discussing medications with therapist and was advised to start Contrave. Does has a hx of seasonal affect disorder.      Past/Failed/contraindicated:   Wellbutrin - previously on for depression. But was not helpful on own for weight loss      Recent diet changes: Continues to eat very clean. Eating 3 meals a day. Calories around 1200 daily. Hyper focus on food choices, but no  intrusive thoughts of food. Some possible increase hunger. Possibly some emotional/tired eating. Having increase hunger at night.      Recent exercise/activity changes: Joined [employer] - doing yoga. Will walk to the gym. [id number]. Walking 5xweek. Biking on Saturday - 25-30miles        Insurance   Primary:   Insurance ID:      Secondary (if applicable):  Insurance ID:      Pharmacy Information (if different than what is on RX)  Name:    [id number] - [place], [place] - [address]     Phone:  [phone]   Fax: [phone]

## 2024-05-11 ENCOUNTER — TELEPHONE (OUTPATIENT)
Dept: ENDOCRINOLOGY | Facility: CLINIC | Age: 59
End: 2024-05-11
Payer: COMMERCIAL

## 2024-05-23 NOTE — TELEPHONE ENCOUNTER
PA Initiation    Medication: CONTRAVE 8-90 MG PO TB12  Insurance Company: [employer] - Phone [phone] Fax [phone]  Pharmacy Filling the Rx: [id number] [place], [place] - [facility] [address]  Filling Pharmacy Phone: [phone]  Filling Pharmacy Fax:    Start Date: 5/23/2024

## 2024-05-28 NOTE — TELEPHONE ENCOUNTER
Medication Appeal Initiation    Medication: CONTRAVE 8-90 MG PO TB12  Appeal Start Date:  5/28/2024  Insurance Company: [employer] Phone:   Insurance Fax: [phone]  Comments:

## 2024-05-28 NOTE — TELEPHONE ENCOUNTER
PRIOR AUTHORIZATION DENIED    Medication: CONTRAVE 8-90 MG PO TB12  Insurance Company: [employer] - Phone [phone] Fax [phone]  Denial Date: 5/28/2024  Denial Reason(s): Criteria not met      Appeal Information:   Patient Notified: No

## 2024-05-28 NOTE — TELEPHONE ENCOUNTER
Medication Appeal Request    Please initiate an appeal for the requested medication: CONTRAVE 8-90 MG PO TB12    Has a letter of medical necessity been completed in EPIC?   yes    Any additional lab values/information to include?     Would you like to include any research articles?               If yes please include the hyperlink(s) below or fax to    [phone] for Specialty/Retail               [phone] for Infusion/Clinic Administered.                Include the patients name and MRN on the fax cover sheet.

## 2024-06-03 NOTE — TELEPHONE ENCOUNTER
Sent copy of form to patient via [employer]. Requested that she complete, sign and send back to begin the appeal process for Contrave.

## 2024-06-07 NOTE — TELEPHONE ENCOUNTER
No response back from patient. If we receive the consent, will reopen and appeal. Letter is already written.

## 2024-06-11 RX ORDER — BISACODYL 5 MG/1
TABLET, DELAYED RELEASE ORAL
Qty: 4 TABLET | Refills: 0 | Status: SHIPPED | OUTPATIENT
Start: 2024-06-11

## 2024-06-11 NOTE — TELEPHONE ENCOUNTER
Extended Golytely Bowel Prep . Instructions were sent via [employer]. Bowel prep was sent 6/11/2024 to [facility] [address] [facility] - [place], [place] - [address].     Venus Eastman RN Colorectal Cancer    Division of Gastroenterology at [facility]

## 2024-06-27 ENCOUNTER — TELEPHONE (OUTPATIENT)
Dept: GASTROENTEROLOGY | Facility: CLINIC | Age: 59
End: 2024-06-27
Payer: COMMERCIAL

## 2024-06-27 NOTE — TELEPHONE ENCOUNTER
Attempted to contact patient in order to complete pre assessment questions.     No answer. Left message to return call to [phone] option 4    Pre-op needed? NO    Callback required communication sent via [employer].    Monse Devine RN  Endoscopy Procedure Pre Assessment

## 2024-06-27 NOTE — TELEPHONE ENCOUNTER
"Per colonoscopy referral order: \"difficulty recovering/ metabolising the anesthesia with prior surgery\".  Please discuss with patient.    ------------------------------------------------------     Pre visit planning completed.      Procedure details:    Patient scheduled for Colonoscopy  on 7/9/24.     Arrival time: 0715. Procedure time 0800    Facility location: [facility]; [facility] [address]. Check in location: [facility].     Sedation type: Conscious sedation     Pre op exam needed? N/A    Indication for procedure: Screening, Chronic constipation      Chart review:     Electronic implanted devices? No    Recent diagnosis of diverticulitis within the last 6 weeks? No    Diabetic? No      Medication review:    Anticoagulants? No    NSAIDS? No NSAID medications per patient's medication list.  RN will verify with pre-assessment call.    Other medication HOLDING recommendations:  (Contrave) Naltrexone PO: HOLD 3 days before procedure.   Please confirm patient has stopped Phentermine.      Prep for procedure:     Bowel prep recommendation: Extended Golytely. Bowel prep prescription sent by [facility] nursing team on 6/11/24 to [facility] [id number] [facility] - [place], [place] - [address]   Due to: constipation noted or reported.     Prep instructions sent via [employer] by [facility] nursing team on 6/11/24.         Luh Barahona RN  Endoscopy Procedure Pre Assessment RN  [phone] option 4  "

## 2024-07-01 NOTE — TELEPHONE ENCOUNTER
Second call attempt to complete pre assessment.     No answer.  Left message to return call to [phone] #[phone] by next business day prior to 4PM or procedure will be sent to cancel.     Callback required communication sent via [employer].    Pre-op needed? N/A    Monse Devine RN  Endoscopy Procedure Pre Assessment

## 2024-07-03 ENCOUNTER — TELEPHONE (OUTPATIENT)
Dept: GASTROENTEROLOGY | Facility: CLINIC | Age: 59
End: 2024-07-03
Payer: COMMERCIAL

## 2024-07-03 DIAGNOSIS — F32.A DEPRESSION, UNSPECIFIED DEPRESSION TYPE: ICD-10-CM

## 2024-07-03 DIAGNOSIS — E66.811 CLASS 1 OBESITY WITHOUT SERIOUS COMORBIDITY WITH BODY MASS INDEX (BMI) OF 30.0 TO 30.9 IN ADULT, UNSPECIFIED OBESITY TYPE: Primary | ICD-10-CM

## 2024-07-03 RX ORDER — BUPROPION HYDROCHLORIDE 150 MG/1
150 TABLET ORAL EVERY MORNING
Qty: 30 TABLET | Refills: 3 | Status: SHIPPED | OUTPATIENT
Start: 2024-07-03 | End: 2024-09-23

## 2024-07-03 NOTE — TELEPHONE ENCOUNTER
Caller: No call    Reason for Reschedule/Cancellation   (please be detailed, any staff messages or encounters to note?): Cancellation policy - pre-assessment call not completed.      Prior to reschedule please review:  Ordering Provider: Sera Turner MD   Sedation Determined: CS  Does patient have any ASC Exclusions, please identify?: No      Notes on Cancelled Procedure:  Procedure: Lower Endoscopy [Colonoscopy]   Date: 7/9/2024  Location: [facility]; [address] [place] [address] [place] [address]   Surgeon: Osmany      Rescheduled: No, sent MyChart and CTL.        Did you cancel or rescheduled an EUS procedure? No.     CASE IN DEPOT.

## 2024-07-03 NOTE — TELEPHONE ENCOUNTER
No return call received.   Pre assessment was not completed for upcoming scheduled procedure.     Staff message sent to endoscopy scheduling to cancel procedure per policy.       Luh Barahona RN   Endoscopy Procedure Pre Assessment

## 2024-07-03 NOTE — TELEPHONE ENCOUNTER
----- Message from Luh COATES sent at 7/3/2024  7:57 AM CDT -----  Regarding:  7/9 Cancel request- pre assessment NOT completed  Pre assessment was not completed for upcoming Colonoscopy scheduled on 7/9/24 at [facility].    Please cancel per policy.       Thank you,     Luh Barahona RN  Endoscopy Procedure Pre Assessment RN  [phone] option 4

## 2024-08-02 ENCOUNTER — TELEPHONE (OUTPATIENT)
Dept: ENDOCRINOLOGY | Facility: CLINIC | Age: 59
End: 2024-08-02
Payer: COMMERCIAL

## 2024-08-02 NOTE — TELEPHONE ENCOUNTER
Left Voicemail (1st Attempt) and Sent Mychart (1st Attempt) for the patient to call back and schedule the following:    Appointment type: [facility]  Provider: Nia Workman  Return date: Reschedule 12/11/24 appt as provider no longer available  Specialty phone number: [phone]  Additional appointment(s) needed: n/a  Additonal Notes: n/a

## 2024-08-22 ENCOUNTER — TELEPHONE (OUTPATIENT)
Dept: ENDOCRINOLOGY | Facility: CLINIC | Age: 59
End: 2024-08-22
Payer: COMMERCIAL

## 2024-08-22 NOTE — TELEPHONE ENCOUNTER
Left Voicemail (2nd Attempt) and Sent Mychart (2nd Attempt) for the patient to call back and schedule the following:    Appointment type: [facility]   Provider: Nia Workman PA-C  Return date: 12/11/2024  Specialty phone number: [phone]  Additional appointment(s) needed: n/a  Additonal Notes: Provider unavailable, pt needs to reschedule

## 2024-09-09 ENCOUNTER — VIRTUAL VISIT (OUTPATIENT)
Dept: GASTROENTEROLOGY | Facility: CLINIC | Age: 59
End: 2024-09-09
Attending: PHYSICIAN ASSISTANT
Payer: COMMERCIAL

## 2024-09-09 DIAGNOSIS — K59.09 CHRONIC CONSTIPATION: Primary | ICD-10-CM

## 2024-09-09 DIAGNOSIS — R19.8 ABNORMAL DEFECATION: ICD-10-CM

## 2024-09-09 DIAGNOSIS — N81.9 FEMALE GENITAL PROLAPSE, UNSPECIFIED TYPE: ICD-10-CM

## 2024-09-09 PROCEDURE — 99214 OFFICE O/P EST MOD 30 MIN: CPT | Mod: 95 | Performed by: PHYSICIAN ASSISTANT

## 2024-09-09 NOTE — NURSING NOTE
Current patient location: [facility] [place] [address]    Is the patient currently in the state of [place]? YES    Visit mode:VIDEO    If the visit is dropped, the patient can be reconnected by: VIDEO VISIT: Text to cell phone:   Telephone Information:   Mobile [phone]       Will anyone else be joining the visit? NO  (If patient encounters technical issues they should call [phone]    How would you like to obtain your AVS? MyChart    Are changes needed to the allergy or medication list? No    Are refills needed on medications prescribed by this physician? NO    Rooming Documentation:  Not applicable      Reason for visit: RECHECK    Trish COVINGTONF

## 2024-09-09 NOTE — PROGRESS NOTES
"    GASTROENTEROLOGY Follow-up VIDEO VISIT    CC/REFERRING MD:    No Ref-Primary, Physician  Dalia Ruano    REASON FOR CONSULTATION:   Dalia Ruano for   Chief Complaint   Patient presents with    RECHECK       HISTORY OF PRESENT ILLNESS:    Margo Chris is a 59 year old female who is being evaluated via a billable video visit for follow-up.  To review, this patient initially met with my partner, Dalia Ruano PA-C, about 5 months ago for concerns about worsening constipation.  She describes a longstanding history of chronic constipation, dating back to childhood.  She had described going upwards of 1 to 2 weeks in between adequate stools.  She occasionally gets some small-volume stool coming out in between, but things have to get pretty backed up before she will have a full stool.  This problem has worsened over the past year, in conjunction with a general feeling of things being \"off\", having difficulty losing weight despite significant efforts through the diet, exercise, and work with the weight management program.  She had celiac serologies checked at last visit, which were normal.  Colonoscopy was recommended as she had not had one before, but she unfortunately had to cancel as she was a longer able to get a ride from the appointment.  She notes that she previously used MiraLAX many years ago but this was ineffective.  She is not currently using any medications for her bowel regimen.    Surgical history pertinent for vaginal hysterectomy.  She does endorse bulging sensation within the vagina that she can feel with palpation.        I have reviewed and updated the patient's Past Medical History, Social History, Family History and Medication List.    Exam:    General appearance:  Healthy appearing adult, in no acute distress  Eyes:  Sclera anicteric, Pupils round and reactive to light  Ears, nose, mouth and throat:  No obvious external lesions of ears and nose.  Hearing intact  Neck:  Symmetric, No obvious " external lesions  Respiratory:  Normal respiration, no use of accessory muscles   MSK:  No visual upper extremity, neck or facial muscle atrophy  ABD:  No visual abdominal distention, no audible borborygmi  Skin:  No rashes or jaundice   Psychiatric:  Oriented to person, place and time, Appropriate mood and affect.   Neurologic:  Peripheral muscle function and dexterity appear to be intact      PERTINENT STUDIES have been reviewed.    ASSESSMENT/PLAN:    Margo Chris is a 59 year old female who presents for follow up of chronic constipation.  We spent some time reviewing her symptoms and how things have progressed over the past year.  I suspect she likely has a combination of chronic slow transit constipation and perhaps now some pelvic floor dysfunction, possibly with cystocele and/or rectocele.  We discussed some ways to go about investigating this further.  1 option would be to refer for full pelvic floor testing, which would include anorectal manometry, balloon expulsion, and MR defecography.  The other option would be to have her consult with one of our urogynecology us for a more detailed exam and work up from there.  She preferred the latter option, referral was placed today.  I will follow-up with her after that visit to discuss further interventions we may want to consider with treating her slow transit constipation.  For now, she will continue with adequate hydration, high-fiber diet, and regular exercise.    1. Chronic constipation  - Adult Uro/Gyn  Referral; Future    2. Abnormal defecation  - Adult Uro/Gyn  Referral; Future    3. Female genital prolapse, unspecified type  - Adult Uro/Gyn  Referral; Future      Video-Visit Details    Video Visit Time: 22 minutes    Type of service:  Video Visit    Originating Location (pt. Location): Home    Distant Location (provider location):  Off-site    Platform used for Video Visit: Clinton    A total of 30 minutes was spent with  reviewing the chart, discussing with the patient, documentation and coordination of care on 9/9/2024.    Eliezer Rahman PA-C  Division of Gastroenterology, Hepatology, and Nutrition  [facility]  [phone]

## 2024-09-23 ENCOUNTER — VIRTUAL VISIT (OUTPATIENT)
Dept: ENDOCRINOLOGY | Facility: CLINIC | Age: 59
End: 2024-09-23
Payer: COMMERCIAL

## 2024-09-23 VITALS — WEIGHT: 127 LBS | BODY MASS INDEX: 23.98 KG/M2 | HEIGHT: 61 IN

## 2024-09-23 DIAGNOSIS — F32.A DEPRESSION, UNSPECIFIED DEPRESSION TYPE: ICD-10-CM

## 2024-09-23 PROCEDURE — 99213 OFFICE O/P EST LOW 20 MIN: CPT | Mod: 95 | Performed by: INTERNAL MEDICINE

## 2024-09-23 RX ORDER — BUPROPION HYDROCHLORIDE 300 MG/1
300 TABLET ORAL EVERY MORNING
Qty: 90 TABLET | Refills: 5 | Status: SHIPPED | OUTPATIENT
Start: 2024-09-23

## 2024-09-23 ASSESSMENT — PAIN SCALES - GENERAL: PAINLEVEL: NO PAIN (0)

## 2024-09-23 NOTE — LETTER
"2024       RE: Margo Chris  [address] [place] [address]     Dear Colleague,    Thank you for referring your patient, Margo Chris, to the [facility] WEIGHT MANAGEMENT CLINIC [place] at [facility]. Please see a copy of my visit note below.    Virtual Visit Details    Joined the call at 2024, 9:45:10 am.  Left the call at 2024, 10:05:33 am.    Originating Location (pt. Location): Home    Distant Location (provider location):  Off-site  Platform used for Video Visit: [facility] Medical Weight Management Note     Margo Chris  MRN:  [id number]  :  1965  RAUL:  2024    Dear Colleagues,    I had the pleasure of seeing your patient Margo Chris.  She is a 59 year old female who I am continuing to see for treatment of obesity related to:       No data to display              CURRENT WEIGHT:   127 lbs 0 oz    Wt Readings from Last 4 Encounters:   24 57.6 kg (127 lb)   24 57.6 kg (127 lb)   24 58.5 kg (129 lb)   23 54.4 kg (120 lb)     Height:  5' .5\"  Body Mass Index:  Body mass index is 24.39 kg/m .  Vitals:  B/P: Data Unavailable, P: Data Unavailable    Initial consult weight was 163 on 12/3/10.  Weight change since last seen on 2024 is down 0 pounds.   Total loss is 36 pounds.    INTERVAL HISTORY:  Feels after all this time that phentermine has stopped being very effective for her. Started in May on Bupropion and feels that is helpful. Did not start naltrexone with because of Gilbert's syndrome.         No data to display              MEDICATIONS:   Current Outpatient Medications   Medication Sig Dispense Refill     bisacodyl (DULCOLAX) 5 MG EC tablet Two days prior to exam take two (2) tablets at 4pm. One day prior to exam take two (2) tablets at 4pm 4 tablet 0     buPROPion (WELLBUTRIN XL) 150 MG 24 hr tablet Take 1 tablet (150 mg) by mouth every morning " 30 tablet 3     Multiple Vitamins-Minerals (MULTIVITAMIN ADULT) CHEW Take 2 chew tab by mouth daily       naltrexone-bupropion (CONTRAVE) 8-90 MG per 12 hr tablet Wk 1: 1 tab in AM daily, Wk 2: 1 tab in AM and 1 tab PM, Wk 3: 2 tabs in AM and 1 tab in PM, Wk 4 and on: 2 tabs in AM and 2 tabs PM. 120 tablet 0     phentermine (ADIPEX-P) 15 MG capsule Take 2 capsules (30 mg) by mouth every morning 60 capsule 5     polyethylene glycol (GOLYTELY) 236 g suspension Two days before procedure at 5PM fill first container with water. Mix and drink an 8 oz glass every 10-15 minutes until HALF of the container is gone. Place the remainder in the refrigerator. One day before procedure at 5PM drink second half of bowel prep. Drink an 8 oz glass every 10-15 minutes until it is gone. Day of procedure 6 hours before arrival time fill the 2nd container with water. Mix and drink an 8 oz glass every 10-15 minutes until HALF of the container is gone. Discard the remaining solution. 8000 mL 0     No current facility-administered medications for this visit.         9/18/2024     8:57 AM   Weight Loss Medication History Reviewed With Patient   Which weight loss medications are you currently taking on a regular basis? Bupropion   Are you having any side effects from the weight loss medication that we have prescribed you? No     ASSESSMENT:   Fine to continue bupropion as long as that is helping. Consider re-trying phentermine in future if needed.    FOLLOW-UP:    6 months.    Sincerely,  Alvaro Blackmon MD      Again, thank you for allowing me to participate in the care of your patient.      Sincerely,    Alvaro Blackmon MD

## 2024-09-23 NOTE — PROGRESS NOTES
Virtual Visit Details    Joined the call at 9/23/2024, 9:45:10 am.  Left the call at 9/23/2024, 10:05:33 am.    Originating Location (pt. Location): Home    Distant Location (provider location):  Off-site  Platform used for Video Visit: Clinton

## 2024-09-23 NOTE — NURSING NOTE
Current patient location: [facility] [place] [address]    Is the patient currently in the state of [place]? YES    Visit mode:VIDEO    If the visit is dropped, the patient can be reconnected by: VIDEO VISIT: Text to cell phone:   Telephone Information:   Mobile [phone]       Will anyone else be joining the visit? NO  (If patient encounters technical issues they should call [phone]    How would you like to obtain your AVS? MyChart    Are changes needed to the allergy or medication list? No    Are refills needed on medications prescribed by this physician? YES Pt would like to discuss with JOANIE.    Rooming Documentation:  Questionnaire(s) completed    Reason for visit: SEAN SOLIS

## 2024-09-23 NOTE — PROGRESS NOTES
"    Return Medical Weight Management Note     Margo Chris  MRN:  [id number]  :  1965  RAUL:  2024    Dear Colleagues,    I had the pleasure of seeing your patient Margo Chris.  She is a 59 year old female who I am continuing to see for treatment of obesity related to:       No data to display              CURRENT WEIGHT:   127 lbs 0 oz    Wt Readings from Last 4 Encounters:   24 57.6 kg (127 lb)   24 57.6 kg (127 lb)   24 58.5 kg (129 lb)   23 54.4 kg (120 lb)     Height:  5' .5\"  Body Mass Index:  Body mass index is 24.39 kg/m .  Vitals:  B/P: Data Unavailable, P: Data Unavailable    Initial consult weight was 163 on 12/3/10.  Weight change since last seen on 2024 is down 0 pounds.   Total loss is 36 pounds.    INTERVAL HISTORY:  Feels after all this time that phentermine has stopped being very effective for her. Started in May on Bupropion and feels that is helpful. Did not start naltrexone with because of Gilbert's syndrome.         No data to display              MEDICATIONS:   Current Outpatient Medications   Medication Sig Dispense Refill    bisacodyl (DULCOLAX) 5 MG EC tablet Two days prior to exam take two (2) tablets at 4pm. One day prior to exam take two (2) tablets at 4pm 4 tablet 0    buPROPion (WELLBUTRIN XL) 150 MG 24 hr tablet Take 1 tablet (150 mg) by mouth every morning 30 tablet 3    Multiple Vitamins-Minerals (MULTIVITAMIN ADULT) CHEW Take 2 chew tab by mouth daily      naltrexone-bupropion (CONTRAVE) 8-90 MG per 12 hr tablet Wk 1: 1 tab in AM daily, Wk 2: 1 tab in AM and 1 tab PM, Wk 3: 2 tabs in AM and 1 tab in PM, Wk 4 and on: 2 tabs in AM and 2 tabs PM. 120 tablet 0    phentermine (ADIPEX-P) 15 MG capsule Take 2 capsules (30 mg) by mouth every morning 60 capsule 5    polyethylene glycol (GOLYTELY) 236 g suspension Two days before procedure at 5PM fill first container with water. Mix and drink an 8 oz glass every 10-15 minutes until HALF " of the container is gone. Place the remainder in the refrigerator. One day before procedure at 5PM drink second half of bowel prep. Drink an 8 oz glass every 10-15 minutes until it is gone. Day of procedure 6 hours before arrival time fill the 2nd container with water. Mix and drink an 8 oz glass every 10-15 minutes until HALF of the container is gone. Discard the remaining solution. 8000 mL 0     No current facility-administered medications for this visit.         9/18/2024     8:57 AM   Weight Loss Medication History Reviewed With Patient   Which weight loss medications are you currently taking on a regular basis? Bupropion   Are you having any side effects from the weight loss medication that we have prescribed you? No     ASSESSMENT:   Fine to continue bupropion as long as that is helping. Consider re-trying phentermine in future if needed.    FOLLOW-UP:    6 months.    Sincerely,  Alvaro Blackmon MD

## 2024-10-09 ENCOUNTER — OFFICE VISIT (OUTPATIENT)
Dept: DERMATOLOGY | Facility: CLINIC | Age: 59
End: 2024-10-09
Attending: OBSTETRICS & GYNECOLOGY
Payer: COMMERCIAL

## 2024-10-09 DIAGNOSIS — Z80.8 FAMILY HISTORY OF MALIGNANT MELANOMA: ICD-10-CM

## 2024-10-09 DIAGNOSIS — D22.9 MULTIPLE NEVI: ICD-10-CM

## 2024-10-09 DIAGNOSIS — L82.1 SEBORRHEIC KERATOSIS: ICD-10-CM

## 2024-10-09 DIAGNOSIS — Z12.83 SKIN CANCER SCREENING: Primary | ICD-10-CM

## 2024-10-09 DIAGNOSIS — L57.0 ACTINIC KERATOSIS: ICD-10-CM

## 2024-10-09 DIAGNOSIS — L81.4 LENTIGO: ICD-10-CM

## 2024-10-09 PROCEDURE — 99203 OFFICE O/P NEW LOW 30 MIN: CPT | Performed by: NURSE PRACTITIONER

## 2024-10-09 ASSESSMENT — PAIN SCALES - GENERAL: PAINLEVEL: NO PAIN (0)

## 2024-10-09 NOTE — PROGRESS NOTES
[facility] Dermatology Note  Encounter Date: Oct 9, 2024  Office Visit     Reviewed patients past medical history and pertinent chart review prior to patients visit today.     Dermatology Problem List:  Last skin check: 10/09/2024  Actinic keratosis, right eyebrow, will return for cryo in 1 month after sons wedding    Personal Hx: no personal history of skin cancer  Family Hx: Positive for melanoma, father  _________________________________________    Assessment & Plan:     # Actinic keratosis, right eyebrow  Actinic keratoses are pre-cancerous skin growths caused by sun exposure. Treatment is recommended and medically indicated. Treatment options discussed including cryotherapy versus topicals.  Patient's son is getting  in a few weeks.  She wishes to return for cryotherapy for treatment in roughly 1 month.  She will schedule this appointment today.    # Family history of melanoma, father  # Multiple nevi, trunk and extremities  # Solar lentigines  - Continued observation recommended.   - Nevi demonstrate no concerning features on dermoscopy. We discussed the importance of self exams at home.   - ABCDEs: Counseled ABCDEs of melanoma: Asymmetry, Border (irregularity), Color (not uniform, changes in color), Diameter (greater than 6 mm which is about the size of a pencil eraser), and Evolving (any changes in preexisting moles).  - Sun protection: Counseled SPF 30+ sunscreen, UPF clothing, sun avoidance, tanning bed avoidance.    # Seborrheic keratoses  - We discussed the benign nature of the skin lesions. No treatment required. Continued observation recommended. Follow up with any concerns.      Follow-up:  1 year(s) for follow up full body skin exam, prn for new or changing lesions or new concerns    Kalpana Flores CNP  Dermatology   __________________________________________    CC: Skin Check ([facility])    HPI:  Ms. Margo Chris is a(n) 59 year old female who presents today as a new  patient for a full body skin cancer screening. Patient has concerns today about a persistent asymptomatic scaly lesion at the right eyebrow. She also has a skin lesion at the left popliteal fossa that has grown in size. These do not itch, bleed, or cause pain. She is not diligent with photoprotection.     Her father has a history of melanoma.    Patient is otherwise feeling well, without additional skin concerns.     Physical Exam:  Vitals: LMP 01/26/2018 (Approximate)   SKIN: Total skin excluding the genitalia areas was performed. The exam included the head/face, neck, both arms, chest, back, abdomen, both legs, digits, mons pubis, buttock and nails.   -right eyebrow, pink macule(s) with overlying adherent scale consistent with an actinic keratosis   -multiple tan/brown flat round macules and raised papules scattered throughout trunk, extremities and head. No worrisome features for malignancy noted on examination.  -scattered tan, homogenous macules scattered on sun exposed areas of trunk, extremities and face.   -scattered waxy, stuck on tan/brown papules and patches on the trunk, extremities including the left popliteal fossa    - No other lesions of concern on areas examined.     Medications:  Current Outpatient Medications   Medication Sig Dispense Refill    buPROPion (WELLBUTRIN XL) 300 MG 24 hr tablet Take 1 tablet (300 mg) by mouth every morning. 90 tablet 5    Multiple Vitamins-Minerals (MULTIVITAMIN ADULT) CHEW Take 2 chew tab by mouth daily      bisacodyl (DULCOLAX) 5 MG EC tablet Two days prior to exam take two (2) tablets at 4pm. One day prior to exam take two (2) tablets at 4pm 4 tablet 0    naltrexone-bupropion (CONTRAVE) 8-90 MG per 12 hr tablet Wk 1: 1 tab in AM daily, Wk 2: 1 tab in AM and 1 tab PM, Wk 3: 2 tabs in AM and 1 tab in PM, Wk 4 and on: 2 tabs in AM and 2 tabs PM. 120 tablet 0    phentermine (ADIPEX-P) 15 MG capsule Take 2 capsules (30 mg) by mouth every morning 60 capsule 5     polyethylene glycol (GOLYTELY) 236 g suspension Two days before procedure at 5PM fill first container with water. Mix and drink an 8 oz glass every 10-15 minutes until HALF of the container is gone. Place the remainder in the refrigerator. One day before procedure at 5PM drink second half of bowel prep. Drink an 8 oz glass every 10-15 minutes until it is gone. Day of procedure 6 hours before arrival time fill the 2nd container with water. Mix and drink an 8 oz glass every 10-15 minutes until HALF of the container is gone. Discard the remaining solution. 8000 mL 0     No current facility-administered medications for this visit.      Past Medical History:   Patient Active Problem List   Diagnosis    Chronic fatigue syndrome    Hyperlipidemia    Class 1 obesity without serious comorbidity with body mass index (BMI) of 30.0 to 30.9 in adult    S/P vaginal hysterectomy    Abnormal weight gain    Family history of diabetes mellitus    Gilbert syndrome    Subclinical hypothyroidism    Urticaria     Past Medical History:   Diagnosis Date    Chronic depression     Gilbert's syndrome     Obesity, unspecified     Other and unspecified hyperlipidemia      CC Sera Turner MD  [address] [address] on close of this encounter.

## 2024-10-09 NOTE — LETTER
10/9/2024      Margo Chris  [address] [place] [address]      Dear Colleague,    Thank you for referring your patient, Margo Chris, to the [facility]. Please see a copy of my visit note below.    [facility] Dermatology Note  Encounter Date: Oct 9, 2024  Office Visit     Reviewed patients past medical history and pertinent chart review prior to patients visit today.     Dermatology Problem List:  Last skin check: 10/09/2024  Actinic keratosis, right eyebrow, will return for cryo in 1 month after sons wedding    Personal Hx: no personal history of skin cancer  Family Hx: Positive for melanoma, father  _________________________________________    Assessment & Plan:     # Actinic keratosis, right eyebrow  Actinic keratoses are pre-cancerous skin growths caused by sun exposure. Treatment is recommended and medically indicated. Treatment options discussed including cryotherapy versus topicals.  Patient's son is getting  in a few weeks.  She wishes to return for cryotherapy for treatment in roughly 1 month.  She will schedule this appointment today.    # Family history of melanoma, father  # Multiple nevi, trunk and extremities  # Solar lentigines  - Continued observation recommended.   - Nevi demonstrate no concerning features on dermoscopy. We discussed the importance of self exams at home.   - ABCDEs: Counseled ABCDEs of melanoma: Asymmetry, Border (irregularity), Color (not uniform, changes in color), Diameter (greater than 6 mm which is about the size of a pencil eraser), and Evolving (any changes in preexisting moles).  - Sun protection: Counseled SPF 30+ sunscreen, UPF clothing, sun avoidance, tanning bed avoidance.    # Seborrheic keratoses  - We discussed the benign nature of the skin lesions. No treatment required. Continued observation recommended. Follow up with any concerns.      Follow-up:  1 year(s) for follow up full body  skin exam, prn for new or changing lesions or new concerns    Kalpana Flores, MIRNA  Dermatology   __________________________________________    CC: Skin Check ([facility])    HPI:  Ms. Margo Chris is a(n) 59 year old female who presents today as a new patient for a full body skin cancer screening. Patient has concerns today about a persistent asymptomatic scaly lesion at the right eyebrow. She also has a skin lesion at the left popliteal fossa that has grown in size. These do not itch, bleed, or cause pain. She is not diligent with photoprotection.     Her father has a history of melanoma.    Patient is otherwise feeling well, without additional skin concerns.     Physical Exam:  Vitals: LMP 01/26/2018 (Approximate)   SKIN: Total skin excluding the genitalia areas was performed. The exam included the head/face, neck, both arms, chest, back, abdomen, both legs, digits, mons pubis, buttock and nails.   -right eyebrow, pink macule(s) with overlying adherent scale consistent with an actinic keratosis   -multiple tan/brown flat round macules and raised papules scattered throughout trunk, extremities and head. No worrisome features for malignancy noted on examination.  -scattered tan, homogenous macules scattered on sun exposed areas of trunk, extremities and face.   -scattered waxy, stuck on tan/brown papules and patches on the trunk, extremities including the left popliteal fossa    - No other lesions of concern on areas examined.     Medications:  Current Outpatient Medications   Medication Sig Dispense Refill     buPROPion (WELLBUTRIN XL) 300 MG 24 hr tablet Take 1 tablet (300 mg) by mouth every morning. 90 tablet 5     Multiple Vitamins-Minerals (MULTIVITAMIN ADULT) CHEW Take 2 chew tab by mouth daily       bisacodyl (DULCOLAX) 5 MG EC tablet Two days prior to exam take two (2) tablets at 4pm. One day prior to exam take two (2) tablets at 4pm 4 tablet 0     naltrexone-bupropion (CONTRAVE) 8-90 MG per 12 hr tablet Wk 1:  1 tab in AM daily, Wk 2: 1 tab in AM and 1 tab PM, Wk 3: 2 tabs in AM and 1 tab in PM, Wk 4 and on: 2 tabs in AM and 2 tabs PM. 120 tablet 0     phentermine (ADIPEX-P) 15 MG capsule Take 2 capsules (30 mg) by mouth every morning 60 capsule 5     polyethylene glycol (GOLYTELY) 236 g suspension Two days before procedure at 5PM fill first container with water. Mix and drink an 8 oz glass every 10-15 minutes until HALF of the container is gone. Place the remainder in the refrigerator. One day before procedure at 5PM drink second half of bowel prep. Drink an 8 oz glass every 10-15 minutes until it is gone. Day of procedure 6 hours before arrival time fill the 2nd container with water. Mix and drink an 8 oz glass every 10-15 minutes until HALF of the container is gone. Discard the remaining solution. 8000 mL 0     No current facility-administered medications for this visit.      Past Medical History:   Patient Active Problem List   Diagnosis     Chronic fatigue syndrome     Hyperlipidemia     Class 1 obesity without serious comorbidity with body mass index (BMI) of 30.0 to 30.9 in adult     S/P vaginal hysterectomy     Abnormal weight gain     Family history of diabetes mellitus     Gilbert syndrome     Subclinical hypothyroidism     Urticaria     Past Medical History:   Diagnosis Date     Chronic depression      Gilbert's syndrome      Obesity, unspecified      Other and unspecified hyperlipidemia      CC Sera Turner MD  [address] [address] on close of this encounter.      Again, thank you for allowing me to participate in the care of your patient.        Sincerely,        KEVIN Rivas CNP

## 2024-10-09 NOTE — PATIENT INSTRUCTIONS
Proper skin care from [facility]:    -Eliminate harsh soaps as they strip the natural oils from the skin, often resulting in dry itchy skin ( i.e. Dial, Zest, Jamaican Spring)  -Use mild soaps such as Cetaphil or Dove Sensitive Skin in the shower. You do not need to use soap on arms, legs, and trunk every time you shower unless visibly soiled.   -Avoid hot or cold showers.  -After showering, lightly dry off and apply moisturizing within 2-3 minutes. This will help trap moisture in the skin.   -Aggressive use of a moisturizer at least 1-2 times a day to the entire body (including -Vanicream, Cetaphil, Aquaphor or Cerave) and moisturize hands after every washing.  -We recommend using moisturizers that come in a tub that needs to be scooped out, not a pump. This has more of an oil base. It will hold moisture in your skin much better than a water base moisturizer. The above recommended are non-pore clogging.      Wear a sunscreen with at least SPF 30 on your face, ears, neck and V of the chest daily. Wear sunscreen on other areas of the body if those areas are exposed to the sun throughout the day. Sunscreens can contain physical and/or chemical blockers. Physical blockers are less likely to clog pores, these include zinc oxide and titanium dioxide. Reapply every two hour and after swimming.     Sunscreen examples: https://www.ewg.org/sunscreen/    UV radiation  UVA radiation remains constant throughout the day and throughout the year. It is a longer wavelength than UVB and therefore penetrates deeper into the skin leading to immediate and delayed tanning, photoaging, and skin cancer. 70-80% of UVA and UVB radiation occurs between the hours of 10am-2pm.  UVB radiation  UVB radiation causes the most harmful effects and is more significant during the summer months. However, snow and ice can reflect UVB radiation leading to skin damage during the winter months as well. UVB radiation is responsible for tanning,  burning, inflammation, delayed erythema (pinkness), pigmentation (brown spots), and skin cancer.     I recommend self monthly full body exams and yearly full body exams with a dermatology provider. If you develop a new or changing lesion please follow up for examination. Most skin cancers are pink and scaly or pink and pearly. However, we do see blue/brown/black skin cancers.  Consider the ABCDEs of melanoma when giving yourself your monthly full body exam ( don't forget the groin, buttocks, feet, toes, etc). A-asymmetry, B-borders, C-color, D-diameter, E-elevation or evolving. If you see any of these changes please follow up in clinic. If you cannot see your back I recommend purchasing a hand held mirror to use with a larger wall mirror.       Checking for Skin Cancer  You can find cancer early by checking your skin each month. There are 3 kinds of skin cancer. They are melanoma, basal cell carcinoma, and squamous cell carcinoma. Doing monthly skin checks is the best way to find new marks or skin changes. Follow the instructions below for checking your skin.   The ABCDEs of checking moles for melanoma   Check your moles or growths for signs of melanoma using ABCDE:   Asymmetry: the sides of the mole or growth don t match  Border: the edges are ragged, notched, or blurred  Color: the color within the mole or growth varies  Diameter: the mole or growth is larger than 6 mm (size of a pencil eraser)  Evolving: the size, shape, or color of the mole or growth is changing (evolving is not shown in the images below)    Checking for other types of skin cancer  Basal cell carcinoma or squamous cell carcinoma have symptoms such as:     A spot or mole that looks different from all other marks on your skin  Changes in how an area feels, such as itching, tenderness, or pain  Changes in the skin's surface, such as oozing, bleeding, or scaliness  A sore that does not heal  New swelling or redness beyond the border of a  mole    Who s at risk?  Anyone can get skin cancer. But you are at greater risk if you have:   Fair skin, light-colored hair, or light-colored eyes  Many moles or abnormal moles on your skin  A history of sunburns from sunlight or tanning beds  A family history of skin cancer  A history of exposure to radiation or chemicals  A weakened immune system  If you have had skin cancer in the past, you are at risk for recurring skin cancer.   How to check your skin  Do your monthly skin checkups in front of a full-length mirror. Check all parts of your body, including your:   Head (ears, face, neck, and scalp)  Torso (front, back, and sides)  Arms (tops, undersides, upper, and lower armpits)  Hands (palms, backs, and fingers, including under the nails)  Buttocks and genitals  Legs (front, back, and sides)  Feet (tops, soles, toes, including under the nails, and between toes)  If you have a lot of moles, take digital photos of them each month. Make sure to take photos both up close and from a distance. These can help you see if any moles change over time.   Most skin changes are not cancer. But if you see any changes in your skin, call your doctor right away. Only he or she can diagnose a problem. If you have skin cancer, seeing your doctor can be the first step toward getting the treatment that could save your life.   [employer] last reviewed this educational content on 4/1/2019 2000-2020 [employer]. [address] [place] [address], [place] [address]. All rights reserved. This information is not intended as a substitute for professional medical care. Always follow your healthcare professional's instructions.       When should I call my doctor?  If you are worsening or not improving, please, contact us or seek urgent care as noted below.     Who should I call with questions (adults)?    [facility] [phone]  For urgent needs outside of business hours call the [facility] at  [phone] and ask for the dermatology resident on call to be paged  If this is a medical emergency and you are unable to reach an ER, Call 911      If you need a prescription refill, please contact your pharmacy. Refills are approved or denied by our Physicians during normal business hours, Monday through Fridays  Per office policy, refills will not be granted if you have not been seen within the past year (or sooner depending on your child's condition)

## 2024-10-11 NOTE — TELEPHONE ENCOUNTER
MEDICAL RECORDS REQUEST   [facility]  Urology Clinic  [address] [place] [address]  PHONE: [phone]  Fax: [phone]        FUTURE VISIT INFORMATION                                                   Margo Chris, : 1965 scheduled for future visit at [facility]    APPOINTMENT INFORMATION:  Date: 2024  Provider:  Dr. Flaquita Pierce  Reason for Visit/Diagnosis: Pelvic Organ Prolapse, Urinary Retention, Chronic constipation refractory to laxatives, describes feeling prolapsing tissue in vagina, some description of pelvic floor dysfunction/rectal outlet obstruction with defecation    REFERRAL INFORMATION:  Referring provider: LUCAS Licea  Specialty: Gastroenterology  Referring providers clinic: [facility]    RECORDS REQUESTED FOR VISIT                                                     NOTES  STATUS/DETAILS   OFFICE NOTE from referring provider  yes  [facility] - M24 - GI OV with LUCAS Licea   OFFICE NOTE from other specialist  no   DISCHARGE SUMMARY from hospital  no   DISCHARGE REPORT from the ER  no   OPERATIVE REPORT  yes  [facility]:  18 - OP Note for LAPAROSCOPIC ASSISTED VAGINAL HYSTERECTOMY BILATERAL SALPINGECTOMY with Dr. Tim   MEDICATION LIST  yes   FEMALE PELVIC PAIN     OB/GYN VISIT NOTES  yes  [facility]:  24 - OBGYN OV with Dr. Turner   GI/COLORECTAL VISIT NOTES  yes  [facility] - M/15/24 - GI OV with LUCAS Brewer     PRE-VISIT CHECKLIST      Joint diagnostic appointment coordinated correctly          (ensure right order & amount of time) Yes   RECORD COLLECTION COMPLETE Yes

## 2024-10-29 ENCOUNTER — TELEPHONE (OUTPATIENT)
Dept: ENDOCRINOLOGY | Facility: CLINIC | Age: 59
End: 2024-10-29
Payer: COMMERCIAL

## 2024-10-29 NOTE — TELEPHONE ENCOUNTER
Left Voicemail (1st Attempt) for the patient to call back and schedule the following:    Appointment type: return weight management  Provider: Lorri  Return date: around April 8, 2025  Specialty phone number: [phone]  Additional appointment(s) needed: n/a  Additional Notes: n/a    Sherrill Hubbard on 10/29/2024 at 1:37 PM

## 2024-11-13 ENCOUNTER — OFFICE VISIT (OUTPATIENT)
Dept: DERMATOLOGY | Facility: CLINIC | Age: 59
End: 2024-11-13
Payer: COMMERCIAL

## 2024-11-13 DIAGNOSIS — L57.0 AK (ACTINIC KERATOSIS): Primary | ICD-10-CM

## 2024-11-13 NOTE — LETTER
11/13/2024      Margo Chris  [address] [place] [address]      Dear Colleague,    Thank you for referring your patient, Margo Chris, to the [facility]. Please see a copy of my visit note below.    [facility] Dermatology Note  Encounter Date: Nov 13, 2024  Office Visit     Reviewed patients past medical history and pertinent chart review prior to patients visit today.     Dermatology Problem List:  Last skin check: 10/09/2024  Actinic keratosis, right eyebrow, cryo 11/13/2024     Personal Hx: no personal history of skin cancer  Family Hx: Positive for melanoma, father  _________________________________________    Assessment & Plan:     # Actinic keratosis, right eyebrow  Premalignant nature discussed with patient. Treatment options discussed with patient today including no treatment, topical treatment, and cryotherapy. Patient elects to treat visible lesions today with cryotherapy. After verbal consent and discussion of risks and benefits including but no limited to dyspigmentation/scar, blister, and pain. A total of 1 actinic keratoses were treated with 1-2mm freeze border for 2 cycle with liquid nitrogen. Post cryotherapy instructions were provided.     Follow-up:  1 year(s) for follow up full body skin exam, prn for new or changing lesions or new concerns    Kalpana Flores, MIRNA  Dermatology   __________________________________________    CC: Actinic Keratosis (Cryo on Ak - above right eyebrow)    HPI:  Ms. Margo Chris is a(n) 59 year old female who presents today as a return patient for treatment of actinic keratosis on the right eyebrow.  I last saw her for her skin check on 10/9/2024 but her son was getting  recently after so she deferred treatment until today.  No changes in the lesion.    Her father has a history of melanoma.    Patient is otherwise feeling well, without additional skin concerns.     Physical  Exam:  Vitals: LMP 01/26/2018 (Approximate)   SKIN: Lesion on the right eyebrow  -right eyebrow, pink macule(s) with overlying adherent scale consistent with an actinic keratosis     - No other lesions of concern on areas examined.     Medications:  Current Outpatient Medications   Medication Sig Dispense Refill     buPROPion (WELLBUTRIN XL) 300 MG 24 hr tablet Take 1 tablet (300 mg) by mouth every morning. 90 tablet 5     Multiple Vitamins-Minerals (MULTIVITAMIN ADULT) CHEW Take 2 chew tab by mouth daily       bisacodyl (DULCOLAX) 5 MG EC tablet Two days prior to exam take two (2) tablets at 4pm. One day prior to exam take two (2) tablets at 4pm 4 tablet 0     naltrexone-bupropion (CONTRAVE) 8-90 MG per 12 hr tablet Wk 1: 1 tab in AM daily, Wk 2: 1 tab in AM and 1 tab PM, Wk 3: 2 tabs in AM and 1 tab in PM, Wk 4 and on: 2 tabs in AM and 2 tabs PM. 120 tablet 0     phentermine (ADIPEX-P) 15 MG capsule Take 2 capsules (30 mg) by mouth every morning 60 capsule 5     polyethylene glycol (GOLYTELY) 236 g suspension Two days before procedure at 5PM fill first container with water. Mix and drink an 8 oz glass every 10-15 minutes until HALF of the container is gone. Place the remainder in the refrigerator. One day before procedure at 5PM drink second half of bowel prep. Drink an 8 oz glass every 10-15 minutes until it is gone. Day of procedure 6 hours before arrival time fill the 2nd container with water. Mix and drink an 8 oz glass every 10-15 minutes until HALF of the container is gone. Discard the remaining solution. 8000 mL 0     No current facility-administered medications for this visit.      Past Medical History:   Patient Active Problem List   Diagnosis     Chronic fatigue syndrome     Hyperlipidemia     Class 1 obesity without serious comorbidity with body mass index (BMI) of 30.0 to 30.9 in adult     S/P vaginal hysterectomy     Abnormal weight gain     Family history of diabetes mellitus     Gilbert syndrome      Subclinical hypothyroidism     Urticaria     Past Medical History:   Diagnosis Date     Chronic depression      Gilbert's syndrome      Obesity, unspecified      Other and unspecified hyperlipidemia      CC Sera Turner MD  [address] [address] on close of this encounter.      Again, thank you for allowing me to participate in the care of your patient.        Sincerely,        KEVIN Rivas CNP

## 2024-11-13 NOTE — PATIENT INSTRUCTIONS
Proper skin care from [facility]:    -Eliminate harsh soaps as they strip the natural oils from the skin, often resulting in dry itchy skin ( i.e. Dial, Zest, [place] Spring)  -Use mild soaps such as Cetaphil or Dove Sensitive Skin in the shower. You do not need to use soap on arms, legs, and trunk every time you shower unless visibly soiled.   -Avoid hot or cold showers.  -After showering, lightly dry off and apply moisturizing within 2-3 minutes. This will help trap moisture in the skin.   -Aggressive use of a moisturizer at least 1-2 times a day to the entire body (including -Vanicream, Cetaphil, Aquaphor or Cerave) and moisturize hands after every washing.  -We recommend using moisturizers that come in a tub that needs to be scooped out, not a pump. This has more of an oil base. It will hold moisture in your skin much better than a water base moisturizer. The above recommended are non-pore clogging.      Wear a sunscreen with at least SPF 30 on your face, ears, neck and V of the chest daily. Wear sunscreen on other areas of the body if those areas are exposed to the sun throughout the day. Sunscreens can contain physical and/or chemical blockers. Physical blockers are less likely to clog pores, these include zinc oxide and titanium dioxide. Reapply every two hour and after swimming.     Sunscreen examples: https://www.ewg.org/sunscreen/    UV radiation  UVA radiation remains constant throughout the day and throughout the year. It is a longer wavelength than UVB and therefore penetrates deeper into the skin leading to immediate and delayed tanning, photoaging, and skin cancer. 70-80% of UVA and UVB radiation occurs between the hours of 10am-2pm.  UVB radiation  UVB radiation causes the most harmful effects and is more significant during the summer months. However, snow and ice can reflect UVB radiation leading to skin damage during the winter months as well. UVB radiation is responsible for tanning,  burning, inflammation, delayed erythema (pinkness), pigmentation (brown spots), and skin cancer.     I recommend self monthly full body exams and yearly full body exams with a dermatology provider. If you develop a new or changing lesion please follow up for examination. Most skin cancers are pink and scaly or pink and pearly. However, we do see blue/brown/black skin cancers.  Consider the ABCDEs of melanoma when giving yourself your monthly full body exam ( don't forget the groin, buttocks, feet, toes, etc). A-asymmetry, B-borders, C-color, D-diameter, E-elevation or evolving. If you see any of these changes please follow up in clinic. If you cannot see your back I recommend purchasing a hand held mirror to use with a larger wall mirror.       Checking for Skin Cancer  You can find cancer early by checking your skin each month. There are 3 kinds of skin cancer. They are melanoma, basal cell carcinoma, and squamous cell carcinoma. Doing monthly skin checks is the best way to find new marks or skin changes. Follow the instructions below for checking your skin.   The ABCDEs of checking moles for melanoma   Check your moles or growths for signs of melanoma using ABCDE:   Asymmetry: the sides of the mole or growth don t match  Border: the edges are ragged, notched, or blurred  Color: the color within the mole or growth varies  Diameter: the mole or growth is larger than 6 mm (size of a pencil eraser)  Evolving: the size, shape, or color of the mole or growth is changing (evolving is not shown in the images below)    Checking for other types of skin cancer  Basal cell carcinoma or squamous cell carcinoma have symptoms such as:     A spot or mole that looks different from all other marks on your skin  Changes in how an area feels, such as itching, tenderness, or pain  Changes in the skin's surface, such as oozing, bleeding, or scaliness  A sore that does not heal  New swelling or redness beyond the border of a  mole    Who s at risk?  Anyone can get skin cancer. But you are at greater risk if you have:   Fair skin, light-colored hair, or light-colored eyes  Many moles or abnormal moles on your skin  A history of sunburns from sunlight or tanning beds  A family history of skin cancer  A history of exposure to radiation or chemicals  A weakened immune system  If you have had skin cancer in the past, you are at risk for recurring skin cancer.   How to check your skin  Do your monthly skin checkups in front of a full-length mirror. Check all parts of your body, including your:   Head (ears, face, neck, and scalp)  Torso (front, back, and sides)  Arms (tops, undersides, upper, and lower armpits)  Hands (palms, backs, and fingers, including under the nails)  Buttocks and genitals  Legs (front, back, and sides)  Feet (tops, soles, toes, including under the nails, and between toes)  If you have a lot of moles, take digital photos of them each month. Make sure to take photos both up close and from a distance. These can help you see if any moles change over time.   Most skin changes are not cancer. But if you see any changes in your skin, call your doctor right away. Only he or she can diagnose a problem. If you have skin cancer, seeing your doctor can be the first step toward getting the treatment that could save your life.   [employer] last reviewed this educational content on 4/1/2019 2000-2020 [employer]. [address] [place] [address], [place] [address]. All rights reserved. This information is not intended as a substitute for professional medical care. Always follow your healthcare professional's instructions.       When should I call my doctor?  If you are worsening or not improving, please, contact us or seek urgent care as noted below.     Who should I call with questions (adults)?    [facility] [phone]  For urgent needs outside of business hours call the [facility] at  [phone] and ask for the dermatology resident on call to be paged  If this is a medical emergency and you are unable to reach an ER, Call 911      If you need a prescription refill, please contact your pharmacy. Refills are approved or denied by our Physicians during normal business hours, Monday through Fridays  Per office policy, refills will not be granted if you have not been seen within the past year (or sooner depending on your child's condition)

## 2024-11-13 NOTE — PROGRESS NOTES
[facility] Dermatology Note  Encounter Date: Nov 13, 2024  Office Visit     Reviewed patients past medical history and pertinent chart review prior to patients visit today.     Dermatology Problem List:  Last skin check: 10/09/2024  Actinic keratosis, right eyebrow, cryo 11/13/2024     Personal Hx: no personal history of skin cancer  Family Hx: Positive for melanoma, father  _________________________________________    Assessment & Plan:     # Actinic keratosis, right eyebrow  Premalignant nature discussed with patient. Treatment options discussed with patient today including no treatment, topical treatment, and cryotherapy. Patient elects to treat visible lesions today with cryotherapy. After verbal consent and discussion of risks and benefits including but no limited to dyspigmentation/scar, blister, and pain. A total of 1 actinic keratoses were treated with 1-2mm freeze border for 2 cycle with liquid nitrogen. Post cryotherapy instructions were provided.     Follow-up:  1 year(s) for follow up full body skin exam, prn for new or changing lesions or new concerns    Kalpana Flores CNP  Dermatology   __________________________________________    CC: Actinic Keratosis (Cryo on Ak - above right eyebrow)    HPI:  Ms. Margo Chris is a(n) 59 year old female who presents today as a return patient for treatment of actinic keratosis on the right eyebrow.  I last saw her for her skin check on 10/9/2024 but her son was getting  recently after so she deferred treatment until today.  No changes in the lesion.    Her father has a history of melanoma.    Patient is otherwise feeling well, without additional skin concerns.     Physical Exam:  Vitals: LMP 01/26/2018 (Approximate)   SKIN: Lesion on the right eyebrow  -right eyebrow, pink macule(s) with overlying adherent scale consistent with an actinic keratosis     - No other lesions of concern on areas examined.     Medications:  Current Outpatient  Medications   Medication Sig Dispense Refill    buPROPion (WELLBUTRIN XL) 300 MG 24 hr tablet Take 1 tablet (300 mg) by mouth every morning. 90 tablet 5    Multiple Vitamins-Minerals (MULTIVITAMIN ADULT) CHEW Take 2 chew tab by mouth daily      bisacodyl (DULCOLAX) 5 MG EC tablet Two days prior to exam take two (2) tablets at 4pm. One day prior to exam take two (2) tablets at 4pm 4 tablet 0    naltrexone-bupropion (CONTRAVE) 8-90 MG per 12 hr tablet Wk 1: 1 tab in AM daily, Wk 2: 1 tab in AM and 1 tab PM, Wk 3: 2 tabs in AM and 1 tab in PM, Wk 4 and on: 2 tabs in AM and 2 tabs PM. 120 tablet 0    phentermine (ADIPEX-P) 15 MG capsule Take 2 capsules (30 mg) by mouth every morning 60 capsule 5    polyethylene glycol (GOLYTELY) 236 g suspension Two days before procedure at 5PM fill first container with water. Mix and drink an 8 oz glass every 10-15 minutes until HALF of the container is gone. Place the remainder in the refrigerator. One day before procedure at 5PM drink second half of bowel prep. Drink an 8 oz glass every 10-15 minutes until it is gone. Day of procedure 6 hours before arrival time fill the 2nd container with water. Mix and drink an 8 oz glass every 10-15 minutes until HALF of the container is gone. Discard the remaining solution. 8000 mL 0     No current facility-administered medications for this visit.      Past Medical History:   Patient Active Problem List   Diagnosis    Chronic fatigue syndrome    Hyperlipidemia    Class 1 obesity without serious comorbidity with body mass index (BMI) of 30.0 to 30.9 in adult    S/P vaginal hysterectomy    Abnormal weight gain    Family history of diabetes mellitus    Gilbert syndrome    Subclinical hypothyroidism    Urticaria     Past Medical History:   Diagnosis Date    Chronic depression     Gilbert's syndrome     Obesity, unspecified     Other and unspecified hyperlipidemia      CC Sera Turner MD  [address]  [place] [address] on close of this  encounter.

## 2024-11-27 ENCOUNTER — PRE VISIT (OUTPATIENT)
Dept: UROLOGY | Facility: CLINIC | Age: 59
End: 2024-11-27
Payer: COMMERCIAL

## 2024-11-27 NOTE — TELEPHONE ENCOUNTER
Reason for visit: Pelvic organ prolapse     Relevant information: According to referring provider-urinary retention and chronic constipation refractory to laxatives, describes feeling prolapsing tissue in vagina, some description of pelvic floor dysfunction/rectal outlet obstruction with defecation     Records/imaging/labs/orders: All records available    Pt called: No need for a call    At Rooming: Does the patient have UTI symptoms?  YES-Have patient attempt to void in a hat. Get a catheterized urine PVR. Collect and dip the cathed sample if available. Ask if the patient is comfortable staying undressed from catheterization to meet Dr. Pierce. IF a pessary consult have the pessary kit and a blue biohazard bin.  NO- Have the patient empty their bladder as best they can into a hat, then perform a cathed PVR. Ask if the patient is comfortable meeting Dr. Pierce undressed for exam. IF a pessary consult have the pessary kit and a blue biohazard bin.    Rosette Damico  11/27/2024  1:39 PM

## 2024-12-09 ENCOUNTER — TELEPHONE (OUTPATIENT)
Dept: ENDOCRINOLOGY | Facility: CLINIC | Age: 59
End: 2024-12-09
Payer: COMMERCIAL

## 2024-12-09 NOTE — TELEPHONE ENCOUNTER
Patient confirmed scheduled appointment:     Date: 2/25/2025  Time: 1:30 PM  Visit type: Return Weight Management  Visit mode: Virtual Visit  Provider:  Nia Workman PA-C  [place] [facility] [place]:   Rescheduled from 1/31/2025  
English

## 2024-12-12 ENCOUNTER — ANCILLARY PROCEDURE (OUTPATIENT)
Dept: GENERAL RADIOLOGY | Facility: CLINIC | Age: 59
End: 2024-12-12
Attending: UROLOGY
Payer: COMMERCIAL

## 2024-12-12 ENCOUNTER — OFFICE VISIT (OUTPATIENT)
Dept: UROLOGY | Facility: CLINIC | Age: 59
End: 2024-12-12
Attending: PHYSICIAN ASSISTANT
Payer: COMMERCIAL

## 2024-12-12 ENCOUNTER — PRE VISIT (OUTPATIENT)
Dept: UROLOGY | Facility: CLINIC | Age: 59
End: 2024-12-12

## 2024-12-12 VITALS
HEART RATE: 63 BPM | HEIGHT: 61 IN | SYSTOLIC BLOOD PRESSURE: 133 MMHG | BODY MASS INDEX: 23.88 KG/M2 | WEIGHT: 126.5 LBS | DIASTOLIC BLOOD PRESSURE: 73 MMHG | OXYGEN SATURATION: 99 %

## 2024-12-12 DIAGNOSIS — M79.18 MYALGIA OF PELVIC FLOOR: ICD-10-CM

## 2024-12-12 DIAGNOSIS — M62.89 PELVIC FLOOR DYSFUNCTION: ICD-10-CM

## 2024-12-12 DIAGNOSIS — N81.9 VAGINAL VAULT PROLAPSE: ICD-10-CM

## 2024-12-12 DIAGNOSIS — K59.09 CHRONIC CONSTIPATION: ICD-10-CM

## 2024-12-12 DIAGNOSIS — R19.8 ABNORMAL DEFECATION: Primary | ICD-10-CM

## 2024-12-12 DIAGNOSIS — R19.8 ABNORMAL DEFECATION: ICD-10-CM

## 2024-12-12 PROCEDURE — 74270 X-RAY XM COLON 1CNTRST STD: CPT | Performed by: RADIOLOGY

## 2024-12-12 ASSESSMENT — PAIN SCALES - GENERAL: PAINLEVEL_OUTOF10: NO PAIN (0)

## 2024-12-12 NOTE — NURSING NOTE
"Chief Complaint   Patient presents with    Consult     Pelvic organ prolapse       Blood pressure 133/73, pulse 63, height 1.537 m (5' 0.5\"), weight 57.4 kg (126 lb 8 oz), last menstrual period 01/26/2018, SpO2 99%, not currently breastfeeding. Body mass index is 24.3 kg/m .    Patient Active Problem List   Diagnosis    Chronic fatigue syndrome    Hyperlipidemia    Class 1 obesity without serious comorbidity with body mass index (BMI) of 30.0 to 30.9 in adult    S/P vaginal hysterectomy    Abnormal weight gain    Family history of diabetes mellitus    Gilbert syndrome    Subclinical hypothyroidism    Urticaria       Allergies   Allergen Reactions    Food      Apricot, mangos, peaches    Sulfa Antibiotics Rash       Current Outpatient Medications   Medication Sig Dispense Refill    buPROPion (WELLBUTRIN XL) 300 MG 24 hr tablet Take 1 tablet (300 mg) by mouth every morning. 90 tablet 5    Multiple Vitamins-Minerals (MULTIVITAMIN ADULT) CHEW Take 2 chew tab by mouth daily      bisacodyl (DULCOLAX) 5 MG EC tablet Two days prior to exam take two (2) tablets at 4pm. One day prior to exam take two (2) tablets at 4pm 4 tablet 0    naltrexone-bupropion (CONTRAVE) 8-90 MG per 12 hr tablet Wk 1: 1 tab in AM daily, Wk 2: 1 tab in AM and 1 tab PM, Wk 3: 2 tabs in AM and 1 tab in PM, Wk 4 and on: 2 tabs in AM and 2 tabs PM. 120 tablet 0    phentermine (ADIPEX-P) 15 MG capsule Take 2 capsules (30 mg) by mouth every morning 60 capsule 5    polyethylene glycol (GOLYTELY) 236 g suspension Two days before procedure at 5PM fill first container with water. Mix and drink an 8 oz glass every 10-15 minutes until HALF of the container is gone. Place the remainder in the refrigerator. One day before procedure at 5PM drink second half of bowel prep. Drink an 8 oz glass every 10-15 minutes until it is gone. Day of procedure 6 hours before arrival time fill the 2nd container with water. Mix and drink an 8 oz glass every 10-15 minutes until HALF " of the container is gone. Discard the remaining solution. 8000 mL 0       Social History     Tobacco Use    Smoking status: Never    Smokeless tobacco: Never   Vaping Use    Vaping status: Never Used   Substance Use Topics    Alcohol use: No    Drug use: No       Sujit Molina CMA  12/12/2024  10:46 AM

## 2024-12-12 NOTE — PATIENT INSTRUCTIONS
Double voiding-stand up, count to 10 and then sit back down to urinate    Websites with free information:    American [employer] patient website: www.voicesforpfd.org    Total Control Program: www.totalcontrolprogram.com    Please do the defecography and see colorectal    It was a pleasure meeting with you today.  Thank you for allowing me and my team the privilege of caring for you today.  YOU are the reason we are here, and I truly hope we provided you with the excellent service you deserve.  Please let us know if there is anything else we can do for you so that we can be sure you are leaving completely satisfied with your care experience.

## 2024-12-12 NOTE — PROGRESS NOTES
December 12, 2024    Referring Provider: Eliezer Rahman PA-C  [address] [place] [address]    Primary Care Provider: No Ref-Primary, Physician    Assessment & Plan     Chronic constipation/Abnormal defecation  Longs standing symptoms, question rectal intussusception so will order defecography and have her see colorectal.  Needs screening colonoscopy  - Adult Uro/Gyn  Referral  - XR Defecography; Future  - Adult Colorectal Surgery  Referral; Future    Vaginal vault prolapse  Management in part will depend  - Adult Uro/Gyn  Referral  - XR Defecography; Future  - Adult Colorectal Surgery  Referral; Future    Myalgia of pelvic floor/Pelvic floor dysfunction  Will consider PT but need her to return after testing and seeing colorectal  - XR Defecography; Future  - Adult Colorectal Surgery  Referral; Future    RTC after seeing colorectal    25 minutes were spent on this day of the encounter in reviewing the EMR including reviewing GI note and OBGYN operative note, direct patient care including ordering defecography and colorectal referral, coordination of care and documentation    Flaquita Pierce MD MPH  (she/her/hers)   of Urology  [facility]      HPI:  Margo Chris is a 59 year old female who presents for evaluation of her pelvic floor symptoms.  She is referred by her GI PA Eliezer Rahman PA-C, note from 9/9/24 reviewed in Epic.  She has a long history of constipation and states that she really has to strain to have BM.  Also reports sometimes needed to crede to empty her bladder.  Denies gross hematuria, UTI, urinary incontinence    LAVH with bilateral salpingectomy 2/17/2018 by Dr Tim for menorrhagia and dysmenorrhea.    Also due for screening colonoscopy.  Had it scheduled but broke up her partner right before so did not have a ride and had to cancel.    Past Medical History:   Diagnosis Date    Chronic depression      Gilbert's syndrome     Obesity, unspecified     Other and unspecified hyperlipidemia      Past Surgical History:   Procedure Laterality Date    HC TOOTH EXTRACTION W/FORCEP      LAPAROSCOPIC ASSISTED HYSTERECTOMY VAGINAL N/A 2/16/2018    Procedure: LAPAROSCOPIC ASSISTED HYSTERECTOMY VAGINAL;  LAPAROSCOPIC ASSISTED VAGINAL HYSTERECTOMY BILATERAL SALPINGECTOMY ;  Surgeon: Hiwot Tim MD;  Location: [facility]    LAPAROSCOPIC SALPINGECTOMY Bilateral 2/16/2018    Procedure: LAPAROSCOPIC SALPINGECTOMY;;  Surgeon: Hiwot Tim MD;  Location: [facility]     Social History     Socioeconomic History    Marital status:      Spouse name: Not on file    Number of children: Not on file    Years of education: Not on file    Highest education level: Not on file   Occupational History    Not on file   Tobacco Use    Smoking status: Never    Smokeless tobacco: Never   Vaping Use    Vaping status: Never Used   Substance and Sexual Activity    Alcohol use: No    Drug use: No    Sexual activity: Not on file   Other Topics Concern    Parent/sibling w/ CABG, MI or angioplasty before 65F 55M? Not Asked   Social History Narrative    Not on file     Social Drivers of Health     Financial Resource Strain: Not on file   Food Insecurity: Not on file   Transportation Needs: Not on file   Physical Activity: Not on file   Stress: Not on file   Social Connections: Not on file   Interpersonal Safety: Not on file   Housing Stability: Not on file       Family History   Problem Relation Age of Onset    Hypertension Mother     Diabetes Father     Hypertension Father     Lung Cancer Father     Melanoma Father     Skin Cancer Father        ROS    Allergies   Allergen Reactions    Food      Apricot, mangos, peaches    Sulfa Antibiotics Rash       Current Outpatient Medications   Medication Sig Dispense Refill    buPROPion (WELLBUTRIN XL) 300 MG 24 hr tablet Take 1 tablet (300 mg) by mouth every morning. 90 tablet 5    Multiple Vitamins-Minerals  "(MULTIVITAMIN ADULT) CHEW Take 2 chew tab by mouth daily      bisacodyl (DULCOLAX) 5 MG EC tablet Two days prior to exam take two (2) tablets at 4pm. One day prior to exam take two (2) tablets at 4pm 4 tablet 0    naltrexone-bupropion (CONTRAVE) 8-90 MG per 12 hr tablet Wk 1: 1 tab in AM daily, Wk 2: 1 tab in AM and 1 tab PM, Wk 3: 2 tabs in AM and 1 tab in PM, Wk 4 and on: 2 tabs in AM and 2 tabs PM. 120 tablet 0    phentermine (ADIPEX-P) 15 MG capsule Take 2 capsules (30 mg) by mouth every morning 60 capsule 5    polyethylene glycol (GOLYTELY) 236 g suspension Two days before procedure at 5PM fill first container with water. Mix and drink an 8 oz glass every 10-15 minutes until HALF of the container is gone. Place the remainder in the refrigerator. One day before procedure at 5PM drink second half of bowel prep. Drink an 8 oz glass every 10-15 minutes until it is gone. Day of procedure 6 hours before arrival time fill the 2nd container with water. Mix and drink an 8 oz glass every 10-15 minutes until HALF of the container is gone. Discard the remaining solution. 8000 mL 0     No current facility-administered medications for this visit.       /73 (BP Location: Right arm, Patient Position: Sitting, Cuff Size: Adult Regular)   Pulse 63   Ht 1.537 m (5' 0.5\")   Wt 57.4 kg (126 lb 8 oz)   LMP 01/26/2018 (Approximate)   SpO2 99%   BMI 24.30 kg/m    GENERAL: healthy, alert and no distress  EYES: Eyes grossly normal to inspection, conjunctivae and sclerae normal  HENT: normal cephalic/atraumatic.  External ears, nose and mouth without ulcers or lesions.  RESP: no audible wheeze, cough, or visible cyanosis.  No visible retractions or increased work of breathing.  Able to speak fully in complete sentences.  NEURO: Cranial nerves grossly intact, mentation intact and speech normal  PSYCH: mentation appears normal, affect normal/bright, judgement and insight intact, normal speech and appearance well-groomed  ABD: " soft, nontender, non distended, no CVAT  : Normal external genitalia, she has stage II support on supine strain, TVL 6 with some apical descent, mild myofascial tenderness, negative CST    PVR 0 mL by bladder scan      CC  Patient Care Team:  No Ref-Primary, Physician as PCP - General  Alvaro Blackmon MD as MD (INTERNAL MEDICINE - ENDOCRINOLOGY, DIABETES & METABOLISM)  Sera Turner MD as MD (OB/Gyn)  Dalia Ruano PA-C as Physician Assistant (Gastroenterology)  Krystal Flores APRN CNP as Nurse Practitioner (Dermatology)  Sera Turner MD as Assigned OBGYN Provider  Ho Rahman PA-C as Assigned Gastroenterology Provider  Flaquita Pierce MD as MD (Urology)  Alvaro Blackmon MD as Assigned Endocrinology Provider  Krystal Flores APRN CNP as Assigned Surgical Provider  HO RAHMAN

## 2024-12-12 NOTE — LETTER
12/12/2024       RE: Margo Chris  [address] [place] [address]     Dear Colleague,    Thank you for referring your patient, Margo Chris, to the [facility]OLOG[facility] [place] at [facility]. Please see a copy of my visit note below.    December 12, 2024    Referring Provider: Eliezer Rahman PA-C  [address] [place] [address]    Primary Care Provider: No Ref-Primary, Physician    Assessment & Plan    Chronic constipation/Abnormal defecation  Longs standing symptoms, question rectal intussusception so will order defecography and have her see colorectal.  Needs screening colonoscopy  - Adult Uro/Gyn  Referral  - XR Defecography; Future  - Adult Colorectal Surgery  Referral; Future    Vaginal vault prolapse  Management in part will depend  - Adult Uro/Gyn  Referral  - XR Defecography; Future  - Adult Colorectal Surgery  Referral; Future    Myalgia of pelvic floor/Pelvic floor dysfunction  Will consider PT but need her to return after testing and seeing colorectal  - XR Defecography; Future  - Adult Colorectal Surgery  Referral; Future    RTC after seeing colorectal    25 minutes were spent on this day of the encounter in reviewing the EMR including reviewing GI note and OBGYN operative note, direct patient care including ordering defecography and colorectal referral, coordination of care and documentation    Flaquita Pierce MD MPH  (she/her/hers)   of Urology  [facility]      HPI:  Margo Chris is a 59 year old female who presents for evaluation of her pelvic floor symptoms.  She is referred by her GI PA Eliezer Rahman PA-C, note from 9/9/24 reviewed in Epic.  She has a long history of constipation and states that she really has to strain to have BM.  Also reports sometimes needed to crede to empty her bladder.  Denies gross hematuria, UTI,  urinary incontinence    LAVH with bilateral salpingectomy 2/17/2018 by Dr Tim for menorrhagia and dysmenorrhea.    Also due for screening colonoscopy.  Had it scheduled but broke up her partner right before so did not have a ride and had to cancel.    Past Medical History:   Diagnosis Date     Chronic depression      Gilbert's syndrome      Obesity, unspecified      Other and unspecified hyperlipidemia      Past Surgical History:   Procedure Laterality Date     HC TOOTH EXTRACTION W/FORCEP       LAPAROSCOPIC ASSISTED HYSTERECTOMY VAGINAL N/A 2/16/2018    Procedure: LAPAROSCOPIC ASSISTED HYSTERECTOMY VAGINAL;  LAPAROSCOPIC ASSISTED VAGINAL HYSTERECTOMY BILATERAL SALPINGECTOMY ;  Surgeon: Hiwot Tim MD;  Location: [facility]     LAPAROSCOPIC SALPINGECTOMY Bilateral 2/16/2018    Procedure: LAPAROSCOPIC SALPINGECTOMY;;  Surgeon: Hiwot Tim MD;  Location: [facility]     Social History     Socioeconomic History     Marital status:      Spouse name: Not on file     Number of children: Not on file     Years of education: Not on file     Highest education level: Not on file   Occupational History     Not on file   Tobacco Use     Smoking status: Never     Smokeless tobacco: Never   Vaping Use     Vaping status: Never Used   Substance and Sexual Activity     Alcohol use: No     Drug use: No     Sexual activity: Not on file   Other Topics Concern     Parent/sibling w/ CABG, MI or angioplasty before 65F 55M? Not Asked   Social History Narrative     Not on file     Social Drivers of Health     Financial Resource Strain: Not on file   Food Insecurity: Not on file   Transportation Needs: Not on file   Physical Activity: Not on file   Stress: Not on file   Social Connections: Not on file   Interpersonal Safety: Not on file   Housing Stability: Not on file       Family History   Problem Relation Age of Onset     Hypertension Mother      Diabetes Father      Hypertension Father      Lung Cancer Father      Melanoma  "Father      Skin Cancer Father        ROS    Allergies   Allergen Reactions     Food      Apricot, mangos, peaches     Sulfa Antibiotics Rash       Current Outpatient Medications   Medication Sig Dispense Refill     buPROPion (WELLBUTRIN XL) 300 MG 24 hr tablet Take 1 tablet (300 mg) by mouth every morning. 90 tablet 5     Multiple Vitamins-Minerals (MULTIVITAMIN ADULT) CHEW Take 2 chew tab by mouth daily       bisacodyl (DULCOLAX) 5 MG EC tablet Two days prior to exam take two (2) tablets at 4pm. One day prior to exam take two (2) tablets at 4pm 4 tablet 0     naltrexone-bupropion (CONTRAVE) 8-90 MG per 12 hr tablet Wk 1: 1 tab in AM daily, Wk 2: 1 tab in AM and 1 tab PM, Wk 3: 2 tabs in AM and 1 tab in PM, Wk 4 and on: 2 tabs in AM and 2 tabs PM. 120 tablet 0     phentermine (ADIPEX-P) 15 MG capsule Take 2 capsules (30 mg) by mouth every morning 60 capsule 5     polyethylene glycol (GOLYTELY) 236 g suspension Two days before procedure at 5PM fill first container with water. Mix and drink an 8 oz glass every 10-15 minutes until HALF of the container is gone. Place the remainder in the refrigerator. One day before procedure at 5PM drink second half of bowel prep. Drink an 8 oz glass every 10-15 minutes until it is gone. Day of procedure 6 hours before arrival time fill the 2nd container with water. Mix and drink an 8 oz glass every 10-15 minutes until HALF of the container is gone. Discard the remaining solution. 8000 mL 0     No current facility-administered medications for this visit.       /73 (BP Location: Right arm, Patient Position: Sitting, Cuff Size: Adult Regular)   Pulse 63   Ht 1.537 m (5' 0.5\")   Wt 57.4 kg (126 lb 8 oz)   LMP 01/26/2018 (Approximate)   SpO2 99%   BMI 24.30 kg/m    GENERAL: healthy, alert and no distress  EYES: Eyes grossly normal to inspection, conjunctivae and sclerae normal  HENT: normal cephalic/atraumatic.  External ears, nose and mouth without ulcers or lesions.  RESP: " no audible wheeze, cough, or visible cyanosis.  No visible retractions or increased work of breathing.  Able to speak fully in complete sentences.  NEURO: Cranial nerves grossly intact, mentation intact and speech normal  PSYCH: mentation appears normal, affect normal/bright, judgement and insight intact, normal speech and appearance well-groomed  ABD: soft, nontender, non distended, no CVAT  : Normal external genitalia, she has stage II support on supine strain, TVL 6 with some apical descent, mild myofascial tenderness, negative CST    PVR 0 mL by bladder scan      CC  Patient Care Team:  No Ref-Primary, Physician as PCP - General  Alvaro Blackmon MD as MD (INTERNAL MEDICINE - ENDOCRINOLOGY, DIABETES & METABOLISM)  Sera Turner MD as MD (OB/Gyn)  Dalia Ruano PA-C as Physician Assistant (Gastroenterology)  Krystal Flores APRN CNP as Nurse Practitioner (Dermatology)  Sera Turner MD as Assigned OBGYN Provider  Ho Rahman PA-C as Assigned Gastroenterology Provider  Flaquita Pierce MD as MD (Urology)  Alvaro Blackmon MD as Assigned Endocrinology Provider  Krystal Flores APRN CNP as Assigned Surgical Provider  HO RAHMAN                Again, thank you for allowing me to participate in the care of your patient.      Sincerely,    Flaquita Pierce MD

## 2024-12-16 NOTE — TELEPHONE ENCOUNTER
Diagnosis, Referred by & from: Chronic Constipation, Abnormal Defecation, Vaginal Vault Prolapse, Myalgia of Pelvic Floor, Pelvic Floor Dysfunction   Appt date: 3/12/2025   NOTES STATUS DETAILS   OFFICE NOTE from referring provider Internal [facility]:  24 - URO OV with Dr. Pierce   OFFICE [facility] other specialist [facility] - 24 - GI OV with LUCAS Licea  4/15/24 - GI OV with LUCAS Brewer    [facility]:  24 - OBGYN OV with  Dr. Turner   DISCHARGE SUMMARY from hospital N/A    DISCHARGE REPORT from the ER N/A    OPERATIVE REPORT Internal [facility]:  18 - OP Note for LAPAROSCOPIC ASSISTED VAGINAL HYSTERECTOMY BILATERAL SALPINGECTOMY with Dr. Tim   MEDICATION LIST Internal    LABS     ANAL PAP/CEA N/A    BIOPSIES/PATHOLOGY RELATED TO DIAGNOSIS N/A    DIAGNOSTIC PROCEDURES     PFC TESTING (from the Pelvic floor center includes Manometry, PDNL, EMG, etc.) N/A    COLONOSCOPY (most recent all time after 5 years) N/A    FLEX SIGMOIDOSCOPY N/A    UPPER ENDOSCOPY (EGD) N/A    ERCP N/A    IMAGING (DISC & REPORT)      CT N/A    MRI N/A    XRAY Internal [facility]:  24 - XR Defecography   ULTRASOUND  (ENDOANAL/ENDORECTAL) N/A

## 2025-01-13 ENCOUNTER — VIRTUAL VISIT (OUTPATIENT)
Dept: ENDOCRINOLOGY | Facility: CLINIC | Age: 60
End: 2025-01-13
Payer: COMMERCIAL

## 2025-01-13 VITALS — HEIGHT: 60 IN | WEIGHT: 129 LBS | BODY MASS INDEX: 25.32 KG/M2

## 2025-01-13 DIAGNOSIS — Z86.39 HX OF OBESITY: ICD-10-CM

## 2025-01-13 DIAGNOSIS — E66.3 OVERWEIGHT (BMI 25.0-29.9): ICD-10-CM

## 2025-01-13 PROCEDURE — 98007 SYNCH AUDIO-VIDEO EST HI 40: CPT

## 2025-01-13 RX ORDER — METFORMIN HYDROCHLORIDE 500 MG/1
TABLET, EXTENDED RELEASE ORAL
Qty: 60 TABLET | Refills: 3 | Status: SHIPPED | OUTPATIENT
Start: 2025-01-13

## 2025-01-13 ASSESSMENT — PAIN SCALES - GENERAL: PAINLEVEL_OUTOF10: NO PAIN (0)

## 2025-01-13 NOTE — LETTER
2025       RE: Margo Chris  [address] [place] [address]     Dear Colleague,    Thank you for referring your patient, Margo Chris, to the [facility] [place] at [facility]. Please see a copy of my visit note below.      Video-Visit Details    Type of service:  Video Visit   Originating Location (pt. Location): Home    Distant Location (provider location):  Off-site  Platform used for Video Visit: [facility] Medical Weight Management Note     Margo Chris  MRN:  [id number]  :  1965  RAUL:  2025    Dear Physician No Ref-Primary,    I had the pleasure of seeing your patient Margo Chris. She is a 59 year old female who I am continuing to see for treatment of obesity related to:         No data to display                Assessment & Plan  Problem List Items Addressed This Visit       Hx of obesity    Overweight (BMI 25.0-29.9)     Since last visit she has stopped phentermine and started Wellbutrin. Overall this has been a great transitions. Wellbutrin has been helpful with stress and seasonal affect. No side effects. While it has not been helpful with weight loss, has been weight stable - which she historically gains 5lb through the winter. So, this is a very big win.     Discussed increasing protein and doing at least two days of weight bearing exercises.     Will start Metformin to see if any help with assumed insulin resistance. No contraindications. Discussed side effects, risks, and benefits. No hx of kidney disease. hx of Gilbert syndrome. Will monitor liver labs. GFR 88.          Relevant Medications    metFORMIN (GLUCOPHAGE XR) 500 MG 24 hr tablet    Other Relevant Orders    Basic metabolic panel      Start Metformin 500mg - take 1 tablet with a meal once daily for 7 days, then increase to 2 tablets with a meal once daily.   Continue Wellbutrin 300mg daily. No  refills needed   Labs ordered  Nia Us in 6 months     INTERVAL HISTORY:  Dr. Blackmon - 12/2010 and started Phentermine   Starting weight - 163lb  Last seen by Dr. Blackmon 11/15/2021. First seen by me on 1/2/2023  Continue Phentermine 15-30mg once daily - historically has interchanged dose as needed  Saw around 10lb weight regain and felt like Phentermine was no longer helping. Discontinued Phentermine   Contrave not covered by insurance, and she did not want to persue appeal. Started Wellbutrin         While she has not lost weight, she has not gained any weight this winter - which is a huge win. She has been weight stable around 128lb.     Has been really working on body image mentally. Liked better when she was 10lb less. Clothes are fitting a little tighter. Overall has been mentally ok with this weight, but really has a hard time that clothes are tighter.     Anti-obesity medication history    Current:   Wellbutrin 300mg - has been helpful with emotional eating and seasonal affect. No side effects. No anxiety. /73 on 12/12/24.     Would like to continue off phentermine. Has felt like this has been a better fit overall.       Recent diet changes: Has been really mindful of food choices. Decrease in boredom and emotional eating. Protein focused - around 60g daily.     Recent exercise/activity changes: walking daily - 10k steps daily. Yoga 2xweek     Recent stressors: Has a lot of transitions over the past year - retired, father passed, broke up with long term relationship.     Recent sleep changes: stable    Vitamins/Labs: needs updated LFT     CURRENT WEIGHT:   129 lbs 0 oz    Initial Weight (lbs): 163 lbs  Last Visits Weight: 57.6 kg (127 lb)  Cumulative weight loss (lbs): 34  Weight Loss Percentage: 20.86%    Wt Readings from Last 5 Encounters:   01/13/25 58.5 kg (129 lb)   12/12/24 57.4 kg (126 lb 8 oz)   09/23/24 57.6 kg (127 lb)   05/06/24 57.6 kg (127 lb)   04/04/24 58.5 kg (129 lb)              1/12/2025    12:21 PM   Changes and Difficulties   I have made the following changes to my diet since my last visit: None   With regards to my diet, I am still struggling with: Not losing weight   I have made the following changes to my activity/exercise since my last visit: None   With regards to my activity/exercise, I am still struggling with: None         MEDICATIONS:   Current Outpatient Medications   Medication Sig Dispense Refill     buPROPion (WELLBUTRIN XL) 300 MG 24 hr tablet Take 1 tablet (300 mg) by mouth every morning. 90 tablet 5     metFORMIN (GLUCOPHAGE XR) 500 MG 24 hr tablet 1 tablet by mouth daily with meal for 1 week, then 2 tablets daily with meal. 60 tablet 3     Multiple Vitamins-Minerals (MULTIVITAMIN ADULT) CHEW Take 2 chew tab by mouth daily             1/12/2025    12:21 PM   Weight Loss Medication History Reviewed With Patient   Which weight loss medications are you currently taking on a regular basis? Bupropion   Are you having any side effects from the weight loss medication that we have prescribed you? No       Lab on 05/07/2024   Component Date Value Ref Range Status     Tissue Transglutaminase Antibody I* 05/07/2024 0.7  <7.0 U/mL Final    Negative- The tTG-IgA assay has limited utility for patients with decreased levels of IgA. Screening for celiac disease should include IgA testing to rule out selective IgA deficiency and to guide selection and interpretation of serological testing. tTG-IgG testing may be positive in celiac disease patients with IgA deficiency.     Tissue Transglutaminase Antibody I* 05/07/2024 0.9  <7.0 U/mL Final    Negative     Immunoglobulin A 05/07/2024 266  84 - 499 mg/dL Final     Protein Total 05/07/2024 7.4  6.4 - 8.3 g/dL Final     Albumin 05/07/2024 4.7  3.5 - 5.2 g/dL Final     Bilirubin Total 05/07/2024 1.2  <=1.2 mg/dL Final     Alkaline Phosphatase 05/07/2024 94  40 - 150 U/L Final    Reference intervals for this test were updated on  11/14/2023 to more accurately reflect our healthy population. There may be differences in the flagging of prior results with similar values performed with this method. Interpretation of those prior results can be made in the context of the updated reference intervals.     AST 05/07/2024 24  0 - 45 U/L Final    Reference intervals for this test were updated on 6/12/2023 to more accurately reflect our healthy population. There may be differences in the flagging of prior results with similar values performed with this method. Interpretation of those prior results can be made in the context of the updated reference intervals.     ALT 05/07/2024 21  0 - 50 U/L Final    Reference intervals for this test were updated on 6/12/2023 to more accurately reflect our healthy population. There may be differences in the flagging of prior results with similar values performed with this method. Interpretation of those prior results can be made in the context of the updated reference intervals.       Bilirubin Direct 05/07/2024 0.24  0.00 - 0.30 mg/dL Final           Objective   Ht 1.524 m (5')   Wt 58.5 kg (129 lb)   LMP 01/26/2018 (Approximate)   BMI 25.19 kg/m    Vitals - Patient Reported  Pain Score: No Pain (0)      Vitals:  No vitals were obtained today due to virtual visit.      PHYSICAL EXAM:  GENERAL: alert and no distress  EYES: Eyes grossly normal to inspection.  No discharge or erythema, or obvious scleral/conjunctival abnormalities.  RESP: No audible wheeze, cough, or visible cyanosis.    SKIN: Visible skin clear. No significant rash, abnormal pigmentation or lesions.  NEURO: Cranial nerves grossly intact.  Mentation and speech appropriate for age.  PSYCH: Appropriate affect, tone, and pace of words        Sincerely,    Nia Workman PA-C      44 minutes spent by me on the date of the encounter doing chart review, history and exam, documentation and further activities per the note    The longitudinal plan of care  for the diagnosis(es)/condition(s) as documented were addressed during this visit. Due to the added complexity in care, I will continue to support Margo in the subsequent management and with ongoing continuity of care.      Again, thank you for allowing me to participate in the care of your patient.      Sincerely,    Nia Workman PA-C

## 2025-01-13 NOTE — NURSING NOTE
Is the patient currently in the state of [place]? YES    Visit mode:VIDEO    If the visit is dropped, the patient can be reconnected by: VIDEO VISIT: Send to e-mail at: [email or web address]    Will anyone else be joining the visit? NO  (If patient encounters technical issues they should call [phone]    How would you like to obtain your AVS? MyChart    Are changes needed to the allergy or medication list? No    Are refills needed on medications prescribed by this physician? NO    Reason for visit: SEAN SOLIS

## 2025-01-13 NOTE — PROGRESS NOTES
Video-Visit Details    Type of service:  Video Visit   Originating Location (pt. Location): Home    Distant Location (provider location):  Off-site  Platform used for Video Visit: [facility] Medical Weight Management Note     Margo Chris  MRN:  [id number]  :  1965  RAUL:  2025    Dear Physician No Ref-Primary,    I had the pleasure of seeing your patient Margo Chris. She is a 59 year old female who I am continuing to see for treatment of obesity related to:         No data to display                Assessment & Plan   Problem List Items Addressed This Visit       Hx of obesity    Overweight (BMI 25.0-29.9)     Since last visit she has stopped phentermine and started Wellbutrin. Overall this has been a great transitions. Wellbutrin has been helpful with stress and seasonal affect. No side effects. While it has not been helpful with weight loss, has been weight stable - which she historically gains 5lb through the winter. So, this is a very big win.     Discussed increasing protein and doing at least two days of weight bearing exercises.     Will start Metformin to see if any help with assumed insulin resistance. No contraindications. Discussed side effects, risks, and benefits. No hx of kidney disease. hx of Gilbert syndrome. Will monitor liver labs. GFR 88.          Relevant Medications    metFORMIN (GLUCOPHAGE XR) 500 MG 24 hr tablet    Other Relevant Orders    Basic metabolic panel      Start Metformin 500mg - take 1 tablet with a meal once daily for 7 days, then increase to 2 tablets with a meal once daily.   Continue Wellbutrin 300mg daily. No refills needed   Labs ordered  Nia Us in 6 months     INTERVAL HISTORY:  Dr. Blackmon - 2010 and started Phentermine   Starting weight - 163lb  Last seen by Dr. Blackmon 11/15/2021. First seen by me on 2023  Continue Phentermine 15-30mg once daily - historically has interchanged dose as needed  Saw around 10lb weight  regain and felt like Phentermine was no longer helping. Discontinued Phentermine   Contrave not covered by insurance, and she did not want to persue appeal. Started Wellbutrin         While she has not lost weight, she has not gained any weight this winter - which is a huge win. She has been weight stable around 128lb.     Has been really working on body image mentally. Liked better when she was 10lb less. Clothes are fitting a little tighter. Overall has been mentally ok with this weight, but really has a hard time that clothes are tighter.     Anti-obesity medication history    Current:   Wellbutrin 300mg - has been helpful with emotional eating and seasonal affect. No side effects. No anxiety. /73 on 12/12/24.     Would like to continue off phentermine. Has felt like this has been a better fit overall.       Recent diet changes: Has been really mindful of food choices. Decrease in boredom and emotional eating. Protein focused - around 60g daily.     Recent exercise/activity changes: walking daily - 10k steps daily. Yoga 2xweek     Recent stressors: Has a lot of transitions over the past year - retired, father passed, broke up with long term relationship.     Recent sleep changes: stable    Vitamins/Labs: needs updated LFT     CURRENT WEIGHT:   129 lbs 0 oz    Initial Weight (lbs): 163 lbs  Last Visits Weight: 57.6 kg (127 lb)  Cumulative weight loss (lbs): 34  Weight Loss Percentage: 20.86%    Wt Readings from Last 5 Encounters:   01/13/25 58.5 kg (129 lb)   12/12/24 57.4 kg (126 lb 8 oz)   09/23/24 57.6 kg (127 lb)   05/06/24 57.6 kg (127 lb)   04/04/24 58.5 kg (129 lb)             1/12/2025    12:21 PM   Changes and Difficulties   I have made the following changes to my diet since my last visit: None   With regards to my diet, I am still struggling with: Not losing weight   I have made the following changes to my activity/exercise since my last visit: None   With regards to my activity/exercise, I am  still struggling with: None         MEDICATIONS:   Current Outpatient Medications   Medication Sig Dispense Refill    buPROPion (WELLBUTRIN XL) 300 MG 24 hr tablet Take 1 tablet (300 mg) by mouth every morning. 90 tablet 5    metFORMIN (GLUCOPHAGE XR) 500 MG 24 hr tablet 1 tablet by mouth daily with meal for 1 week, then 2 tablets daily with meal. 60 tablet 3    Multiple Vitamins-Minerals (MULTIVITAMIN ADULT) CHEW Take 2 chew tab by mouth daily             1/12/2025    12:21 PM   Weight Loss Medication History Reviewed With Patient   Which weight loss medications are you currently taking on a regular basis? Bupropion   Are you having any side effects from the weight loss medication that we have prescribed you? No       Lab on 05/07/2024   Component Date Value Ref Range Status    Tissue Transglutaminase Antibody I* 05/07/2024 0.7  <7.0 U/mL Final    Negative- The tTG-IgA assay has limited utility for patients with decreased levels of IgA. Screening for celiac disease should include IgA testing to rule out selective IgA deficiency and to guide selection and interpretation of serological testing. tTG-IgG testing may be positive in celiac disease patients with IgA deficiency.    Tissue Transglutaminase Antibody I* 05/07/2024 0.9  <7.0 U/mL Final    Negative    Immunoglobulin A 05/07/2024 266  84 - 499 mg/dL Final    Protein Total 05/07/2024 7.4  6.4 - 8.3 g/dL Final    Albumin 05/07/2024 4.7  3.5 - 5.2 g/dL Final    Bilirubin Total 05/07/2024 1.2  <=1.2 mg/dL Final    Alkaline Phosphatase 05/07/2024 94  40 - 150 U/L Final    Reference intervals for this test were updated on 11/14/2023 to more accurately reflect our healthy population. There may be differences in the flagging of prior results with similar values performed with this method. Interpretation of those prior results can be made in the context of the updated reference intervals.    AST 05/07/2024 24  0 - 45 U/L Final    Reference intervals for this test were  updated on 6/12/2023 to more accurately reflect our healthy population. There may be differences in the flagging of prior results with similar values performed with this method. Interpretation of those prior results can be made in the context of the updated reference intervals.    ALT 05/07/2024 21  0 - 50 U/L Final    Reference intervals for this test were updated on 6/12/2023 to more accurately reflect our healthy population. There may be differences in the flagging of prior results with similar values performed with this method. Interpretation of those prior results can be made in the context of the updated reference intervals.      Bilirubin Direct 05/07/2024 0.24  0.00 - 0.30 mg/dL Final           Objective    Ht 1.524 m (5')   Wt 58.5 kg (129 lb)   LMP 01/26/2018 (Approximate)   BMI 25.19 kg/m    Vitals - Patient Reported  Pain Score: No Pain (0)      Vitals:  No vitals were obtained today due to virtual visit.      PHYSICAL EXAM:  GENERAL: alert and no distress  EYES: Eyes grossly normal to inspection.  No discharge or erythema, or obvious scleral/conjunctival abnormalities.  RESP: No audible wheeze, cough, or visible cyanosis.    SKIN: Visible skin clear. No significant rash, abnormal pigmentation or lesions.  NEURO: Cranial nerves grossly intact.  Mentation and speech appropriate for age.  PSYCH: Appropriate affect, tone, and pace of words        Sincerely,    Nia Workman PA-C      44 minutes spent by me on the date of the encounter doing chart review, history and exam, documentation and further activities per the note    The longitudinal plan of care for the diagnosis(es)/condition(s) as documented were addressed during this visit. Due to the added complexity in care, I will continue to support Margo in the subsequent management and with ongoing continuity of care.

## 2025-01-14 NOTE — ASSESSMENT & PLAN NOTE
Since last visit she has stopped phentermine and started Wellbutrin. Overall this has been a great transitions. Wellbutrin has been helpful with stress and seasonal affect. No side effects. While it has not been helpful with weight loss, has been weight stable - which she historically gains 5lb through the winter. So, this is a very big win.     Discussed increasing protein and doing at least two days of weight bearing exercises.     Will start Metformin to see if any help with assumed insulin resistance. No contraindications. Discussed side effects, risks, and benefits. No hx of kidney disease. hx of Gilbert syndrome. Will monitor liver labs. GFR 88.

## 2025-01-14 NOTE — PATIENT INSTRUCTIONS
Visit Plan:     Start Metformin 500mg - take 1 tablet with a meal once daily for 7 days, then increase to 2 tablets with a meal once daily.   Continue Wellbutrin 300mg daily. No refills needed   Labs ordered  Nia Us in 6 months     METFORMIN    We are considering starting metformin. Starting instructions:    Immediate release tablet: Take 1 tablet by mouth daily with a meal for 1 week, then increase to 1 tablet twice daily with meals.   Extended release tablet: Take 1 tablet by mouth daily with a meal for 1 week, then increase to 2 tablets daily with a meal or 1 tablet twice daily with meals.      Metformin is a medication that is used to assist with lowering blood sugars in patients that have Pre-Diabetes or Diabetes. It has also been found to help with weight loss.    Metformin helps to lower blood sugars by lowering the amount of sugar (glucose) your liver produces that would otherwise cause your blood sugar to increase. It also makes your body respond better to insulin (the product that lowers your blood sugar). It is unclear how metformin assists with weight loss.     Side-effects. Metformin is generally well tolerated. The main side-effects we see are: Diarrhea, nausea and stomach upset - this tends to subside over time as your body gets  used to the medication. Taking this medications with food will lower these side effects.    Low blood sugar (hypoglycemia) is rare to occur with metformin, but can occur if you do not eat enough or are taking other medications that assist to lower blood sugar. The signs of low blood sugar are:  Weakness  Shaky   Hungry  Sweating  Confusion      See below for ways to treat low blood sugar without adding in lots of extra calories.    Treating Low Blood Sugar    If you have symptoms of low blood sugar (sweating, shaking, dizzy, confused) eat 15 grams of carbs and wait 15 minutes:    Glucose Tabs are best for sugars under 70 -  Dex4 or BD Glucose tablets are good, you will  need to take 3-4 of these to equal 15 grams.     One small box of raisins  4 oz fruit juice box or   cup fruit juice  1 small apple  1 small banana    cup canned fruit in water    English muffin or a slice of bread with jelly   1 low fat frozen waffle with sugar-free syrup    cup cottage cheese with   cup frozen or fresh blueberries  1 cup skim or low-fat milk    cup whole grain cereal  4-6 crackers such as Triscuits      Please refer to the pharmacy insert for more information on side-effects. Please call the clinic should you have more questions regarding this medication.     For any questions or concerns please send a [employer] message to our team or call our weight management call center at [phone] during regular business hours. For questions during evenings or weekends your messages will be addressed during the next business day.  For emergencies please call 911 or seek immediate medical care.      In order to get refills of this or any medication we prescribe you must be seen in the medical weight mgmt clinic every 2-3 months. Please have your pharmacy fax a refill request to [phone].

## 2025-03-10 NOTE — PROGRESS NOTES
Colon and Rectal Surgery Clinic Note    RE: Margo Chris  : 1965  RAUL: 3/12/2025     Reason for visit: rectal prolapse    HPI: Margo Chris is a 60 year old female with chronic constipation since childhood. She was following with the GI clinic at [place] and referred to Urogynecology for pelvic floor dysfunction. She established with Dr. Pierce on 24 who referred her for a defecography. This revealed a rectal intussusception that progressed to a full thickness rectal prolapse. There was also vaginal descent and evidence of cystocele.     Relevant PSH: laparoscopic assisted vaginal hysterectomy (2018)    Rectal prolapse: With  every  defecation though not external. Can occur without defecation - No. Duration of prolapse: Difficulty evacuating since    Bowel movement frequency: Every other day although incomplete. Bowel regimen:  none currently. Previously tried Miralax but ineffective.    ODS symptoms (straining, incomplete evacuation, return to toilet frequently): Yes: straining, incomplete evacuation, returning to toilet    FI Symptoms (urgency, urge FI, postevacuation seepage, mucus discharge): No    Urogyn symptoms (vaginal pressure/fullness, UUI or RICARDO, incomplete bladder emptying, dyspareunia): Yes: vaginal pressure, incomplete evacuation    Risk factors for recurrence:  1) Pregnancies:   2) Vaginal deliveries: 2.  First with vaccuum, both with episiotomy  3) Chronic straining ? Yes: longstanding constipation, significant straining with BMs  4) Previous PFPT/biofeedback: No  5) Hypermobility signs? No  6) Family history of prolapse: No  7) Smoker? No  8) BMI: 25. Follows with MW and has lost ~35 lb total. Currently maintaining.    Colonoscopy:  No prior colonoscopy.  Was scheduled for one last summer but had to cancel because she didn't have a ride    XR Defecography (24)  Ability to retain contrast: No, rectal incontinence with straining.      Organ configuration and  position at rest (relative to sacrum/coccyx/pubic bone): Abnormal descent of the anorectal junction measuring 7.6 cm below the pubococcygeal line.. Anorectal angle was in the range of 90 degrees.     Squeeze and strain: Anorectal angle decreased with maximal squeezing/retention. Anorectal angle increased appropriately with straining. No incontinence with straining.     Defecation/evacuation:  Initiation of evacuation was effortless and accomplished with abnormal descent of pelvic floor.  Evacuation was complete. There was not paradoxical contraction of sphincter during evacuation. Anorectal angle increases with evacuation.     Intussusception: Yes. Intussusception involving the rectum and rectocele into the anal canal demonstrated progressing to prolapse.     Prolapse: Yes     Rectocele: Small anterior rectocele with defecation. No retained contrast within the anterior rectocele at the end of defecation.     Vaginal [place]: Normal position of the vaginal apex at rest. Abnormal vaginal descent with defecation measuring 4.0 cm below the pubococcygeal line. There is bowing of the vaginal canal with defecation, presumably due to mass effect from the bladder, suggesting cystocele. Spillage of contrast with straining/defecation.     Extra rectal structures (if visible): None     Other findings: None.                                                                   IMPRESSION:  1.  Moderate abnormal rectal descent at rest.  2.  Rectal prolapse.  3.  Complete evacuation. Mild rectal incontinence with straining.  4.  Small anterior rectocele with defecation without retained contrast at the end of defecation.  5.  Vaginal descent during defecation. Findings suggestive of cystocele.    Medical history:  Past Medical History:   Diagnosis Date    Chronic depression     Gilbert's syndrome     Obesity, unspecified     Other and unspecified hyperlipidemia        Surgical history:  Past Surgical History:   Procedure Laterality Date     HC TOOTH EXTRACTION W/FORCEP      LAPAROSCOPIC ASSISTED HYSTERECTOMY VAGINAL N/A 2/16/2018    Procedure: LAPAROSCOPIC ASSISTED HYSTERECTOMY VAGINAL;  LAPAROSCOPIC ASSISTED VAGINAL HYSTERECTOMY BILATERAL SALPINGECTOMY ;  Surgeon: Hiwot Tim MD;  Location: [facility]    LAPAROSCOPIC SALPINGECTOMY Bilateral 2/16/2018    Procedure: LAPAROSCOPIC SALPINGECTOMY;;  Surgeon: Hiwot Tim MD;  Location: [facility]       Problem list:    Patient Active Problem List    Diagnosis Date Noted    Hx of obesity 01/13/2025     Priority: Medium    Overweight (BMI 25.0-29.9) 01/13/2025     Priority: Medium    S/P vaginal hysterectomy 02/16/2018     Priority: Medium    Chronic fatigue syndrome      Priority: Medium    Urticaria 03/09/2010     Priority: Medium    Subclinical hypothyroidism 09/10/2009     Priority: Medium    Hyperlipidemia 07/23/2009     Priority: Medium     Problem list name updated by automated process. Provider to review      Abnormal weight gain 07/23/2009     Priority: Medium    Family history of diabetes mellitus 07/23/2009     Priority: Medium    Gilbert syndrome 07/23/2009     Priority: Medium       Medications:  Current Outpatient Medications   Medication Sig Dispense Refill    buPROPion (WELLBUTRIN XL) 300 MG 24 hr tablet Take 1 tablet (300 mg) by mouth every morning. 90 tablet 5    metFORMIN (GLUCOPHAGE XR) 500 MG 24 hr tablet 1 tablet by mouth daily with meal for 1 week, then 2 tablets daily with meal. 60 tablet 3    Multiple Vitamins-Minerals (MULTIVITAMIN ADULT) CHEW Take 2 chew tab by mouth daily         Allergies:  Allergies   Allergen Reactions    Food      Apricot, mangos, peaches    Sulfa Antibiotics Rash       Family history:  Family History   Problem Relation Age of Onset    Hypertension Mother     Diabetes Father     Hypertension Father     Lung Cancer Father     Melanoma Father     Skin Cancer Father        Social history:  Social History     Tobacco Use    Smoking status: Never    Smokeless  tobacco: Never   Substance Use Topics    Alcohol use: No    Marital status: .    There are no exam notes on file for this visit.     Physical Examination:  LMP 01/26/2018 (Approximate)   General: alert, oriented, in no acute distress, sitting comfortably  Respiratory: non-labored breathing on RA  Abdomen: soft, nontender, nondistended    Chaperone: Sandra Kirby MA   Perianal External Examination:  Perianal skin: intact.  Lesions: No.  Eversion of buttocks: There was not evidence of an anal fissure. Details: N/A.  Skin tags or external hemorrhoids: Yes: mild.    Digital Rectal Examination: Was performed.   Sphincter tone: Fair.  Normal relaxation with Valsalva.  Palpable lesions: No.  Other: A small  rectocele was appreciated on bearing down as well as a very prominent internal prolapse  Bimanual examination: was not performed.        Assessment:   This is a 60 year old female with ODS, deep rectoanal intussusception on defecography.  We reviewed role of robotic ventral rectopexy as part of pelvic organ prolapse surgery.  We reviewed procedure, recovery, risks and benefits, including a 1-3 % risk of mesh related complications.    Risks, benefits, and alternatives of operative treatment were thoroughly discussed with the patient. she understands these well and agrees to proceed.    All pertinent labs and imaging were personally reviewed by me.    Plan:  - Colonoscopy   - Robotic VMR with Dr. Kari Ceron MD     Division of Colon & Rectal Surgery  [facility]       The Division of Colon and Rectal Surgery at [facility] is committed to advancing discovery and innovation in human health through research. Margo Chris is willing to be contacted by our research team if she qualify for any future research studies: N/A    I spent a total of 45 minutes on the day of the visit.  Time spent on date of encounter doing chart review, history and exam,  documentation, and further activities in this note.      This note was created using speech recognition software and may contain unintended word substitutions.    Referring provider:  Flaquita Pierce MD  [address] [facility] [address]  [place] [address]     Primary Care Provider:  No Ref-Primary, Physician

## 2025-03-12 ENCOUNTER — PRE VISIT (OUTPATIENT)
Dept: SURGERY | Facility: CLINIC | Age: 60
End: 2025-03-12

## 2025-03-12 ENCOUNTER — TELEPHONE (OUTPATIENT)
Dept: GASTROENTEROLOGY | Facility: CLINIC | Age: 60
End: 2025-03-12

## 2025-03-12 ENCOUNTER — OFFICE VISIT (OUTPATIENT)
Dept: SURGERY | Facility: CLINIC | Age: 60
End: 2025-03-12
Payer: COMMERCIAL

## 2025-03-12 VITALS
HEART RATE: 67 BPM | BODY MASS INDEX: 25.46 KG/M2 | HEIGHT: 60 IN | DIASTOLIC BLOOD PRESSURE: 82 MMHG | WEIGHT: 129.7 LBS | SYSTOLIC BLOOD PRESSURE: 146 MMHG | OXYGEN SATURATION: 99 %

## 2025-03-12 DIAGNOSIS — R19.8 ABNORMAL DEFECATION: ICD-10-CM

## 2025-03-12 DIAGNOSIS — N81.9 VAGINAL VAULT PROLAPSE: ICD-10-CM

## 2025-03-12 DIAGNOSIS — M62.89 PELVIC FLOOR DYSFUNCTION: ICD-10-CM

## 2025-03-12 DIAGNOSIS — M79.18 MYALGIA OF PELVIC FLOOR: ICD-10-CM

## 2025-03-12 DIAGNOSIS — K59.09 CHRONIC CONSTIPATION: ICD-10-CM

## 2025-03-12 DIAGNOSIS — K62.3 RECTAL PROLAPSE: Primary | ICD-10-CM

## 2025-03-12 RX ORDER — MAGNESIUM CARB/ALUMINUM HYDROX 105-160MG
TABLET,CHEWABLE ORAL
Qty: 296 ML | Refills: 0 | Status: SHIPPED | OUTPATIENT
Start: 2025-03-12

## 2025-03-12 RX ORDER — POLYETHYLENE GLYCOL 3350 17 G/17G
POWDER, FOR SOLUTION ORAL
Qty: 238 G | Refills: 0 | Status: SHIPPED | OUTPATIENT
Start: 2025-03-12

## 2025-03-12 ASSESSMENT — PAIN SCALES - GENERAL: PAINLEVEL_OUTOF10: NO PAIN (0)

## 2025-03-12 NOTE — LETTER
3/12/2025       RE: Margo Chris  [address] [place] [address]     Dear Colleague,    Thank you for referring your patient, Margo Chris, to the [facility] [place] at [facility]. Please see a copy of my visit note below.    Colon and Rectal Surgery Clinic Note    RE: Margo Chris  : 1965  RAUL: 3/12/2025     Reason for visit: rectal prolapse    HPI: Margo Chris is a 60 year old female with chronic constipation since childhood. She was following with the GI clinic at [place] and referred to Urogynecology for pelvic floor dysfunction. She established with Dr. Pierce on 24 who referred her for a defecography. This revealed a rectal intussusception that progressed to a full thickness rectal prolapse. There was also vaginal descent and evidence of cystocele.     Relevant PSH: laparoscopic assisted vaginal hysterectomy (2018)    Rectal prolapse: With  every  defecation though not external. Can occur without defecation - No. Duration of prolapse: Difficulty evacuating since    Bowel movement frequency: Every other day although incomplete. Bowel regimen:  none currently. Previously tried Miralax but ineffective.    ODS symptoms (straining, incomplete evacuation, return to toilet frequently): Yes: straining, incomplete evacuation, returning to toilet    FI Symptoms (urgency, urge FI, postevacuation seepage, mucus discharge): No    Urogyn symptoms (vaginal pressure/fullness, UUI or RICARDO, incomplete bladder emptying, dyspareunia): Yes: vaginal pressure, incomplete evacuation    Risk factors for recurrence:  1) Pregnancies:   2) Vaginal deliveries: 2.  First with vaccuum, both with episiotomy  3) Chronic straining ? Yes: longstanding constipation, significant straining with BMs  4) Previous PFPT/biofeedback: No  5) Hypermobility signs? No  6) Family history of prolapse: No  7)  Smoker? No  8) BMI: 25. Follows with [facility] and has lost ~35 lb total. Currently maintaining.    Colonoscopy:  No prior colonoscopy.  Was scheduled for one last summer but had to cancel because she didn't have a ride    XR Defecography (12/12/24)  Ability to retain contrast: No, rectal incontinence with straining.      Organ configuration and position at rest (relative to sacrum/coccyx/pubic bone): Abnormal descent of the anorectal junction measuring 7.6 cm below the pubococcygeal line.. Anorectal angle was in the range of 90 degrees.     Squeeze and strain: Anorectal angle decreased with maximal squeezing/retention. Anorectal angle increased appropriately with straining. No incontinence with straining.     Defecation/evacuation:  Initiation of evacuation was effortless and accomplished with abnormal descent of pelvic floor.  Evacuation was complete. There was not paradoxical contraction of sphincter during evacuation. Anorectal angle increases with evacuation.     Intussusception: Yes. Intussusception involving the rectum and rectocele into the anal canal demonstrated progressing to prolapse.     Prolapse: Yes     Rectocele: Small anterior rectocele with defecation. No retained contrast within the anterior rectocele at the end of defecation.     Vaginal [place]: Normal position of the vaginal apex at rest. Abnormal vaginal descent with defecation measuring 4.0 cm below the pubococcygeal line. There is bowing of the vaginal canal with defecation, presumably due to mass effect from the bladder, suggesting cystocele. Spillage of contrast with straining/defecation.     Extra rectal structures (if visible): None     Other findings: None.                                                                   IMPRESSION:  1.  Moderate abnormal rectal descent at rest.  2.  Rectal prolapse.  3.  Complete evacuation. Mild rectal incontinence with straining.  4.  Small anterior rectocele with defecation without retained contrast at the  end of defecation.  5.  Vaginal descent during defecation. Findings suggestive of cystocele.    Medical history:  Past Medical History:   Diagnosis Date     Chronic depression      Gilbert's syndrome      Obesity, unspecified      Other and unspecified hyperlipidemia        Surgical history:  Past Surgical History:   Procedure Laterality Date     HC TOOTH EXTRACTION W/FORCEP       LAPAROSCOPIC ASSISTED HYSTERECTOMY VAGINAL N/A 2/16/2018    Procedure: LAPAROSCOPIC ASSISTED HYSTERECTOMY VAGINAL;  LAPAROSCOPIC ASSISTED VAGINAL HYSTERECTOMY BILATERAL SALPINGECTOMY ;  Surgeon: Hiwot Tim MD;  Location: [facility]     LAPAROSCOPIC SALPINGECTOMY Bilateral 2/16/2018    Procedure: LAPAROSCOPIC SALPINGECTOMY;;  Surgeon: Hiwot Tim MD;  Location: [facility]       Problem list:    Patient Active Problem List    Diagnosis Date Noted     Hx of obesity 01/13/2025     Priority: Medium     Overweight (BMI 25.0-29.9) 01/13/2025     Priority: Medium     S/P vaginal hysterectomy 02/16/2018     Priority: Medium     Chronic fatigue syndrome      Priority: Medium     Urticaria 03/09/2010     Priority: Medium     Subclinical hypothyroidism 09/10/2009     Priority: Medium     Hyperlipidemia 07/23/2009     Priority: Medium     Problem list name updated by automated process. Provider to review       Abnormal weight gain 07/23/2009     Priority: Medium     Family history of diabetes mellitus 07/23/2009     Priority: Medium     Gilbert syndrome 07/23/2009     Priority: Medium       Medications:  Current Outpatient Medications   Medication Sig Dispense Refill     buPROPion (WELLBUTRIN XL) 300 MG 24 hr tablet Take 1 tablet (300 mg) by mouth every morning. 90 tablet 5     metFORMIN (GLUCOPHAGE XR) 500 MG 24 hr tablet 1 tablet by mouth daily with meal for 1 week, then 2 tablets daily with meal. 60 tablet 3     Multiple Vitamins-Minerals (MULTIVITAMIN ADULT) CHEW Take 2 chew tab by mouth daily         Allergies:  Allergies   Allergen Reactions      Food      Apricot, mangos, peaches     Sulfa Antibiotics Rash       Family history:  Family History   Problem Relation Age of Onset     Hypertension Mother      Diabetes Father      Hypertension Father      Lung Cancer Father      Melanoma Father      Skin Cancer Father        Social history:  Social History     Tobacco Use     Smoking status: Never     Smokeless tobacco: Never   Substance Use Topics     Alcohol use: No    Marital status: .    There are no exam notes on file for this visit.     Physical Examination:  LMP 01/26/2018 (Approximate)   General: alert, oriented, in no acute distress, sitting comfortably  Respiratory: non-labored breathing on RA  Abdomen: soft, nontender, nondistended    Chaperone: Sandra Kirby MA   Perianal External Examination:  Perianal skin: intact.  Lesions: No.  Eversion of buttocks: There was not evidence of an anal fissure. Details: N/A.  Skin tags or external hemorrhoids: Yes: mild.    Digital Rectal Examination: Was performed.   Sphincter tone: Fair.  Normal relaxation with Valsalva.  Palpable lesions: No.  Other: A small  rectocele was appreciated on bearing down as well as a very prominent internal prolapse  Bimanual examination: was not performed.        Assessment:   This is a 60 year old female with ODS, deep rectoanal intussusception on defecography.  We reviewed role of robotic ventral rectopexy as part of pelvic organ prolapse surgery.  We reviewed procedure, recovery, risks and benefits, including a 1-3 % risk of mesh related complications.    Risks, benefits, and alternatives of operative treatment were thoroughly discussed with the patient. she understands these well and agrees to proceed.    All pertinent labs and imaging were personally reviewed by me.    Plan:  - Colonoscopy   - Robotic VMR with Dr. Kari Ceron MD     Division of Colon & Rectal Surgery  [facility]       The Division of Colon and Rectal Surgery  at [facility] is committed to advancing discovery and innovation in human health through research. Margo Chris is willing to be contacted by our research team if she qualify for any future research studies: N/A    I spent a total of 45 minutes on the day of the visit.  Time spent on date of encounter doing chart review, history and exam, documentation, and further activities in this note.      This note was created using speech recognition software and may contain unintended word substitutions.    Referring provider:  Flaquita Pierce MD  [address] [place] [address] [address]  [place] [address]     Primary Care Provider:  No Ref-Primary, Physician       Again, thank you for allowing me to participate in the care of your patient.      Sincerely,    Tanvi Ceron MD

## 2025-03-12 NOTE — PATIENT INSTRUCTIONS
Follow up:    Scheduling will give you a call within three business days to schedule surgery    Appointment you will need in prep: pre op physical with our anesthesia team and blood work  You will also need to complete a colonoscopy before surgery. Endoscopy will call you to schedule this.   You will need to do a full bowel prep the day before surgery. I have sent these to your pharmacy. You will receive a surgery packet through [employer] and mail with the instructions.     You will be undergoing a large operation. In preparation for your surgery we ask that you focus on a healthy lifestyle to help in the aid of your recovery and to reduce your post surgical complications. Below are a few guidelines to follow:    Schedule an appointment with your primary care physician to discuss how to best improve your overall health condition   If you have diabetes, please visit with your provider to optimize your blood sugar control   Engage in 30 minutes of exercise 5x a week or to the best of your ability. This can be in the form of walking, cycling, weight lifting, running or swimming  Focus on a healthy diet, high in fiber and protein  Vegetables and fruit at every meal   Lean proteins such as fish and chicken   Please start drinking protein shakes now till the surgery. Drink 1-2 protein shakes per day. Each shake should have 25-30 grams of protein   Drink at least 64 ounces of water daily   Avoid alcohol and excessive caffeine   Stop smoking if you are currently smoking     PRISCILLA Pereyra [phone]    Clinic Fax Number [phone]    Surgery Scheduling [phone]    My Chart is available 24 hours a day and is a secure way to access your records and communicate with your care team.  I strongly recommend signing up if you haven't already done so, if you are comfortable with computers.  If you would like to inquire about this or are having problems with My Chart access, you may call [phone] or go online at  [email or web address].  Please allow at least 24 hours for a response and extra time on weekends and Holidays.

## 2025-03-12 NOTE — TELEPHONE ENCOUNTER
"Endoscopy Scheduling Screen    Have you had any respiratory illness or flu-like symptoms in the last 10 days?  No    What is your communication preference for Instructions and/or Bowel Prep?   MyChart    What insurance is in the chart?  Other:      Ordering/Referring Provider: JEVON SETHI   (If ordering provider performs procedure, schedule with ordering provider unless otherwise instructed. )    BMI: Estimated body mass index is 25.33 kg/m  as calculated from the following:    Height as of an earlier encounter on 3/12/25: 1.524 m (5').    Weight as of an earlier encounter on 3/12/25: 58.8 kg (129 lb 11.2 oz).     Sedation Ordered  moderate sedation.   If patient BMI > 50 do not schedule in [facility].    If patient BMI > 45 do not schedule at [facility].    Are you taking methadone or Suboxone?  NO, No RN review required.    Have you been diagnosed and are being treated for severe PTSD or severe anxiety?  NO, No RN review required.    Are you taking any prescription medications for pain 3 or more times per week?   NO, No RN review required.    Do you have a history of malignant hyperthermia?  No    (Females) Are you currently pregnant?   No     Have you been diagnosed or told you have pulmonary hypertension?   No    Do you have an LVAD?  No    Have you been told you have moderate to severe sleep apnea?  No.    Have you been told you have COPD, asthma, or any other lung disease?  No    Has your doctor ordered any cardiac tests like echo, angiogram, stress test, ablation, or EKG, that you have not completed yet?  No    Do you  have a history of any heart conditions?  No     Have you ever had or are you waiting for an organ transplant?  No. Continue scheduling, no site restrictions.    Have you had a stroke or transient ischemic attack (TIA aka \"mini stroke\") in the last 2 years?   No.    Have you been diagnosed with or been told you have cirrhosis of the liver?   No.    Are you currently on dialysis?   No    Do you need " assistance transferring?   No    BMI: Estimated body mass index is 25.33 kg/m  as calculated from the following:    Height as of an earlier encounter on 3/12/25: 1.524 m (5').    Weight as of an earlier encounter on 3/12/25: 58.8 kg (129 lb 11.2 oz).     Is patients BMI > 40 and scheduling location [facility]?  No    Do you take an injectable or oral medication for weight loss or diabetes (excluding insulin)?  No    Do you take the medication Naltrexone?  No    Do you take blood thinners?  No       Prep   Are you currently on dialysis or do you have chronic kidney disease?  No    Do you have a diagnosis of diabetes?  No    Do you have a diagnosis of cystic fibrosis (CF)?  No    On a regular basis do you go 3 -5 days between bowel movements?  No    BMI > 40?  No    Preferred Pharmacy:    [id number] - [place], [place] - [address]  [address] [place] [address]  Phone: [phone] Fax: [phone]      Final Scheduling Details     Procedure scheduled  Colonoscopy    Surgeon:  TRICIA     Date of procedure:  3/27/25     Pre-OP / PAC:   No - Not required for this site.    Location  [facility] - Patient preference.    Sedation   Moderate Sedation - Per order.      Patient Reminders:   You will receive a call from a Nurse to review instructions and health history.  This assessment must be completed prior to your procedure.  Failure to complete the Nurse assessment may result in the procedure being cancelled.      On the day of your procedure, please designate an adult(s) who can drive you home stay with you for the next 24 hours. The medicines used in the exam will make you sleepy. You will not be able to drive.      You cannot take public transportation, ride share services, or non-medical taxi service without a responsible caregiver.  Medical transport services are allowed with the requirement that a responsible caregiver will receive you at your destination.  We require that drivers and caregivers are  confirmed prior to your procedure.

## 2025-03-12 NOTE — NURSING NOTE
Chief Complaint   Patient presents with    Consult     Pelvic Floor Dysfunction       Vitals:    03/12/25 1040   BP: (!) 146/82   BP Location: Left arm   Patient Position: Sitting   Cuff Size: Adult Regular   Pulse: 67   SpO2: 99%   Weight: 129 lb 11.2 oz   Height: 5'       Body mass index is 25.33 kg/m .    Sandra Heard CMA

## 2025-03-13 ENCOUNTER — TELEPHONE (OUTPATIENT)
Dept: GASTROENTEROLOGY | Facility: CLINIC | Age: 60
End: 2025-03-13
Payer: COMMERCIAL

## 2025-03-13 DIAGNOSIS — Z12.11 ENCOUNTER FOR SCREENING COLONOSCOPY: Primary | ICD-10-CM

## 2025-03-13 RX ORDER — BISACODYL 5 MG/1
TABLET, DELAYED RELEASE ORAL
Qty: 4 TABLET | Refills: 0 | Status: SHIPPED | OUTPATIENT
Start: 2025-03-13

## 2025-03-13 NOTE — TELEPHONE ENCOUNTER
Pre assessment completed for upcoming procedure.   (Please see previous telephone encounter notes for complete details)    Procedure details:    Arrival time and facility location reviewed.    Pre op exam needed? No.    Designated  policy reviewed. Instructed to have someone stay 6  hours post procedure.     Medication review:    Oral diabetic medication(s): Metformin (glucophage): HOLD day of procedure.The patient states she is weaning off Metformin and will be done with it before the procedure.    Prep for procedure:     Procedure prep instructions reviewed. The patient is declining to take the Dulcolax part of the bowel prep. States that she does not want to feel sick and she doesn't feel that it will be beneficial for her. Explained to the patient she may not get an adequate colon prep but she can try it.     Any additional information needed:  N/A    Patient verbalized understanding and had no questions or concerns at this time.      Janneth Cyr LPN  Endoscopy Procedure Pre Assessment   [phone] option 3

## 2025-03-13 NOTE — TELEPHONE ENCOUNTER
Rescheduled Colonoscopy  Due to no ride.    Pre visit planning completed.      Procedure details:    Patient scheduled for Colonoscopy on 3/27/25.     Arrival time: 1015. Procedure time 1100    Facility location: [facility]; [facility] [place] [address]. Check in location: 1st Floor [facility].     Sedation type: Conscious sedation     Pre op exam needed? No.    Indication for procedure: screening, rectal prolapse      Chart review:     Electronic implanted devices? No    Recent diagnosis of diverticulitis within the last 6 weeks? No      Medication review:    Diabetic? No    Anticoagulants? No    Weight loss medication/injectable? Yes. Metformin: HOLD day of procedure.    Other medication HOLDING recommendations:  N/A      Prep for procedure:     Bowel prep recommendation: Extended Golytely. Bowel prep sent to [id number] - [place], [place] - [address]  Due to: constipation noted or reported    Prep instructions sent via [employer]         Violeta Mcintyre RN  Endoscopy Procedure Pre Assessment   [phone] option 3

## 2025-03-17 ENCOUNTER — TELEPHONE (OUTPATIENT)
Dept: SURGERY | Facility: CLINIC | Age: 60
End: 2025-03-17
Payer: COMMERCIAL

## 2025-03-17 NOTE — TELEPHONE ENCOUNTER
Voicemail message received from patient requesting a return call to schedule robotic combo surgery with Dr. Ceron and Dr. Pierce.    Message routed to Dr. Ceron's .    Adriana jennings on 3/17/2025 at 1:45 PM

## 2025-03-18 ENCOUNTER — DOCUMENTATION ONLY (OUTPATIENT)
Dept: UROLOGY | Facility: CLINIC | Age: 60
End: 2025-03-18
Payer: COMMERCIAL

## 2025-03-18 DIAGNOSIS — R11.0 NAUSEA: Primary | ICD-10-CM

## 2025-03-18 RX ORDER — ONDANSETRON 4 MG/1
4 TABLET, FILM COATED ORAL EVERY 8 HOURS PRN
Qty: 5 TABLET | Refills: 0 | Status: SHIPPED | OUTPATIENT
Start: 2025-03-18

## 2025-03-18 NOTE — TELEPHONE ENCOUNTER
Called patient to discuss surgery dates in May with Dr. Ceron & Dr. Pierce    Spoke with: Patient     Tentative Date of Surgery: 05/19/2025    Estimated Arrival time Discussed with Patient:  No - likely early morning     Location of surgery: [facility] OR     Pre-Op H&P: PAC - Needs scheduled/Awaiting Kari appt    WOC: No     Labs: Yes - Needs scheduled/Awaiting Kari appt    Imaging: No     Post-Op Appt Date w/ NP/PA:  Krystal Myrick, APRN, CNP 6/3 at 1PM    Post-Op Appt Date w/ Surgeon:  Dr. Ceron 6/26 at 11:45AM - [facility]     Bowel Prep:  Yes  Full Prep WITHOUT antibiotics - to be sent once surgery is scheduled     Discussed with patient pre-op RN will call 2-3 days prior to surgery with arrival time and instructions:  Yes       Standard Surgery Packet Sent: No - to be sent once surgery is scheduled      Additional Comments: Offered additional surgery date of 5/5.     Patient is aware she will need to see Dr. Pierce before surgery can be scheduled. Patient states she was told that Dr. Pierce's RN will be reaching out to get her scheduled for that appt. Patient would like to have appt w/ Dr. Pierce and appt w/ PAC on the same day.     Patient requested to schedule post-op appts now rather than wait to schedule.       All patients questions were answered and was instructed to review surgical packet and call back with any questions or concerns.       Ashlyn Jorge on 3/18/2025 at 9:07 AM

## 2025-03-18 NOTE — PROGRESS NOTES
Call placed to patient to let her know why Dr Pierce needs to see her again and that Simi SNE[facility] RNCC will be in touch about this and the PAC visit. Patient voices understanding.      Thank you,  Sandra Colon RN, BSN   Urology Triage Nurse

## 2025-03-19 ENCOUNTER — TELEPHONE (OUTPATIENT)
Dept: UROLOGY | Facility: CLINIC | Age: 60
End: 2025-03-19
Payer: COMMERCIAL

## 2025-03-19 NOTE — TELEPHONE ENCOUNTER
Notified the patient this writer sent emailed the forms as she requested   Prolapse form and Augs forms     Simi Hughes RN, BSN, PHN  Care Coordinator Urology  [facility] [place]  Urology [facility]  [phone]

## 2025-03-19 NOTE — TELEPHONE ENCOUNTER
----- Message from Keri LANGE sent at 3/17/2025 12:45 PM CDT -----  Regarding: FW: Kari + Osmany Combo  Hello,     Please schedule this patient for a clinic visit with Dr. Pierce.   Pt has seen colo-rectal and needs to follow up.   Looking to schedule a combo with Dr. Ceron.     Keri  ----- Message -----  From: Ashlyn Jorge  Sent: 3/17/2025  12:05 PM CDT  To: Keri Gonzalez  Subject: Kari + Osmany Combo                              Mustapha Saavedra,     This patient is going to be a combo with Dr. Ceron and Dr. Pierce - patient needs to see Dr. Pierce again prior to surgery and does not have that appt yet. Looking to schedule surgery for 5/9.     Thanks  Adelso

## 2025-03-20 NOTE — TELEPHONE ENCOUNTER
Left message for patient regarding scheduling PAC and Lab appts.     Direct call back number given  Ph: [phone]      Ashlyn Jorge on 3/20/2025 at 10:19 AM

## 2025-03-20 NOTE — TELEPHONE ENCOUNTER
Called and spoke to patient to schedule PAC and Lab appts for tentatively scheduled surgery with Dr. Ceron and Dr. Pierce.     Patient requesting more information on Dr. Ceron's portion of the surgery - routed to RN.     Call dropped prior to scheduling appts.       Ashlyn Jorge on 3/20/2025 at 10:18 AM

## 2025-03-20 NOTE — TELEPHONE ENCOUNTER
Patient returned call to schedule PAC and Labs.     PAC 5/1 at 11AM   Labs 5/1 at 12:15PM    Ashlyn Jorge on 3/20/2025 at 12:05 PM

## 2025-03-27 ENCOUNTER — HOSPITAL ENCOUNTER (OUTPATIENT)
Facility: CLINIC | Age: 60
Discharge: HOME | End: 2025-03-27
Attending: STUDENT IN AN ORGANIZED HEALTH CARE EDUCATION/TRAINING PROGRAM | Admitting: STUDENT IN AN ORGANIZED HEALTH CARE EDUCATION/TRAINING PROGRAM
Payer: COMMERCIAL

## 2025-03-27 VITALS
DIASTOLIC BLOOD PRESSURE: 77 MMHG | OXYGEN SATURATION: 99 % | HEIGHT: 60 IN | BODY MASS INDEX: 25.13 KG/M2 | WEIGHT: 128 LBS | RESPIRATION RATE: 13 BRPM | HEART RATE: 59 BPM | SYSTOLIC BLOOD PRESSURE: 123 MMHG

## 2025-03-27 LAB — COLONOSCOPY: NORMAL

## 2025-03-27 PROCEDURE — 99153 MOD SED SAME PHYS/QHP EA: CPT | Performed by: STUDENT IN AN ORGANIZED HEALTH CARE EDUCATION/TRAINING PROGRAM

## 2025-03-27 PROCEDURE — 250N000011 HC RX IP 250 OP 636: Performed by: STUDENT IN AN ORGANIZED HEALTH CARE EDUCATION/TRAINING PROGRAM

## 2025-03-27 PROCEDURE — G0121 COLON CA SCRN NOT HI RSK IND: HCPCS | Performed by: STUDENT IN AN ORGANIZED HEALTH CARE EDUCATION/TRAINING PROGRAM

## 2025-03-27 PROCEDURE — G0500 MOD SEDAT ENDO SERVICE >5YRS: HCPCS | Performed by: STUDENT IN AN ORGANIZED HEALTH CARE EDUCATION/TRAINING PROGRAM

## 2025-03-27 PROCEDURE — 45378 DIAGNOSTIC COLONOSCOPY: CPT | Performed by: STUDENT IN AN ORGANIZED HEALTH CARE EDUCATION/TRAINING PROGRAM

## 2025-03-27 RX ORDER — FENTANYL CITRATE 50 UG/ML
INJECTION, SOLUTION INTRAMUSCULAR; INTRAVENOUS PRN
Status: DISCONTINUED | OUTPATIENT
Start: 2025-03-27 | End: 2025-03-27 | Stop reason: HOSPADM

## 2025-03-27 ASSESSMENT — ACTIVITIES OF DAILY LIVING (ADL)
ADLS_ACUITY_SCORE: 42

## 2025-03-27 NOTE — TELEPHONE ENCOUNTER
FUTURE VISIT INFORMATION      SURGERY INFORMATION:  Date: 5/19/25  Location:  OR  Surgeon:  Tanvi Ceron MD   Anesthesia Type:  General   Procedure: ROBOTIC VENTRAL MESH RECTOPEXY  Consult: 3/12/25    RECORDS REQUESTED FROM:       Primary Care Provider: No PCP    Pertinent Medical History: Hyperlipidemia; Gilbert syndrome; Subclinical hypothyroidism

## 2025-03-27 NOTE — H&P
Endoscopy H&P    Margo Chris  [id number]  female  60 year old      Reason for procedure/surgery: Screening colonoscopy. None prior. No family history of colorectal cancer. She has history of obstructive defecation syndrome and pelvic organ prolapse with plan for ventral mesh rectopexy with Dr. Ceron and Dr. Pierce in the coming months.     Patient Active Problem List   Diagnosis    Chronic fatigue syndrome    Hyperlipidemia    S/P vaginal hysterectomy    Abnormal weight gain    Family history of diabetes mellitus    Gilbert syndrome    Subclinical hypothyroidism    Urticaria    Hx of obesity    Overweight (BMI 25.0-29.9)       Past Surgical History:    Past Surgical History:   Procedure Laterality Date    HC TOOTH EXTRACTION W/FORCEP      LAPAROSCOPIC ASSISTED HYSTERECTOMY VAGINAL N/A 2/16/2018    Procedure: LAPAROSCOPIC ASSISTED HYSTERECTOMY VAGINAL;  LAPAROSCOPIC ASSISTED VAGINAL HYSTERECTOMY BILATERAL SALPINGECTOMY ;  Surgeon: Hiwot Tim MD;  Location: [facility]    LAPAROSCOPIC SALPINGECTOMY Bilateral 2/16/2018    Procedure: LAPAROSCOPIC SALPINGECTOMY;;  Surgeon: Hiwot Tim MD;  Location: [facility]       Past Medical History:   Past Medical History:   Diagnosis Date    Chronic depression     Gilbert's syndrome     Obesity, unspecified     Other and unspecified hyperlipidemia        Social History:   Social History     Tobacco Use    Smoking status: Never    Smokeless tobacco: Never   Substance Use Topics    Alcohol use: Yes       Family History:   Family History   Problem Relation Age of Onset    Hypertension Mother     Diabetes Father     Hypertension Father     Lung Cancer Father     Melanoma Father     Skin Cancer Father        Allergies:   Allergies   Allergen Reactions    Food      Apricot, mangos, peaches    Sulfa Antibiotics Rash       Active Medications:   No current outpatient medications on file.       Systemic Review:   CONSTITUTIONAL: NEGATIVE for fever, chills, change in  weight  ENT/MOUTH: NEGATIVE for ear, mouth and throat problems  RESP: NEGATIVE for significant cough or SOB  CV: NEGATIVE for chest pain, palpitations or peripheral edema    Physical Examination:   Vital Signs: BP (!) 168/85   Pulse 69   Resp 16   Ht 1.524 m (5')   Wt 58.1 kg (128 lb)   LMP 01/26/2018 (Approximate)   SpO2 100%   BMI 25.00 kg/m    GENERAL: healthy, alert and no distress  NECK: no adenopathy, no asymmetry, masses, or scars  RESP: lungs clear to auscultation - no rales, rhonchi or wheezes  CV: regular rate and rhythm, normal S1 S2, no S3 or S4, no murmur, click or rub, no peripheral edema and peripheral pulses strong  ABDOMEN: soft, nontender, no hepatosplenomegaly, no masses and bowel sounds normal  MS: no gross musculoskeletal defects noted, no edema    Plan: Appropriate to proceed as scheduled.      Debora Hickey MD  3/27/2025    PCP:  No Ref-Primary, Physician

## 2025-04-07 ENCOUNTER — PRE VISIT (OUTPATIENT)
Dept: UROLOGY | Facility: CLINIC | Age: 60
End: 2025-04-07
Payer: COMMERCIAL

## 2025-04-07 NOTE — TELEPHONE ENCOUNTER
Reason for visit: Surgery discussion     Relevant information: Last seen in December for abnormal defecation, vaginal vault prolapse, myalgia of pelvic floor/dysfunction. Referred to colorectal for combo surgery and now returning.     Records/imaging/labs/orders: all records available    Pt called: no need for a call    At Rooming: Mike Damico  4/7/2025  8:58 AM

## 2025-04-10 ENCOUNTER — VIRTUAL VISIT (OUTPATIENT)
Dept: UROLOGY | Facility: CLINIC | Age: 60
End: 2025-04-10
Payer: COMMERCIAL

## 2025-04-10 DIAGNOSIS — K62.3 RECTAL PROLAPSE: ICD-10-CM

## 2025-04-10 DIAGNOSIS — M62.89 PELVIC FLOOR DYSFUNCTION: ICD-10-CM

## 2025-04-10 DIAGNOSIS — Z90.710 S/P VAGINAL HYSTERECTOMY: ICD-10-CM

## 2025-04-10 DIAGNOSIS — N81.9 VAGINAL VAULT PROLAPSE: Primary | ICD-10-CM

## 2025-04-10 NOTE — NURSING NOTE
Current patient location: [address] [place] [address]    Is the patient currently in the state of [place]? YES    Visit mode: VIDEO    If the visit is dropped, the patient can be reconnected by:VIDEO VISIT: Text to cell phone:   Telephone Information:   Mobile [phone]       Will anyone else be joining the visit? NO  (If patient encounters technical issues they should call [phone]    Are changes needed to the allergy or medication list? No and Pt stated no med changes    Are refills needed on medications prescribed by this physician? NO    Rooming Documentation:  Not applicable    Reason for visit: SEAN SOLIS

## 2025-04-10 NOTE — LETTER
4/10/2025       RE: Margo Chris  [address] [place] [address]     Dear Colleague,    Thank you for referring your patient, Margo Chris, to the [facility]. Please see a copy of my visit note below.    Virtual Visit Details    Type of service:  Video Visit   12:46 PM  1:08PM  Originating Location (pt. Location): Home    Distant Location (provider location):  On-site  Platform used for Video Visit: [facility]    April 10, 2025    Margo was seen today for recheck.    Diagnoses and all orders for this visit:    Vaginal vault prolapse    Pelvic floor dysfunction    S/P vaginal hysterectomy    Rectal prolapse       We discussed the reasoning to do the combination case versus just the VMR.    We discussed recommendation of bilateral oophorectomy at time of the surgery if we are there    We spent a long time discussing the proposed combination surgery and how that differs if Dr Ceron were to only do the VMR.  We also discussed risks of surgery and mesh as it related to the 2011 FDA mesh warning and to pelvic surgeries    We discussed that the risks to the procedure include but not limited to cardiovascular risks of anesthesia, nerve injury, bleeding, infection, injury to adjacent organs including bowel, bladder, and blood vessels, conversion to open procedure, de rashaad or worsening stress incontinence or urge incontinence, need for post-operative ames catheter, need for further procedures including for mesh extrusion or erosion.     She has an appointment with Dr Ceron later today and will think about it.  Message sent to Dr Ceron    30 minutes were spent today on the day of the encounter in reviewing the EMR, direct patient care including surgical counseling, coordination of care and documentation    Flaquita Pierce MD MPH  (she/her/hers)   of Urology  [facility]  [place]    Subjective    Here today to discuss possible SCP.  She denies any changes in health since last visit    LMP 01/26/2018 (Approximate)   GENERAL: healthy, alert and no distress  EYES: Eyes grossly normal to inspection, conjunctivae and sclerae normal  HENT: normal cephalic/atraumatic.  External ears, nose and mouth without ulcers or lesions.  RESP: no audible wheeze, cough, or visible cyanosis.  No visible retractions or increased work of breathing.  Able to speak fully in complete sentences.  NEURO: Cranial nerves grossly intact, mentation intact and speech normal  PSYCH: mentation appears normal, affect normal/bright, judgement and insight intact, normal speech and appearance well-groomed    CC  Patient Care Team:  No Ref-Primary, Physician as PCP - General  Alvaro Blackmon MD as MD (INTERNAL MEDICINE - ENDOCRINOLOGY, DIABETES & METABOLISM)  Sera Turner MD as MD (OB/Gyn)  Dalia Ruano PA-C as Physician Assistant (Gastroenterology)  Krystal Flores APRN CNP as Nurse Practitioner (Dermatology)  Sera Turner MD as Assigned OBGYN Provider  Eliezer Rahman PA-C as Assigned Gastroenterology Provider  Steve Pierce MD as MD (Urology)  Alvaro Blackmon MD as Assigned Endocrinology Provider  Tanvi Ceron MD as MD (Colon & Rectal)  Nia Workman PA-C as Assigned Surgical Provider  Krystal Flores APRN CNP as Assigned Dermatology Provider  STEVE PIERCE        Again, thank you for allowing me to participate in the care of your patient.      Sincerely,    Steve Pierce MD

## 2025-04-10 NOTE — PROGRESS NOTES
Virtual Visit Details    Type of service:  Video Visit   12:46 PM  1:08PM  Originating Location (pt. Location): Home    Distant Location (provider location):  On-site  Platform used for Video Visit: [place]    April 10, 2025    Margo was seen today for recheck.    Diagnoses and all orders for this visit:    Vaginal vault prolapse    Pelvic floor dysfunction    S/P vaginal hysterectomy    Rectal prolapse       We discussed the reasoning to do the combination case versus just the VMR.    We discussed recommendation of bilateral oophorectomy at time of the surgery if we are there    We spent a long time discussing the proposed combination surgery and how that differs if Dr Ceron were to only do the VMR.  We also discussed risks of surgery and mesh as it related to the 2011 FDA mesh warning and to pelvic surgeries    We discussed that the risks to the procedure include but not limited to cardiovascular risks of anesthesia, nerve injury, bleeding, infection, injury to adjacent organs including bowel, bladder, and blood vessels, conversion to open procedure, de rashaad or worsening stress incontinence or urge incontinence, need for post-operative ames catheter, need for further procedures including for mesh extrusion or erosion.     She has an appointment with Dr Ceron later today and will think about it.  Message sent to Dr Ceron    30 minutes were spent today on the day of the encounter in reviewing the EMR, direct patient care including surgical counseling, coordination of care and documentation    Flaquita Pierce MD MPH  (she/her/hers)   of Urology  [facility]    Subjective    Here today to discuss possible SCP.  She denies any changes in health since last visit    LMP 01/26/2018 (Approximate)   GENERAL: healthy, alert and no distress  EYES: Eyes grossly normal to inspection, conjunctivae and sclerae normal  HENT: normal cephalic/atraumatic.  External ears, nose and mouth without  ulcers or lesions.  RESP: no audible wheeze, cough, or visible cyanosis.  No visible retractions or increased work of breathing.  Able to speak fully in complete sentences.  NEURO: Cranial nerves grossly intact, mentation intact and speech normal  PSYCH: mentation appears normal, affect normal/bright, judgement and insight intact, normal speech and appearance well-groomed    CC  Patient Care Team:  No Ref-Primary, Physician as PCP - General  Alvaro Blackmon MD as MD (INTERNAL MEDICINE - ENDOCRINOLOGY, DIABETES & METABOLISM)  Sera Turner MD as MD (OB/Gyn)  Dalia Ruano PA-C as Physician Assistant (Gastroenterology)  Krystal Flores APRN CNP as Nurse Practitioner (Dermatology)  Sera Turner MD as Assigned OBGYN Provider  Eliezer Rahman PA-C as Assigned Gastroenterology Provider  Steve Pierce MD as MD (Urology)  Alvaro Blackmon MD as Assigned Endocrinology Provider  Tanvi Ceron MD as MD (Colon & Rectal)  Nia Workman PA-C as Assigned Surgical Provider  Krystal Flores APRN CNP as Assigned Dermatology Provider  STEVE PIERCE

## 2025-04-15 ENCOUNTER — TELEPHONE (OUTPATIENT)
Dept: SURGERY | Facility: CLINIC | Age: 60
End: 2025-04-15
Payer: COMMERCIAL

## 2025-04-15 NOTE — TELEPHONE ENCOUNTER
Called and spoke to patient to confirm she would like to proceed with surgery on 5/19 with Dr. Ceron only and not with Dr. Pierce. Patient confirmed 5/19 at [facility] with Dr. Ceron.     Date of Surgery: 05/19/2025     Estimated Arrival time Discussed with Patient:  No - likely early morning      Location of surgery: [facility]      Pre-Op H&P: PAC - 5/1 at 11AM    WOC: No      Labs: Yes 5/1 at 12:15PM     Imaging: No      Post-Op Appt Date w/ NP/PA:  Krystal Myrick APRN, CNP 6/3 at 1PM     Post-Op Appt Date w/ Surgeon:  Dr. Ceron 6/26 at 11:45AM - [place]      Bowel Prep:  Yes  Full Prep WITHOUT antibiotics Sent via [employer] Message    Discussed with patient pre-op RN will call 2-3 days prior to surgery with arrival time and instructions:  Yes     Standard Surgery Packet Sent: Yes 04/15/25  via [employer] Message      Additional Comments: All patients questions were answered and was instructed to review surgical packet and call back with any questions or concerns.       Ashlyn Jorge on 4/15/2025 at 11:21 AM

## 2025-04-16 ENCOUNTER — TELEPHONE (OUTPATIENT)
Dept: ENDOCRINOLOGY | Facility: CLINIC | Age: 60
End: 2025-04-16
Payer: COMMERCIAL

## 2025-04-16 NOTE — TELEPHONE ENCOUNTER
Called patient to reschedule 7/15/25 visit with KEM Workman.  Options pushed the visit past July.   Patient's insurance will end at the end of July and they aren't sure what the next plan may or may not cover.   Sent a Staff Message requesting the use of NBS to keep her visit in July.

## 2025-04-17 ENCOUNTER — TELEPHONE (OUTPATIENT)
Dept: ENDOCRINOLOGY | Facility: CLINIC | Age: 60
End: 2025-04-17

## 2025-04-17 NOTE — TELEPHONE ENCOUNTER
Patient confirmed scheduled appointment:     Date: 7/29/25  Time: 12Noon  Visit type: Return Weight Management  Visit mode: Virtual Visit  Provider:  Nia Workman PA-C  [place] [facility]    Additional Notes:   Meggan julian'bryce nbs to stay in July

## 2025-05-01 ENCOUNTER — OFFICE VISIT (OUTPATIENT)
Dept: SURGERY | Facility: CLINIC | Age: 60
End: 2025-05-01
Payer: COMMERCIAL

## 2025-05-01 ENCOUNTER — LAB (OUTPATIENT)
Dept: LAB | Facility: CLINIC | Age: 60
End: 2025-05-01
Payer: COMMERCIAL

## 2025-05-01 ENCOUNTER — PRE VISIT (OUTPATIENT)
Dept: SURGERY | Facility: CLINIC | Age: 60
End: 2025-05-01

## 2025-05-01 ENCOUNTER — ANESTHESIA EVENT (OUTPATIENT)
Dept: SURGERY | Facility: CLINIC | Age: 60
DRG: 330 | End: 2025-05-01
Payer: COMMERCIAL

## 2025-05-01 VITALS
BODY MASS INDEX: 25.72 KG/M2 | HEIGHT: 60 IN | WEIGHT: 131 LBS | HEART RATE: 65 BPM | TEMPERATURE: 97.6 F | RESPIRATION RATE: 16 BRPM | OXYGEN SATURATION: 100 % | DIASTOLIC BLOOD PRESSURE: 84 MMHG | SYSTOLIC BLOOD PRESSURE: 132 MMHG

## 2025-05-01 DIAGNOSIS — Z01.818 PREOP EXAMINATION: Primary | ICD-10-CM

## 2025-05-01 DIAGNOSIS — R39.89 SUSPECTED UTI: ICD-10-CM

## 2025-05-01 DIAGNOSIS — K62.3 RECTAL PROLAPSE: ICD-10-CM

## 2025-05-01 DIAGNOSIS — E66.811 CLASS 1 OBESITY WITHOUT SERIOUS COMORBIDITY WITH BODY MASS INDEX (BMI) OF 30.0 TO 30.9 IN ADULT, UNSPECIFIED OBESITY TYPE: ICD-10-CM

## 2025-05-01 DIAGNOSIS — E66.3 OVERWEIGHT (BMI 25.0-29.9): ICD-10-CM

## 2025-05-01 LAB
ALBUMIN SERPL BCG-MCNC: 4.6 G/DL (ref 3.5–5.2)
ALP SERPL-CCNC: 88 U/L (ref 40–150)
ALT SERPL W P-5'-P-CCNC: 16 U/L (ref 0–50)
ANION GAP SERPL CALCULATED.3IONS-SCNC: 7 MMOL/L (ref 7–15)
AST SERPL W P-5'-P-CCNC: 20 U/L (ref 0–45)
BASOPHILS # BLD AUTO: 0 10E3/UL (ref 0–0.2)
BASOPHILS NFR BLD AUTO: 1 %
BILIRUB DIRECT SERPL-MCNC: 0.29 MG/DL (ref 0–0.3)
BILIRUB SERPL-MCNC: 0.9 MG/DL
BUN SERPL-MCNC: 14.4 MG/DL (ref 8–23)
CALCIUM SERPL-MCNC: 10 MG/DL (ref 8.8–10.4)
CHLORIDE SERPL-SCNC: 104 MMOL/L (ref 98–107)
CREAT SERPL-MCNC: 0.82 MG/DL (ref 0.51–0.95)
EGFRCR SERPLBLD CKD-EPI 2021: 81 ML/MIN/1.73M2
EOSINOPHIL # BLD AUTO: 0.1 10E3/UL (ref 0–0.7)
EOSINOPHIL NFR BLD AUTO: 1 %
ERYTHROCYTE [DISTWIDTH] IN BLOOD BY AUTOMATED COUNT: 13.5 % (ref 10–15)
GLUCOSE SERPL-MCNC: 95 MG/DL (ref 70–99)
HCO3 SERPL-SCNC: 31 MMOL/L (ref 22–29)
HCT VFR BLD AUTO: 39.5 % (ref 35–47)
HGB BLD-MCNC: 13.2 G/DL (ref 11.7–15.7)
IMM GRANULOCYTES # BLD: 0 10E3/UL
IMM GRANULOCYTES NFR BLD: 0 %
LYMPHOCYTES # BLD AUTO: 1.4 10E3/UL (ref 0.8–5.3)
LYMPHOCYTES NFR BLD AUTO: 29 %
MCH RBC QN AUTO: 29.9 PG (ref 26.5–33)
MCHC RBC AUTO-ENTMCNC: 33.4 G/DL (ref 31.5–36.5)
MCV RBC AUTO: 90 FL (ref 78–100)
MONOCYTES # BLD AUTO: 0.5 10E3/UL (ref 0–1.3)
MONOCYTES NFR BLD AUTO: 9 %
NEUTROPHILS # BLD AUTO: 3 10E3/UL (ref 1.6–8.3)
NEUTROPHILS NFR BLD AUTO: 60 %
NRBC # BLD AUTO: 0 10E3/UL
NRBC BLD AUTO-RTO: 0 /100
PLATELET # BLD AUTO: 244 10E3/UL (ref 150–450)
POTASSIUM SERPL-SCNC: 4.9 MMOL/L (ref 3.4–5.3)
PREALB SERPL-MCNC: 27.6 MG/DL (ref 20–40)
PROT SERPL-MCNC: 7.5 G/DL (ref 6.4–8.3)
RBC # BLD AUTO: 4.41 10E6/UL (ref 3.8–5.2)
SODIUM SERPL-SCNC: 142 MMOL/L (ref 135–145)
WBC # BLD AUTO: 4.9 10E3/UL (ref 4–11)

## 2025-05-01 PROCEDURE — 84134 ASSAY OF PREALBUMIN: CPT | Performed by: COLON & RECTAL SURGERY

## 2025-05-01 PROCEDURE — 99000 SPECIMEN HANDLING OFFICE-LAB: CPT | Performed by: PATHOLOGY

## 2025-05-01 RX ORDER — CROMOLYN SODIUM 5.2 MG
1 AEROSOL, SPRAY WITH PUMP (ML) NASAL DAILY
COMMUNITY

## 2025-05-01 ASSESSMENT — LIFESTYLE VARIABLES: TOBACCO_USE: 0

## 2025-05-01 ASSESSMENT — PAIN SCALES - GENERAL: PAINLEVEL_OUTOF10: NO PAIN (0)

## 2025-05-01 NOTE — H&P (VIEW-ONLY)
Pre-Operative H & P     CC:  Preoperative exam to assess for increased cardiopulmonary risk while undergoing surgery and anesthesia.    Date of Encounter: 5/1/2025  Primary Care Physician:  No Ref-Primary, Physician     Reason for visit:   Encounter Diagnoses   Name Primary?    Rectal prolapse Yes    Preop examination        HPI  Margo Chris is a 60 year old female who presents for pre-operative H & P in preparation for  Procedure Information       Case: [id number] Date/Time: 05/19/25 0730    Procedure: Robotic ventral mesh rectopexy (Abdomen)    Anesthesia type: General    Diagnosis: Rectal prolapse [K62.3]    Pre-op diagnosis: Rectal prolapse [K62.3]    Location: [place] [address] /  OR    Providers: Tanvi Ceron MD            Patient is being evaluated for comorbid conditions of Gilbert's syndrome, HLD, depression.     Ms. Chris has a history of obstructed defecation syndrome and deep rectoanal intussusception on defecography. She has been counseled by Dr. Ceron and Dr. Pierce regarding her symptomatic rectal prolapse and asymptomatic less pronounced vaginal vault prolapse.     History is obtained from the patient and chart review    Hx of abnormal bleeding or anti-platelet use: denies    Menstrual history: Patient's last menstrual period was 01/26/2018 (approximate).:       Past Medical History  Past Medical History:   Diagnosis Date    Chronic depression     Gilbert's syndrome     Other and unspecified hyperlipidemia     Rectal prolapse        Past Surgical History  Past Surgical History:   Procedure Laterality Date    COLONOSCOPY N/A 3/27/2025    Procedure: Colonoscopy;  Surgeon: Debora Hickey MD;  Location:  GI    HC TOOTH EXTRACTION W/FORCEP      LAPAROSCOPIC ASSISTED HYSTERECTOMY VAGINAL N/A 2/16/2018    Procedure: LAPAROSCOPIC ASSISTED HYSTERECTOMY VAGINAL;  LAPAROSCOPIC ASSISTED VAGINAL HYSTERECTOMY BILATERAL SALPINGECTOMY ;  Surgeon: Hiwot Tim MD;  Location: [facility]     LAPAROSCOPIC SALPINGECTOMY Bilateral 2/16/2018    Procedure: LAPAROSCOPIC SALPINGECTOMY;;  Surgeon: Hiwot Tim MD;  Location: [facility]       Prior to Admission Medications  Current Outpatient Medications   Medication Sig Dispense Refill    buPROPion (WELLBUTRIN XL) 300 MG 24 hr tablet Take 1 tablet (300 mg) by mouth every morning. 90 tablet 5    cromolyn sodium (NASALCROM) 5.2 MG/ACT nasal aerosol Spray 1 spray into both nostrils daily.      Multiple Vitamins-Minerals (MULTIVITAMIN ADULT) CHEW Take 2 chew tab by mouth daily      magnesium citrate 1.745 GM/30ML solution Drink 1 bottle at 8pm the night before surgery (Patient not taking: Reported on 4/10/2025) 296 mL 0    ondansetron (ZOFRAN) 4 MG tablet Take 1 tablet (4 mg) by mouth every 8 hours as needed for nausea. (Patient not taking: Reported on 4/10/2025) 5 tablet 0    polyethylene glycol (MIRALAX) 17 GM/Dose powder Please take 238 grams mixed with 64 oz of Gatorade at 4pm the night before surgery (Patient not taking: Reported on 4/10/2025) 238 g 0       Allergies  Allergies   Allergen Reactions    Food      Apricot, mangos, peaches    Sulfa Antibiotics Rash       Social History  Social History     Socioeconomic History    Marital status:      Spouse name: Not on file    Number of children: Not on file    Years of education: Not on file    Highest education level: Not on file   Occupational History    Not on file   Tobacco Use    Smoking status: Never    Smokeless tobacco: Never   Vaping Use    Vaping status: Never Used   Substance and Sexual Activity    Alcohol use: Not Currently    Drug use: No    Sexual activity: Not on file   Other Topics Concern    Parent/sibling w/ CABG, MI or angioplasty before 65F 55M? Not Asked   Social History Narrative    Not on file     Social Drivers of Health     Financial Resource Strain: Not on file   Food Insecurity: Not on file   Transportation Needs: Not on file   Physical Activity: Not on file   Stress: Not on  file   Social Connections: Not on file   Interpersonal Safety: Not on file   Housing Stability: Not on file       Family History  Family History   Problem Relation Age of Onset    Hypertension Mother     Diabetes Father     Hypertension Father     Lung Cancer Father     Melanoma Father     Skin Cancer Father     Anesthesia Reaction No family hx of     Deep Vein Thrombosis (DVT) No family hx of        Review of Systems  The complete review of systems is negative other than noted in the HPI or here.     Anesthesia Evaluation   Pt has had prior anesthetic.     History of anesthetic complications   Very delayed emergence prompting overnight stay.    ROS/MED HX  ENT/Pulmonary:  - neg pulmonary ROS  (-) tobacco use, asthma, sleep apnea and recent URI   Neurologic:  - neg neurologic ROS  (-) no CVA and no TIA   Cardiovascular:     (+) Dyslipidemia - -   -  - -                                      METS/Exercise Tolerance: >4 METS Comment: Walks 10,000 steps per day, bicycles in warmer weather.     Hematologic:  - neg hematologic  ROS  (-) history of blood clots and history of blood transfusion   Musculoskeletal:  - neg musculoskeletal ROS     GI/Hepatic: Comment: Obstructed defecation syndrome.  Deep rectoanal intussusception on defecography.   Rectal prolapse.  Gilbert syndrome.       (-) GERD and liver disease   Renal/Genitourinary: Comment: Vaginal vault prolapse   (-) renal disease   Endo:  - neg endo ROS  (-) Type II DM   Psychiatric/Substance Use:     (+) psychiatric history depression       Infectious Disease:  - neg infectious disease ROS     Malignancy:  - neg malignancy ROS     Other:  - neg other ROS          /84 (BP Location: Right arm, Patient Position: Sitting, Cuff Size: Adult Regular)   Pulse 65   Temp 97.6  F (36.4  C) (Oral)   Resp 16   Ht 1.524 m (5')   Wt 59.4 kg (131 lb)   LMP 01/26/2018 (Approximate)   SpO2 100%   Breastfeeding No   BMI 25.58 kg/m      Physical Exam  Constitutional:  Awake, alert, cooperative, no apparent distress, and appears stated age.  Eyes: Pupils equal, round and reactive to light, extra ocular muscles intact, sclera clear, conjunctiva normal.  HENT: Normocephalic, oral pharynx with moist mucus membranes, good dentition. No goiter appreciated. No removable dental hardware.  Respiratory: Clear to auscultation bilaterally, no crackles or wheezing. No SOB when supine.  Cardiovascular: Regular rate and rhythm, normal S1 and S2, and no murmur noted.  Carotids +2, no bruits. No edema. Palpable pulses to radial, DP and PT arteries.   GI: Normal bowel sounds, soft, non-distended, non-tender, no masses palpated.    Lymph/Hematologic: No cervical lymphadenopathy and no supraclavicular lymphadenopathy.  Genitourinary:  deferred  Skin: Warm and dry.  No rashes.   Musculoskeletal: full ROM of neck. There is no redness, warmth, or swelling of the joints. Gross motor strength is normal.    Neurologic: Awake, alert, oriented to name, place and time. Cranial nerves II-XII are grossly intact. Gait is normal. Ambulates from chair to exam table, seats self, lies supine and sits back up w/o assistance.  Neuropsychiatric: Calm, cooperative. Normal affect. Pleasant. Answers questions appropriately, follows commands w/o difficulty.        PRIOR LABS/DIAGNOSTIC STUDIES:    All pertinent records, results, labs and imaging personally reviewed        LAB/DIAGNOSTIC STUDIES TODAY:  CBC, CMP, prealb    Assessment    Margo Chris is a 60 year old female seen as a PAC referral for risk assessment and optimization for anesthesia.    Plan/Recommendations  Pt will be optimized for the proposed procedure.  See below for details on the assessment, risk, and preoperative recommendations    NEUROLOGY  - No history of TIA, CVA or seizure    -Post Op delirium risk factors:  No risk identified    ENT  - No current airway concerns.  Will need to be reassessed day of surgery.  Mallampati: I  TM: >  3    CARDIAC  - No history of CAD, Hypertension, and Afib  - METS (Metabolic Equivalents)  Walks 10,000 steps per day, bicycles in warmer weather.    Patient performs 4 or more METS exercise without symptoms             Total Score: 0      RCRI-Very low risk: Class 1 0.4% complication rate             Total Score: 0        PULMONARY  KATIUSKA Low Risk             Total Score: 1    KATIUSKA: Over 50 ys old      - Denies asthma or inhaler use  - Tobacco History    History   Smoking Status    Never   Smokeless Tobacco    Never       GI  - Denies GERD  - Obstructed defecation syndrome.  - Deep rectoanal intussusception on defecography.   - Rectal prolapse.  - Gilbert's syndrome.    PONV High Risk  Total Score: 3           1 AN PONV: Pt is Female    1 AN PONV: Patient is not a current smoker    1 AN PONV: Intended Post Op Opioids        /RENAL  - Vaginal vault prolapse, asymptomatic    ENDOCRINE    - BMI: Estimated body mass index is 25.58 kg/m  as calculated from the following:    Height as of this encounter: 1.524 m (5').    Weight as of this encounter: 59.4 kg (131 lb).  Healthy Weight (BMI 18.5-24.9)  - No history of Diabetes Mellitus    HEME  VTE Low Risk 0.26%             Total Score: 1    VTE: Greater than 59 yrs old      - No history of abnormal bleeding or antiplatelet use.      MSK  Patient is NOT Frail             Total Score: 0          The patient is aware that the final anesthesia plan will be decided by the assigned anesthesia provider on the date of service.      The patient is optimized for their procedure. AVS with information on surgery time/arrival time, meds and NPO status given by nursing staff. No further diagnostic testing indicated.      On the day of service:     Prep time: 13 minutes  Visit time: 18 minutes  Documentation time: 12 minutes  ------------------------------------------  Total time: 43 minutes      Lupis Gonzales PA-C  Preoperative Assessment Center  [facility]  Phone: [phone]  Fax: [phone]

## 2025-05-01 NOTE — PATIENT INSTRUCTIONS
Name:  Margo Chris   MRN:  [id number]   :  1965   Today's Date:  2025         You were seen today for a pre-operative assessment in the:    Pre-operative Anesthesia Assessment Center(PAC)  [facility]  [address] [place] [id number]  phone [phone]      You will be receiving a call with location, date, arrival time and diet instructions from Preadmission Nursing at your surgical site:    -[facility]: [phone]   -[facility]: [phone]  -[facility]: [phone]    -[facility]: [phone]    -[facility]: [phone]  -[facility]: [phone]  -[facility]: [phone]  -: [phone]  -[facility]: [phone]        Anesthesia recommendations for medications:    Hold Aspirin for 7 days before procedure.    Hold Multivitamins for 7 days before procedure.     Hold Herbal medications and Supplements for 7 days before procedure.  Hold Ibuprofen for 1 day before procedure.   Hold Naproxen for 4 days before procedure.     No alcohol or cannabis products for 24 hours before your procedure       Please take these medications the day of procedure:  Bupropion  Nasal spray if needed       How do I prepare myself?  - Please take 2 showers (one the night prior to surgery and one the morning of surgery) using Scrubcare or Hibiclens soap.    Use this soap only from the neck to your toes.     Leave the soap on your skin for one minute--then rinse thoroughly.      You may use your own shampoo and conditioner. No other hair products.   - Please remove all jewelry and body piercings.  - No lotions, deodorants or fragrance.  - No makeup or fingernail polish.   - Bring your ID and insurance card.    -If you have a Deep Brain Stimulator, a Spinal Cord Stimulator, or any implanted Neuro Device, you must bring the remote to your appointment         For further questions regarding  your surgery please call your surgeon's office.

## 2025-05-01 NOTE — H&P
Pre-Operative H & P     CC:  Preoperative exam to assess for increased cardiopulmonary risk while undergoing surgery and anesthesia.    Date of Encounter: 5/1/2025  Primary Care Physician:  No Ref-Primary, Physician     Reason for visit:   Encounter Diagnoses   Name Primary?    Rectal prolapse Yes    Preop examination        HPI  Margo Chris is a 60 year old female who presents for pre-operative H & P in preparation for  Procedure Information       Case: [id number] Date/Time: 05/19/25 0730    Procedure: Robotic ventral mesh rectopexy (Abdomen)    Anesthesia type: General    Diagnosis: Rectal prolapse [K62.3]    Pre-op diagnosis: Rectal prolapse [K62.3]    Location: [place] [address] /  OR    Providers: Tanvi Ceron MD            Patient is being evaluated for comorbid conditions of Gilbert's syndrome, HLD, depression.     Ms. Chris has a history of obstructed defecation syndrome and deep rectoanal intussusception on defecography. She has been counseled by Dr. Ceron and Dr. Pierce regarding her symptomatic rectal prolapse and asymptomatic less pronounced vaginal vault prolapse.     History is obtained from the patient and chart review    Hx of abnormal bleeding or anti-platelet use: denies    Menstrual history: Patient's last menstrual period was 01/26/2018 (approximate).:       Past Medical History  Past Medical History:   Diagnosis Date    Chronic depression     Gilbert's syndrome     Other and unspecified hyperlipidemia     Rectal prolapse        Past Surgical History  Past Surgical History:   Procedure Laterality Date    COLONOSCOPY N/A 3/27/2025    Procedure: Colonoscopy;  Surgeon: Debora Hickey MD;  Location:  GI    HC TOOTH EXTRACTION W/FORCEP      LAPAROSCOPIC ASSISTED HYSTERECTOMY VAGINAL N/A 2/16/2018    Procedure: LAPAROSCOPIC ASSISTED HYSTERECTOMY VAGINAL;  LAPAROSCOPIC ASSISTED VAGINAL HYSTERECTOMY BILATERAL SALPINGECTOMY ;  Surgeon: Hiwot Tim MD;  [facility]: [facility]     LAPAROSCOPIC SALPINGECTOMY Bilateral 2/16/2018    Procedure: LAPAROSCOPIC SALPINGECTOMY;;  Surgeon: Hiwot Tim MD;  Location: [facility]       Prior to Admission Medications  Current Outpatient Medications   Medication Sig Dispense Refill    buPROPion (WELLBUTRIN XL) 300 MG 24 hr tablet Take 1 tablet (300 mg) by mouth every morning. 90 tablet 5    cromolyn sodium (NASALCROM) 5.2 MG/ACT nasal aerosol Spray 1 spray into both nostrils daily.      Multiple Vitamins-Minerals (MULTIVITAMIN ADULT) CHEW Take 2 chew tab by mouth daily      magnesium citrate 1.745 GM/30ML solution Drink 1 bottle at 8pm the night before surgery (Patient not taking: Reported on 4/10/2025) 296 mL 0    ondansetron (ZOFRAN) 4 MG tablet Take 1 tablet (4 mg) by mouth every 8 hours as needed for nausea. (Patient not taking: Reported on 4/10/2025) 5 tablet 0    polyethylene glycol (MIRALAX) 17 GM/Dose powder Please take 238 grams mixed with 64 oz of Gatorade at 4pm the night before surgery (Patient not taking: Reported on 4/10/2025) 238 g 0       Allergies  Allergies   Allergen Reactions    Food      Apricot, mangos, peaches    Sulfa Antibiotics Rash       Social History  Social History     Socioeconomic History    Marital status:      Spouse name: Not on file    Number of children: Not on file    Years of education: Not on file    Highest education level: Not on file   Occupational History    Not on file   Tobacco Use    Smoking status: Never    Smokeless tobacco: Never   Vaping Use    Vaping status: Never Used   Substance and Sexual Activity    Alcohol use: Not Currently    Drug use: No    Sexual activity: Not on file   Other Topics Concern    Parent/sibling w/ CABG, MI or angioplasty before 65F 55M? Not Asked   Social History Narrative    Not on file     Social Drivers of Health     Financial Resource Strain: Not on file   Food Insecurity: Not on file   Transportation Needs: Not on file   Physical Activity: Not on file   Stress: Not on  file   Social Connections: Not on file   Interpersonal Safety: Not on file   Housing Stability: Not on file       Family History  Family History   Problem Relation Age of Onset    Hypertension Mother     Diabetes Father     Hypertension Father     Lung Cancer Father     Melanoma Father     Skin Cancer Father     Anesthesia Reaction No family hx of     Deep Vein Thrombosis (DVT) No family hx of        Review of Systems  The complete review of systems is negative other than noted in the HPI or here.     Anesthesia Evaluation   Pt has had prior anesthetic.     History of anesthetic complications   Very delayed emergence prompting overnight stay.    ROS/MED HX  ENT/Pulmonary:  - neg pulmonary ROS  (-) tobacco use, asthma, sleep apnea and recent URI   Neurologic:  - neg neurologic ROS  (-) no CVA and no TIA   Cardiovascular:     (+) Dyslipidemia - -   -  - -                                      METS/Exercise Tolerance: >4 METS Comment: Walks 10,000 steps per day, bicycles in warmer weather.     Hematologic:  - neg hematologic  ROS  (-) history of blood clots and history of blood transfusion   Musculoskeletal:  - neg musculoskeletal ROS     GI/Hepatic: Comment: Obstructed defecation syndrome.  Deep rectoanal intussusception on defecography.   Rectal prolapse.  Gilbert syndrome.       (-) GERD and liver disease   Renal/Genitourinary: Comment: Vaginal vault prolapse   (-) renal disease   Endo:  - neg endo ROS  (-) Type II DM   Psychiatric/Substance Use:     (+) psychiatric history depression       Infectious Disease:  - neg infectious disease ROS     Malignancy:  - neg malignancy ROS     Other:  - neg other ROS          /84 (BP Location: Right arm, Patient Position: Sitting, Cuff Size: Adult Regular)   Pulse 65   Temp 97.6  F (36.4  C) (Oral)   Resp 16   Ht 1.524 m (5')   Wt 59.4 kg (131 lb)   LMP 01/26/2018 (Approximate)   SpO2 100%   Breastfeeding No   BMI 25.58 kg/m      Physical Exam  Constitutional:  Awake, alert, cooperative, no apparent distress, and appears stated age.  Eyes: Pupils equal, round and reactive to light, extra ocular muscles intact, sclera clear, conjunctiva normal.  HENT: Normocephalic, oral pharynx with moist mucus membranes, good dentition. No goiter appreciated. No removable dental hardware.  Respiratory: Clear to auscultation bilaterally, no crackles or wheezing. No SOB when supine.  Cardiovascular: Regular rate and rhythm, normal S1 and S2, and no murmur noted.  Carotids +2, no bruits. No edema. Palpable pulses to radial, DP and PT arteries.   GI: Normal bowel sounds, soft, non-distended, non-tender, no masses palpated.    Lymph/Hematologic: No cervical lymphadenopathy and no supraclavicular lymphadenopathy.  Genitourinary:  deferred  Skin: Warm and dry.  No rashes.   Musculoskeletal: full ROM of neck. There is no redness, warmth, or swelling of the joints. Gross motor strength is normal.    Neurologic: Awake, alert, oriented to name, place and time. Cranial nerves II-XII are grossly intact. Gait is normal. Ambulates from chair to exam table, seats self, lies supine and sits back up w/o assistance.  Neuropsychiatric: Calm, cooperative. Normal affect. Pleasant. Answers questions appropriately, follows commands w/o difficulty.        PRIOR LABS/DIAGNOSTIC STUDIES:    All pertinent records, results, labs and imaging personally reviewed        LAB/DIAGNOSTIC STUDIES TODAY:  CBC, CMP, prealb    Assessment    Margo Chris is a 60 year old female seen as a PAC referral for risk assessment and optimization for anesthesia.    Plan/Recommendations  Pt will be optimized for the proposed procedure.  See below for details on the assessment, risk, and preoperative recommendations    NEUROLOGY  - No history of TIA, CVA or seizure    -Post Op delirium risk factors:  No risk identified    ENT  - No current airway concerns.  Will need to be reassessed day of surgery.  Mallampati: I  TM: >  3    CARDIAC  - No history of CAD, Hypertension, and Afib  - METS (Metabolic Equivalents)  Walks 10,000 steps per day, bicycles in warmer weather.    Patient performs 4 or more METS exercise without symptoms             Total Score: 0      RCRI-Very low risk: Class 1 0.4% complication rate             Total Score: 0        PULMONARY  KATIUSKA Low Risk             Total Score: 1    KATIUSKA: Over 50 ys old      - Denies asthma or inhaler use  - Tobacco History    History   Smoking Status    Never   Smokeless Tobacco    Never       GI  - Denies GERD  - Obstructed defecation syndrome.  - Deep rectoanal intussusception on defecography.   - Rectal prolapse.  - Gilbert's syndrome.    PONV High Risk  Total Score: 3           1 AN PONV: Pt is Female    1 AN PONV: Patient is not a current smoker    1 AN PONV: Intended Post Op Opioids        /RENAL  - Vaginal vault prolapse, asymptomatic    ENDOCRINE    - BMI: Estimated body mass index is 25.58 kg/m  as calculated from the following:    Height as of this encounter: 1.524 m (5').    Weight as of this encounter: 59.4 kg (131 lb).  Healthy Weight (BMI 18.5-24.9)  - No history of Diabetes Mellitus    HEME  VTE Low Risk 0.26%             Total Score: 1    VTE: Greater than 59 yrs old      - No history of abnormal bleeding or antiplatelet use.      MSK  Patient is NOT Frail             Total Score: 0          The patient is aware that the final anesthesia plan will be decided by the assigned anesthesia provider on the date of service.      The patient is optimized for their procedure. AVS with information on surgery time/arrival time, meds and NPO status given by nursing staff. No further diagnostic testing indicated.      On the day of service:     Prep time: 13 minutes  Visit time: 18 minutes  Documentation time: 12 minutes  ------------------------------------------  Total time: 43 minutes      Lupis Gonzales PA-C  Preoperative Assessment Center  [facility]  Phone: [phone]  Fax: [phone]

## 2025-05-12 ENCOUNTER — TEAM CONFERENCE (OUTPATIENT)
Dept: SURGERY | Facility: CLINIC | Age: 60
End: 2025-05-12
Payer: COMMERCIAL

## 2025-05-12 NOTE — PROGRESS NOTES
COLON AND RECTAL SURGERY HUDDLE:    Patient was reviewed in preparation for their surgery the following was reviewed and has been completed:    Surgeon: Dr. Tanvi Ceron    Surgery & Date: 5/19 Robotic ventral mesh rectopexy     Last MD Note: reviewed    Anesthesia Type: General    Other Providers: No    PAC: Yes    WOC: N/A    Labs: Yes    Bowel Prep: Yes MiraLAX / Gatorade  and Magnesium Citrate    Packet: Yes    Imaging: N/A    Post-Op Appointments: Yes    Pre op soap: Left voicemail x1 with direct contact number.      Is patient on TPN?: N/A   If yes, I contacted the TPN pharmacist by paging the  pharmacy at [phone] or calling [phone]. I also contacted Dede ZAVALA with inpatient colon and rectal team.     Pre op call with education complete: Yes via [employer]  Attempt 1: Left voicemail x1 with direct contact number.

## 2025-05-15 NOTE — PROGRESS NOTES
PTA medications updated by Medication Scribe prior to surgery via phone call with patient (last doses completed by Nurse)     Medication history sources: Patient, Surescripts, and H&P  In the past week, patient estimated taking medication this percent of the time: Greater than 90%      Significant changes made to the medication list:  Patient reports no longer taking the following meds (med scribe removed from PTA med list): Nasalcrom      Additional medication history information:   None    Medication reconciliation completed by provider prior to medication history? No    Time spent in this activity: 15 minutes    The information provided in this note is only as accurate as the sources available at the time of update(s)      Prior to Admission medications    Medication Sig Last Dose Taking? Auth Provider Long Term End Date   buPROPion (WELLBUTRIN XL) 300 MG 24 hr tablet Take 1 tablet (300 mg) by mouth every morning. Morning Yes Alvaro Blackmon MD Yes    magnesium citrate 1.745 GM/30ML solution Drink 1 bottle at 8pm the night before surgery Evening Yes Tanvi Ceron MD     Multiple Vitamins-Minerals (MULTIVITAMIN ADULT) CHEW Take 2 chew tab by mouth daily 5/12/2025 Morning Yes Reported, Patient     ondansetron (ZOFRAN) 4 MG tablet Take 1 tablet (4 mg) by mouth every 8 hours as needed for nausea. Evening Yes Tanvi Ceron MD     polyethylene glycol (MIRALAX) 17 GM/Dose powder Please take 238 grams mixed with 64 oz of Gatorade at 4pm the night before surgery Evening Yes Tanvi Ceron MD         Medication history completed by: Zachary Elliott

## 2025-05-19 ENCOUNTER — HOSPITAL ENCOUNTER (INPATIENT)
Facility: CLINIC | Age: 60
LOS: 1 days | Discharge: HOME | DRG: 330 | End: 2025-05-20
Attending: COLON & RECTAL SURGERY | Admitting: COLON & RECTAL SURGERY
Payer: COMMERCIAL

## 2025-05-19 ENCOUNTER — ANESTHESIA (OUTPATIENT)
Dept: SURGERY | Facility: CLINIC | Age: 60
DRG: 330 | End: 2025-05-19
Payer: COMMERCIAL

## 2025-05-19 DIAGNOSIS — G89.18 ACUTE POST-OPERATIVE PAIN: Primary | ICD-10-CM

## 2025-05-19 DIAGNOSIS — Z98.890 HISTORY OF RECTOPEXY: ICD-10-CM

## 2025-05-19 PROBLEM — K62.3 RECTAL PROLAPSE: Status: ACTIVE | Noted: 2025-05-19

## 2025-05-19 LAB
MCV RBC AUTO: 91 FL (ref 78–100)
PLATELET # BLD AUTO: 199 10E3/UL (ref 150–450)

## 2025-05-19 PROCEDURE — 999N000141 HC STATISTIC PRE-PROCEDURE NURSING ASSESSMENT: Performed by: COLON & RECTAL SURGERY

## 2025-05-19 PROCEDURE — 710N000009 HC RECOVERY PHASE 1, LEVEL 1, PER MIN: Performed by: COLON & RECTAL SURGERY

## 2025-05-19 PROCEDURE — 0DSP4ZZ REPOSITION RECTUM, PERCUTANEOUS ENDOSCOPIC APPROACH: ICD-10-PCS | Performed by: COLON & RECTAL SURGERY

## 2025-05-19 PROCEDURE — 258N000003 HC RX IP 258 OP 636: Performed by: ANESTHESIOLOGY

## 2025-05-19 PROCEDURE — 120N000001 HC R&B MED SURG/OB

## 2025-05-19 PROCEDURE — 258N000003 HC RX IP 258 OP 636: Performed by: COLON & RECTAL SURGERY

## 2025-05-19 PROCEDURE — 250N000011 HC RX IP 250 OP 636: Performed by: NURSE ANESTHETIST, CERTIFIED REGISTERED

## 2025-05-19 PROCEDURE — 250N000009 HC RX 250: Performed by: NURSE ANESTHETIST, CERTIFIED REGISTERED

## 2025-05-19 PROCEDURE — 250N000011 HC RX IP 250 OP 636: Mod: JZ | Performed by: STUDENT IN AN ORGANIZED HEALTH CARE EDUCATION/TRAINING PROGRAM

## 2025-05-19 PROCEDURE — S2900 ROBOTIC SURGICAL SYSTEM: HCPCS | Performed by: COLON & RECTAL SURGERY

## 2025-05-19 PROCEDURE — 360N000080 HC SURGERY LEVEL 7, PER MIN: Performed by: COLON & RECTAL SURGERY

## 2025-05-19 PROCEDURE — 250N000025 HC SEVOFLURANE, PER MIN: Performed by: COLON & RECTAL SURGERY

## 2025-05-19 PROCEDURE — 250N000011 HC RX IP 250 OP 636: Performed by: COLON & RECTAL SURGERY

## 2025-05-19 PROCEDURE — 370N000017 HC ANESTHESIA TECHNICAL FEE, PER MIN: Performed by: COLON & RECTAL SURGERY

## 2025-05-19 PROCEDURE — 258N000001 HC RX 258: Performed by: COLON & RECTAL SURGERY

## 2025-05-19 PROCEDURE — 250N000009 HC RX 250: Performed by: COLON & RECTAL SURGERY

## 2025-05-19 PROCEDURE — 0DUP4JZ SUPPLEMENT RECTUM WITH SYNTHETIC SUBSTITUTE, PERCUTANEOUS ENDOSCOPIC APPROACH: ICD-10-PCS | Performed by: COLON & RECTAL SURGERY

## 2025-05-19 PROCEDURE — 250N000009 HC RX 250: Performed by: STUDENT IN AN ORGANIZED HEALTH CARE EDUCATION/TRAINING PROGRAM

## 2025-05-19 PROCEDURE — 85049 AUTOMATED PLATELET COUNT: CPT | Performed by: COLON & RECTAL SURGERY

## 2025-05-19 PROCEDURE — 8E0W4CZ ROBOTIC ASSISTED PROCEDURE OF TRUNK REGION, PERCUTANEOUS ENDOSCOPIC APPROACH: ICD-10-PCS | Performed by: COLON & RECTAL SURGERY

## 2025-05-19 PROCEDURE — 272N000001 HC OR GENERAL SUPPLY STERILE: Performed by: COLON & RECTAL SURGERY

## 2025-05-19 PROCEDURE — 45400 LAPAROSCOPIC PROC: CPT | Performed by: COLON & RECTAL SURGERY

## 2025-05-19 PROCEDURE — 36415 COLL VENOUS BLD VENIPUNCTURE: CPT | Performed by: COLON & RECTAL SURGERY

## 2025-05-19 PROCEDURE — 250N000011 HC RX IP 250 OP 636: Performed by: STUDENT IN AN ORGANIZED HEALTH CARE EDUCATION/TRAINING PROGRAM

## 2025-05-19 PROCEDURE — C1781 MESH (IMPLANTABLE): HCPCS | Performed by: COLON & RECTAL SURGERY

## 2025-05-19 PROCEDURE — 250N000013 HC RX MED GY IP 250 OP 250 PS 637: Performed by: COLON & RECTAL SURGERY

## 2025-05-19 PROCEDURE — 250N000011 HC RX IP 250 OP 636: Mod: JZ | Performed by: COLON & RECTAL SURGERY

## 2025-05-19 DEVICE — IMPLANTABLE DEVICE: Type: IMPLANTABLE DEVICE | Site: ABDOMEN | Status: FUNCTIONAL

## 2025-05-19 RX ORDER — ONDANSETRON 2 MG/ML
4 INJECTION INTRAMUSCULAR; INTRAVENOUS ONCE
Status: COMPLETED | OUTPATIENT
Start: 2025-05-19 | End: 2025-05-19

## 2025-05-19 RX ORDER — FENTANYL CITRATE 50 UG/ML
50 INJECTION, SOLUTION INTRAMUSCULAR; INTRAVENOUS EVERY 5 MIN PRN
Status: DISCONTINUED | OUTPATIENT
Start: 2025-05-19 | End: 2025-05-19 | Stop reason: HOSPADM

## 2025-05-19 RX ORDER — HEPARIN SODIUM 5000 [USP'U]/.5ML
5000 INJECTION, SOLUTION INTRAVENOUS; SUBCUTANEOUS EVERY 8 HOURS
Status: DISCONTINUED | OUTPATIENT
Start: 2025-05-20 | End: 2025-05-20 | Stop reason: HOSPADM

## 2025-05-19 RX ORDER — MAGNESIUM HYDROXIDE 1200 MG/15ML
LIQUID ORAL PRN
Status: DISCONTINUED | OUTPATIENT
Start: 2025-05-19 | End: 2025-05-19 | Stop reason: HOSPADM

## 2025-05-19 RX ORDER — ONDANSETRON 4 MG/1
4 TABLET, ORALLY DISINTEGRATING ORAL EVERY 30 MIN PRN
Status: DISCONTINUED | OUTPATIENT
Start: 2025-05-19 | End: 2025-05-19 | Stop reason: HOSPADM

## 2025-05-19 RX ORDER — NALOXONE HYDROCHLORIDE 0.4 MG/ML
0.4 INJECTION, SOLUTION INTRAMUSCULAR; INTRAVENOUS; SUBCUTANEOUS
Status: DISCONTINUED | OUTPATIENT
Start: 2025-05-19 | End: 2025-05-20 | Stop reason: HOSPADM

## 2025-05-19 RX ORDER — ACETAMINOPHEN 325 MG/1
975 TABLET ORAL ONCE
Status: COMPLETED | OUTPATIENT
Start: 2025-05-19 | End: 2025-05-19

## 2025-05-19 RX ORDER — CYCLOBENZAPRINE HCL 10 MG
10 TABLET ORAL 3 TIMES DAILY
Status: DISCONTINUED | OUTPATIENT
Start: 2025-05-19 | End: 2025-05-20 | Stop reason: HOSPADM

## 2025-05-19 RX ORDER — HYDROMORPHONE HCL IN WATER/PF 6 MG/30 ML
0.4 PATIENT CONTROLLED ANALGESIA SYRINGE INTRAVENOUS EVERY 5 MIN PRN
Status: DISCONTINUED | OUTPATIENT
Start: 2025-05-19 | End: 2025-05-19 | Stop reason: HOSPADM

## 2025-05-19 RX ORDER — PROPOFOL 10 MG/ML
INJECTION, EMULSION INTRAVENOUS PRN
Status: DISCONTINUED | OUTPATIENT
Start: 2025-05-19 | End: 2025-05-19

## 2025-05-19 RX ORDER — SODIUM CHLORIDE, SODIUM LACTATE, POTASSIUM CHLORIDE, CALCIUM CHLORIDE 600; 310; 30; 20 MG/100ML; MG/100ML; MG/100ML; MG/100ML
INJECTION, SOLUTION INTRAVENOUS CONTINUOUS
Status: DISCONTINUED | OUTPATIENT
Start: 2025-05-19 | End: 2025-05-20 | Stop reason: HOSPADM

## 2025-05-19 RX ORDER — SODIUM CHLORIDE, SODIUM LACTATE, POTASSIUM CHLORIDE, CALCIUM CHLORIDE 600; 310; 30; 20 MG/100ML; MG/100ML; MG/100ML; MG/100ML
INJECTION, SOLUTION INTRAVENOUS CONTINUOUS
Status: DISCONTINUED | OUTPATIENT
Start: 2025-05-19 | End: 2025-05-19 | Stop reason: HOSPADM

## 2025-05-19 RX ORDER — OXYCODONE HYDROCHLORIDE 5 MG/1
10 TABLET ORAL EVERY 4 HOURS PRN
Status: DISCONTINUED | OUTPATIENT
Start: 2025-05-19 | End: 2025-05-20 | Stop reason: HOSPADM

## 2025-05-19 RX ORDER — PROCHLORPERAZINE MALEATE 10 MG
10 TABLET ORAL EVERY 6 HOURS PRN
Status: DISCONTINUED | OUTPATIENT
Start: 2025-05-19 | End: 2025-05-20 | Stop reason: HOSPADM

## 2025-05-19 RX ORDER — ONDANSETRON 4 MG/1
4 TABLET, ORALLY DISINTEGRATING ORAL EVERY 6 HOURS PRN
Status: DISCONTINUED | OUTPATIENT
Start: 2025-05-19 | End: 2025-05-20 | Stop reason: HOSPADM

## 2025-05-19 RX ORDER — NALOXONE HYDROCHLORIDE 0.4 MG/ML
0.2 INJECTION, SOLUTION INTRAMUSCULAR; INTRAVENOUS; SUBCUTANEOUS
Status: DISCONTINUED | OUTPATIENT
Start: 2025-05-19 | End: 2025-05-20 | Stop reason: HOSPADM

## 2025-05-19 RX ORDER — HYDROMORPHONE HCL IN WATER/PF 6 MG/30 ML
0.4 PATIENT CONTROLLED ANALGESIA SYRINGE INTRAVENOUS
Status: DISCONTINUED | OUTPATIENT
Start: 2025-05-19 | End: 2025-05-20 | Stop reason: HOSPADM

## 2025-05-19 RX ORDER — HYDROMORPHONE HCL IN WATER/PF 6 MG/30 ML
0.2 PATIENT CONTROLLED ANALGESIA SYRINGE INTRAVENOUS
Status: DISCONTINUED | OUTPATIENT
Start: 2025-05-19 | End: 2025-05-20 | Stop reason: HOSPADM

## 2025-05-19 RX ORDER — BUPIVACAINE HYDROCHLORIDE AND EPINEPHRINE 5; 5 MG/ML; UG/ML
INJECTION, SOLUTION PERINEURAL PRN
Status: DISCONTINUED | OUTPATIENT
Start: 2025-05-19 | End: 2025-05-19 | Stop reason: HOSPADM

## 2025-05-19 RX ORDER — CEFAZOLIN SODIUM/WATER 2 G/20 ML
2 SYRINGE (ML) INTRAVENOUS SEE ADMIN INSTRUCTIONS
Status: DISCONTINUED | OUTPATIENT
Start: 2025-05-19 | End: 2025-05-19 | Stop reason: HOSPADM

## 2025-05-19 RX ORDER — ONDANSETRON 2 MG/ML
4 INJECTION INTRAMUSCULAR; INTRAVENOUS EVERY 30 MIN PRN
Status: DISCONTINUED | OUTPATIENT
Start: 2025-05-19 | End: 2025-05-19 | Stop reason: HOSPADM

## 2025-05-19 RX ORDER — OXYCODONE HYDROCHLORIDE 5 MG/1
5 TABLET ORAL EVERY 4 HOURS PRN
Status: DISCONTINUED | OUTPATIENT
Start: 2025-05-19 | End: 2025-05-20 | Stop reason: HOSPADM

## 2025-05-19 RX ORDER — LIDOCAINE 40 MG/G
CREAM TOPICAL
Status: DISCONTINUED | OUTPATIENT
Start: 2025-05-19 | End: 2025-05-20 | Stop reason: HOSPADM

## 2025-05-19 RX ORDER — DEXAMETHASONE SODIUM PHOSPHATE 4 MG/ML
INJECTION, SOLUTION INTRA-ARTICULAR; INTRALESIONAL; INTRAMUSCULAR; INTRAVENOUS; SOFT TISSUE PRN
Status: DISCONTINUED | OUTPATIENT
Start: 2025-05-19 | End: 2025-05-19

## 2025-05-19 RX ORDER — IBUPROFEN 600 MG/1
600 TABLET, FILM COATED ORAL EVERY 6 HOURS
Status: DISCONTINUED | OUTPATIENT
Start: 2025-05-20 | End: 2025-05-20 | Stop reason: HOSPADM

## 2025-05-19 RX ORDER — KETOROLAC TROMETHAMINE 15 MG/ML
15 INJECTION, SOLUTION INTRAMUSCULAR; INTRAVENOUS EVERY 6 HOURS
Status: COMPLETED | OUTPATIENT
Start: 2025-05-19 | End: 2025-05-20

## 2025-05-19 RX ORDER — CEFAZOLIN SODIUM/WATER 2 G/20 ML
2 SYRINGE (ML) INTRAVENOUS
Status: COMPLETED | OUTPATIENT
Start: 2025-05-19 | End: 2025-05-19

## 2025-05-19 RX ORDER — ACETAMINOPHEN 500 MG
1000 TABLET ORAL EVERY 6 HOURS PRN
Qty: 100 TABLET | Refills: 0 | Status: CANCELLED | OUTPATIENT
Start: 2025-05-19

## 2025-05-19 RX ORDER — HYDROMORPHONE HCL IN WATER/PF 6 MG/30 ML
0.2 PATIENT CONTROLLED ANALGESIA SYRINGE INTRAVENOUS EVERY 5 MIN PRN
Status: DISCONTINUED | OUTPATIENT
Start: 2025-05-19 | End: 2025-05-19 | Stop reason: HOSPADM

## 2025-05-19 RX ORDER — LIDOCAINE HYDROCHLORIDE 20 MG/ML
INJECTION, SOLUTION INFILTRATION; PERINEURAL PRN
Status: DISCONTINUED | OUTPATIENT
Start: 2025-05-19 | End: 2025-05-19

## 2025-05-19 RX ORDER — ACETAMINOPHEN 500 MG
1000 TABLET ORAL EVERY 6 HOURS
Status: DISCONTINUED | OUTPATIENT
Start: 2025-05-19 | End: 2025-05-20

## 2025-05-19 RX ORDER — FENTANYL CITRATE 50 UG/ML
25 INJECTION, SOLUTION INTRAMUSCULAR; INTRAVENOUS EVERY 5 MIN PRN
Status: DISCONTINUED | OUTPATIENT
Start: 2025-05-19 | End: 2025-05-19 | Stop reason: HOSPADM

## 2025-05-19 RX ORDER — ONDANSETRON 2 MG/ML
INJECTION INTRAMUSCULAR; INTRAVENOUS PRN
Status: DISCONTINUED | OUTPATIENT
Start: 2025-05-19 | End: 2025-05-19

## 2025-05-19 RX ORDER — SIMETHICONE 80 MG
80 TABLET,CHEWABLE ORAL 4 TIMES DAILY PRN
Status: DISCONTINUED | OUTPATIENT
Start: 2025-05-19 | End: 2025-05-20 | Stop reason: HOSPADM

## 2025-05-19 RX ORDER — HEPARIN SODIUM 5000 [USP'U]/.5ML
5000 INJECTION, SOLUTION INTRAVENOUS; SUBCUTANEOUS
Status: COMPLETED | OUTPATIENT
Start: 2025-05-19 | End: 2025-05-19

## 2025-05-19 RX ORDER — DEXAMETHASONE SODIUM PHOSPHATE 4 MG/ML
4 INJECTION, SOLUTION INTRA-ARTICULAR; INTRALESIONAL; INTRAMUSCULAR; INTRAVENOUS; SOFT TISSUE
Status: DISCONTINUED | OUTPATIENT
Start: 2025-05-19 | End: 2025-05-19 | Stop reason: HOSPADM

## 2025-05-19 RX ORDER — FENTANYL CITRATE 50 UG/ML
INJECTION, SOLUTION INTRAMUSCULAR; INTRAVENOUS PRN
Status: DISCONTINUED | OUTPATIENT
Start: 2025-05-19 | End: 2025-05-19

## 2025-05-19 RX ORDER — NALOXONE HYDROCHLORIDE 0.4 MG/ML
0.1 INJECTION, SOLUTION INTRAMUSCULAR; INTRAVENOUS; SUBCUTANEOUS
Status: DISCONTINUED | OUTPATIENT
Start: 2025-05-19 | End: 2025-05-19 | Stop reason: HOSPADM

## 2025-05-19 RX ORDER — ONDANSETRON 2 MG/ML
4 INJECTION INTRAMUSCULAR; INTRAVENOUS EVERY 6 HOURS PRN
Status: DISCONTINUED | OUTPATIENT
Start: 2025-05-19 | End: 2025-05-20 | Stop reason: HOSPADM

## 2025-05-19 RX ORDER — METRONIDAZOLE 500 MG/100ML
500 INJECTION, SOLUTION INTRAVENOUS
Status: COMPLETED | OUTPATIENT
Start: 2025-05-19 | End: 2025-05-19

## 2025-05-19 RX ORDER — BUPROPION HYDROCHLORIDE 150 MG/1
300 TABLET ORAL EVERY MORNING
Status: DISCONTINUED | OUTPATIENT
Start: 2025-05-20 | End: 2025-05-20 | Stop reason: HOSPADM

## 2025-05-19 RX ORDER — PROPOFOL 10 MG/ML
INJECTION, EMULSION INTRAVENOUS CONTINUOUS PRN
Status: DISCONTINUED | OUTPATIENT
Start: 2025-05-19 | End: 2025-05-19

## 2025-05-19 RX ORDER — EPHEDRINE SULFATE 50 MG/ML
INJECTION, SOLUTION INTRAMUSCULAR; INTRAVENOUS; SUBCUTANEOUS PRN
Status: DISCONTINUED | OUTPATIENT
Start: 2025-05-19 | End: 2025-05-19

## 2025-05-19 RX ADMIN — CYCLOBENZAPRINE 10 MG: 10 TABLET, FILM COATED ORAL at 16:11

## 2025-05-19 RX ADMIN — KETOROLAC TROMETHAMINE 15 MG: 15 INJECTION, SOLUTION INTRAMUSCULAR; INTRAVENOUS at 15:03

## 2025-05-19 RX ADMIN — HYDROMORPHONE HYDROCHLORIDE 0.2 MG: 0.2 INJECTION, SOLUTION INTRAMUSCULAR; INTRAVENOUS; SUBCUTANEOUS at 12:32

## 2025-05-19 RX ADMIN — MIDAZOLAM 2 MG: 1 INJECTION INTRAMUSCULAR; INTRAVENOUS at 07:40

## 2025-05-19 RX ADMIN — METRONIDAZOLE 500 MG: 500 INJECTION, SOLUTION INTRAVENOUS at 06:39

## 2025-05-19 RX ADMIN — SODIUM CHLORIDE, SODIUM LACTATE, POTASSIUM CHLORIDE, AND CALCIUM CHLORIDE: .6; .31; .03; .02 INJECTION, SOLUTION INTRAVENOUS at 09:25

## 2025-05-19 RX ADMIN — LIDOCAINE HYDROCHLORIDE 100 MG: 20 INJECTION, SOLUTION INFILTRATION; PERINEURAL at 07:51

## 2025-05-19 RX ADMIN — KETOROLAC TROMETHAMINE 15 MG: 15 INJECTION, SOLUTION INTRAMUSCULAR; INTRAVENOUS at 21:04

## 2025-05-19 RX ADMIN — HEPARIN SODIUM 5000 UNITS: 5000 INJECTION, SOLUTION INTRAVENOUS; SUBCUTANEOUS at 06:49

## 2025-05-19 RX ADMIN — Medication 200 MG: at 10:55

## 2025-05-19 RX ADMIN — ROCURONIUM BROMIDE 20 MG: 50 INJECTION, SOLUTION INTRAVENOUS at 08:53

## 2025-05-19 RX ADMIN — HYDROMORPHONE HYDROCHLORIDE 0.2 MG: 0.2 INJECTION, SOLUTION INTRAMUSCULAR; INTRAVENOUS; SUBCUTANEOUS at 14:04

## 2025-05-19 RX ADMIN — DEXAMETHASONE SODIUM PHOSPHATE 4 MG: 4 INJECTION, SOLUTION INTRA-ARTICULAR; INTRALESIONAL; INTRAMUSCULAR; INTRAVENOUS; SOFT TISSUE at 08:42

## 2025-05-19 RX ADMIN — ONDANSETRON 4 MG: 2 INJECTION INTRAMUSCULAR; INTRAVENOUS at 06:49

## 2025-05-19 RX ADMIN — FENTANYL CITRATE 25 MCG: 0.05 INJECTION, SOLUTION INTRAMUSCULAR; INTRAVENOUS at 11:48

## 2025-05-19 RX ADMIN — FENTANYL CITRATE 25 MCG: 0.05 INJECTION, SOLUTION INTRAMUSCULAR; INTRAVENOUS at 11:54

## 2025-05-19 RX ADMIN — HYDROMORPHONE HYDROCHLORIDE 0.5 MG: 1 INJECTION, SOLUTION INTRAMUSCULAR; INTRAVENOUS; SUBCUTANEOUS at 09:30

## 2025-05-19 RX ADMIN — Medication 2 G: at 07:51

## 2025-05-19 RX ADMIN — PROPOFOL 20 MCG/KG/MIN: 10 INJECTION, EMULSION INTRAVENOUS at 08:00

## 2025-05-19 RX ADMIN — Medication 5 MG: at 08:31

## 2025-05-19 RX ADMIN — SODIUM CHLORIDE, SODIUM LACTATE, POTASSIUM CHLORIDE, AND CALCIUM CHLORIDE: .6; .31; .03; .02 INJECTION, SOLUTION INTRAVENOUS at 06:50

## 2025-05-19 RX ADMIN — ROCURONIUM BROMIDE 50 MG: 50 INJECTION, SOLUTION INTRAVENOUS at 07:51

## 2025-05-19 RX ADMIN — ONDANSETRON 4 MG: 2 INJECTION INTRAMUSCULAR; INTRAVENOUS at 10:43

## 2025-05-19 RX ADMIN — SODIUM CHLORIDE, SODIUM LACTATE, POTASSIUM CHLORIDE, AND CALCIUM CHLORIDE: .6; .31; .03; .02 INJECTION, SOLUTION INTRAVENOUS at 14:04

## 2025-05-19 RX ADMIN — PROPOFOL 200 MG: 10 INJECTION, EMULSION INTRAVENOUS at 07:51

## 2025-05-19 RX ADMIN — FENTANYL CITRATE 100 MCG: 50 INJECTION INTRAMUSCULAR; INTRAVENOUS at 07:51

## 2025-05-19 RX ADMIN — CYCLOBENZAPRINE 10 MG: 10 TABLET, FILM COATED ORAL at 21:04

## 2025-05-19 ASSESSMENT — ACTIVITIES OF DAILY LIVING (ADL)
ADLS_ACUITY_SCORE: 23
ADLS_ACUITY_SCORE: 27
ADLS_ACUITY_SCORE: 19
ADLS_ACUITY_SCORE: 21
ADLS_ACUITY_SCORE: 25
ADLS_ACUITY_SCORE: 23
ADLS_ACUITY_SCORE: 42
ADLS_ACUITY_SCORE: 19
ADLS_ACUITY_SCORE: 23
ADLS_ACUITY_SCORE: 19
ADLS_ACUITY_SCORE: 23
ADLS_ACUITY_SCORE: 21
ADLS_ACUITY_SCORE: 23
ADLS_ACUITY_SCORE: 27
ADLS_ACUITY_SCORE: 19
ADLS_ACUITY_SCORE: 19

## 2025-05-19 NOTE — DISCHARGE SUMMARY
[facility]  Discharge Summary  Colon and Rectal Surgery     Patient: Margo Chris MRN# [id number]   YOB: 1965 Age: 60 year old     Date of Admission:  5/19/2025  Date of Discharge::  5/20/2025  Admitting Physician:  Tanvi Ceron MD  Discharge Physician:  Tanvi Ceron MD  Primary Care Physician:         Juana Ref-Primary, Physician          Admission Diagnoses:     Rectal prolapse [K62.3]  Obstructive defecation  Vaginal vault prolapse  Gilbert's syndrome  HLD  Chronic depression          Discharge Diagnosis:     As above and:  S/p ventral mesh rectopexy 5/19/25  Acute postoperative pain          Procedures:     Procedure/Surgery Information   Surgery: Procedure(s):  Robotic ventral mesh rectopexy   Surgery Date: 5/19/2025   Surgeon(s): Surgeons and Role:     * Tanvi Ceron MD - Primary     * Debora Hickey MD - Assisting   Specimens: * No specimens in log *   Non-operative procedures None performed              Consultations:   None         Imaging Studies:     Results for orders placed or performed in visit on 12/12/24   XR Defecography    Narrative    XR Defecogram 12/12/2024 3:50 PM    INDICATION: Chronic constipation; Abnormal defecation; Vaginal vault  prolapse; Myalgia of pelvic floor; Pelvic floor dysfunction    FLUOROSCOPY TIME: .9 minutes    FINDINGS:     AP radiograph of lower abdomen and pelvis is unremarkable, with  no abnormalities evident.    Approximately 200 cc of thick barium inserted into rectum. Barium  paste was also inserted into the vagina. A small BB marker sticker was  placed on the perineal body. A barium tablet was placed on the  patient's thigh for calibration purposes. The patient was then  positioned in a Commode chair in lateral projection. Video and spot  images obtained in resting, maximal retention, straining, evacuating  and post evacuation states.    Ability to retain contrast: No, rectal incontinence  with straining.     Organ configuration and position at rest (relative to  sacrum/coccyx/pubic bone): Abnormal descent of the anorectal junction  measuring 7.6 cm below the pubococcygeal line.. Anorectal angle was in  the range of 90 degrees  .    Squeeze and strain: Anorectal angle decreased with maximal  squeezing/retention. Anorectal angle increased appropriately with  straining. No incontinence with straining.    Defecation/evacuation:  Initiation of evacuation was effortless and  accomplished with abnormal descent of pelvic floor.  Evacuation was  complete. There was not paradoxical contraction of sphincter during  evacuation. Anorectal angle increases with evacuation.    Intussusception: Yes. Intussusception involving the rectum and  rectocele into the anal canal demonstrated progressing to prolapse.    Prolapse: Yes    Rectocele: Small anterior rectocele with defecation. No retained  contrast within the anterior rectocele at the end of defecation.    Vaginal [place]: Normal position of the vaginal apex at rest. Abnormal  vaginal descent with defecation measuring 4.0 cm below the  pubococcygeal line. There is bowing of the vaginal canal with  defecation, presumably due to mass effect from the bladder, suggesting  cystocele. Spillage of contrast with straining/defecation.    Extra rectal structures (if visible): None    Other findings: None.      Impression    IMPRESSION:  1.  Moderate abnormal rectal descent at rest.  2.  Rectal prolapse.  3.  Complete evacuation. Mild rectal incontinence with straining.  4.  Small anterior rectocele with defecation without retained contrast  at the end of defecation.  5.  Vaginal descent during defecation. Findings suggestive of  cystocele.    I have personally reviewed the examination and initial interpretation  and I agree with the findings.    MAYUR KERN MD         SYSTEM ID:  [id number]             Medications Prior to Admission:     Medications Prior to Admission    Medication Sig Dispense Refill Last Dose/Taking    buPROPion (WELLBUTRIN XL) 300 MG 24 hr tablet Take 1 tablet (300 mg) by mouth every morning. 90 tablet 5 Morning    Multiple Vitamins-Minerals (MULTIVITAMIN ADULT) CHEW Take 2 chew tab by mouth daily   5/12/2025 Morning            Discharge Medications:     Current Discharge Medication List        START taking these medications    Details   ibuprofen (ADVIL/MOTRIN) 600 MG tablet Take 1 tablet (600 mg) by mouth every 6 hours as needed (pain).  Qty: 60 tablet, Refills: 0    Associated Diagnoses: Acute post-operative pain      methocarbamol (ROBAXIN) 500 MG tablet Take 1 tablet (500 mg) by mouth every 6 hours as needed for muscle spasms or other (postoperative pain).  Qty: 40 tablet, Refills: 0    Associated Diagnoses: Acute post-operative pain      oxyCODONE (ROXICODONE) 5 MG tablet Take 1 tablet (5 mg) by mouth every 6 hours as needed for severe pain.  Qty: 5 tablet, Refills: 0    Associated Diagnoses: Acute post-operative pain      polyethylene glycol (MIRALAX) 17 GM/Dose powder Take 17 g (1 Capful) by mouth daily.  Qty: 510 g, Refills: 0    Associated Diagnoses: History of rectopexy      senna-docusate (SENOKOT-S/PERICOLACE) 8.6-50 MG tablet Take 1 tablet by mouth daily as needed for constipation (while taking oxycodone).  Qty: 5 tablet, Refills: 0    Associated Diagnoses: History of rectopexy           CONTINUE these medications which have NOT CHANGED    Details   buPROPion (WELLBUTRIN XL) 300 MG 24 hr tablet Take 1 tablet (300 mg) by mouth every morning.  Qty: 90 tablet, Refills: 5    Associated Diagnoses: Depression, unspecified depression type      Multiple Vitamins-Minerals (MULTIVITAMIN ADULT) CHEW Take 2 chew tab by mouth daily                    Brief History of Illness:     Margo Chris is a 60 year old female who has fairly significant obstructed defecation. She presented to clinic and underwent an evaluation revealing: an early rectal prolapse  as well as some components of vaginal prolapse. She has seen Dr. Ceron and Dr. Pierce, and given the vaginal prolapse is asymptomatic, would prefer only a posterior repair. She is now s/p robotic ventral mesh rectopexy on 5/19/25.           Hospital Course:     Postoperatively patient was gently fluid resuscitated. Baltazar catheter was removed on POD0 and patient was able to void spontaneously. Postoperative pain was controlled with NSAIDs, robaxin, and oxycodone/dilaudid. Patient had eventual return of bowel function and was able to tolerate a regular diet.    Patient is to follow up in the Colon and Rectal Surgery Clinic in 2-3 weeks with an JOANIE and then with Dr. Tanvi Ceron 2-3 weeks after.            Day of Discharge Physical Exam:     Blood pressure 101/56, pulse 69, temperature 99  F (37.2  C), temperature source Oral, resp. rate 16, height 1.524 m (5'), weight 60 kg (132 lb 4.8 oz), last menstrual period 01/26/2018, SpO2 98%, not currently breastfeeding.  General: alert, oriented, no acute distress  Respiratory: non-labored breathing on RA  Abdomen: soft, non-distended, appropriately tender without guarding, laparoscopic incisions all well approximated without erythema or drainage, skin glue in place          Final Pathology Result:     No pathology submitted           Discharge Instructions and Follow-Up:     Discharge Procedure Orders   Reason for your hospital stay   Order Comments: You were admitted to the hospital for postoperative recovery after surgery.     Follow Up   Order Comments: - You are already scheduled for postoperative follow up in the Colon and Rectal Surgery Clinic on 6/3/25 and 6/26/25.   - Follow up with your primary care provider in 1-2 weeks after discharge from the hospital to review this hospitalization.       6/3/2025  1:00 PM POST-OP 30 min [facility]ON [facility] RE[facility]AL [facility] Krystal Myrick, APRN CNP              6/26/2025 11:45 AM POST-OP 15 min [facility] COLON [facility] RECTAL SUR[facility]  Tanvi Ceron MD     Activity   Order Comments: - No lifting, pushing, pulling greater than 10 lbs for 6 weeks after surgery.  - No strenuous exercise for 6 weeks after surgery.  - We encourage walking at least 4-5 times per day.  - Do not drive while taking narcotic pain medication (such as Percocet, oxycodone, Vicodin).  - No driving until you are able to fully twist to both sides or slam on brakes quickly and without any pain.  - Avoid straining, bearing down, and any valsalva maneuvers.  - Do not insert anything into your vagina, anus or rectum for at least 6 weeks or unless discussed with Urogynecology or Colorectal Surgery.     Order Specific Question Answer Comments   Is discharge order? Yes      When to contact your care team   Order Comments: Please contact the Colorectal Nurse Care Coordinators (Hafsa Ayoub RN, Jina Campbell RN, or Daniela Massey RN) at [phone] for problems after discharge such as:  - Temperature > 101F, chills, rigors, dizziness  - Redness around or pus-like drainage from surgical wounds  - Inability to tolerate diet, nausea or vomiting  - You stop passing stool and gas, develop significant abdominal pain/bloating  - You have dark and low volume urine  - Have blood in stools/vomit  - Have severe diarrhea/constipation  - Any other questions or concerns.  - At nights (after 4:30pm), on weekends, or if urgent, call [phone] and ask the  to speak with the on-call Colorectal Surgery resident or fellow     Wound care and dressings   Order Comments: - Inspect your wounds daily for signs of infection (increased redness, drainage, pain).  - Keep your wound(s) clean and dry.  - You may shower normally. Remove bandages or dressings before showering and replace after drying. Let soapy water run down the incisions. Do not scrub at your incisions. Pat dry with a clean towel.  - Do not soak (in a bath, hot tub, pool, etc) or swim until cleared by your surgery team.  - Your  incisions were closed with a dissolvable suture under the skin that does not need to be removed. There is skin glue on top that will peel/flake off on its own in a few weeks. Avoid picking and peeling at the glue.     Diet   Order Comments: - Follow a regular diet after discharge.  - Eat slowly, chew thoroughly, and have small frequent meals throughout the day.  - Stay well hydrated.     Order Specific Question Answer Comments   Is discharge order? Yes             Home Health Care:     Not needed           Discharge Disposition:     Discharged to home      Condition at discharge: Stable    The above plan of care was performed and communicated to me by CRS Staff Dr. Tanvi Ceron.    Karishma Dee PA-C  Colon and Rectal Surgery  [facility]

## 2025-05-19 NOTE — OP NOTE
**This note replaces a dictated operative procedure note**    SURGEON  JEVON SETHI MD    ASSISTANT  LUCIA CLARKE M.D.  KRISTIN DALLAS PA-C    PREOPERATIVE DIAGNOSIS: 1) Rectal prolapse    POSTOPERATIVE DIAGNOSIS: 2) Rectal prolapse    PROCEDURE  Robotic ventral rectopexy with mesh, rigid proctoscopy.    ANESTHESIA  General.    ESTIMATED BLOOD LOSS  15 ml    FINDINGS: Very redundant sigmoid colon, unclear clinical significance    INDICATIONS:  The patient is a 60  year-old female who has fairlv significant obstructed defecation. She presented to clinic and underwent an evaluation revealing: an early rectal prolapse as well as some components of vaginal prolapse.  She has seen myself and Dr. Pierce, and given the vaginal prolapse is asymptomatic, would prefer only a posterior repair.. Given her persistent symptoms, I have recommended operative repair. We discussed the use of mesh to repair the posterior prolapse. We discussed the 3% risk of mesh erosion or infection. We discussed the 5% recurrence rate at 5 ears and 10% anatomic recurrence rate at 10 years. The patient understands the procedure, the recovery, the risks and benefits, and agrees to proceed.    PROCEDURE DETAILS  The patient was brought into the operating room. She was placed under general anesthesia. She was placed in lithotomy position on a pink pad with both arms tucked. The abdomen was prepped and draped in the usual sterile manner. Access to the abdomen was obtained by: a failed Veress attempt at a supraumbilical incision as well as at Glynn's point, most likely given very stretchy peritoneum.  We then easily got into the abdomen through a transverse supraumbilical incision using an 8 mm robotic trocar with a 5-0 camera in a visiport technique.  A pneumoperitoneum was installed. Generalized laparoscopy revealed no contraindications to minimally invasive surgery nor injury from entrance technique. A transabdominal plane  block was then performed by identifying the anterior axillary line on both left and right sides. In the prepertoneal space in the transversalis fascia, 7.5 cc of 1/2 % Marcaine with epinephrine was injected 2 cm cephalad to the midpoint and 2 cm caudad to the midpoint, on both left and right sides for a total of a 30 cc bilateral block. Robotic trocars were then placed in the left lateral quadrant, left mid quadrant, right mid quadrant and a 5 mm accessory AirSeal port was placed in the right lateral quadrant, all around the level of the umbilicus The patient was put in steep Trendelenburg. The sacral promontory was identified. The robot was then docked.     Once we took control at the console, we first sharply lysed some sigmoid colon adhesions off the vaginal cuff in order to retract the sigmoid out of the pelvis.  We then scored the peritoneum over the sacral promontory and carefully dissected this out. The right ureter was seen quite well throughout the dissection. We then took down the attachments of the rectum on the right side and slightly  posteriorly  down to the level of the pelvic floor.  We  did not take the middle rectal vessels or these ligaments to prevent any nerve dysfunction.     The rectovaginal septum was entered and dissected distally down to the level of the anal canal, ensuring I could see the levators on both left and right sides. A piece of Gynemesh was fashioned with a 5 cm wide X 6 cm length head for the rectal attachment, with 18 cm in length was then secured to the distal aspect of the mid rectum with a 3-0 prolene suture.  An additional 3-0 prolene suture was used to secure the head of the mesh just below the peritoneal reflexion with a second 3-0 prolene suture.  Seven additional 3-0 PDS sutures were then used to fashion the mesh against the rectum. A posterior colpopexy was not performed  given the patient may need a sacrocolpopexy in the future.     Two separate 2-0 prolene sutures  were then placed through the longitudinal ligament on the sacral promontory.  They were then placed through the mesh and under adequate tension. These were then tied down securely. Excess mesh was then trimmed removed. A single posterior rectopexy suture was placed using an additional 2-0 prolene to support the posterior mesorectum to the longitudinal ligament on the sacral promontory.   The repair was then reperitonealized using  3-0 absorbable barbed suture. Once this was done,  I went below and pertormed proctoscopy. There was no evidence or air leak and no intraluminal stitches were identified. The repair felt quite good. At this point. we removed our instruments. We undocked the robot. Our pneumoperitoneum was removed. All trocars were removed. All skin incisions were then reapproximated with running 4-0 Monocryl in a subcuticular manner. The wounds were cleaned and dressed with Dermabond.    The patient tolerated the procedure well with minimal blood loss and without intraoperative complication. The patient went to recover in stabe condition. Sponge an instrument counts were correct x2 at the end or the procedure.    Tanvi Ceron MD    Division of Colon & Rectal Surgery  [facility]  [phone] (p)  [phone] (cell)

## 2025-05-19 NOTE — ANESTHESIA CARE TRANSFER NOTE
Patient: Margo Chris    Procedure: Procedure(s):  Robotic ventral mesh rectopexy       Diagnosis: Rectal prolapse [K62.3]  Diagnosis Additional Information: No value filed.    Anesthesia Type:   General     Note:    Oropharynx: oropharynx clear of all foreign objects  Level of Consciousness: awake  Oxygen Supplementation: face mask    Independent Airway: airway patency satisfactory and stable  Dentition: dentition unchanged  Vital Signs Stable: post-procedure vital signs reviewed and stable  Report to RN Given: handoff report given  Patient transferred to: PACU    Handoff Report: Identifed the Patient, Identified the Reponsible Provider, Reviewed the pertinent medical history, Discussed the surgical course, Reviewed Intra-OP anesthesia mangement and issues during anesthesia, Set expectations for post-procedure period and Allowed opportunity for questions and acknowledgement of understanding        Electronically Signed By: KEVIN Baugh CRNA  May 19, 2025  11:08 AM

## 2025-05-19 NOTE — ANESTHESIA PREPROCEDURE EVALUATION
Anesthesia Pre-Procedure Evaluation    Patient: Margo Chris   MRN: [id number] : 1965          Procedure : Procedure(s):  Robotic ventral mesh rectopexy         Past Medical History:   Diagnosis Date    Chronic depression     Gilbert's syndrome     Other and unspecified hyperlipidemia     Rectal prolapse       Past Surgical History:   Procedure Laterality Date    COLONOSCOPY N/A 3/27/2025    Procedure: Colonoscopy;  Surgeon: Debora Hickey MD;  Location:  GI    HC TOOTH EXTRACTION W/FORCEP      LAPAROSCOPIC ASSISTED HYSTERECTOMY VAGINAL N/A 2018    Procedure: LAPAROSCOPIC ASSISTED HYSTERECTOMY VAGINAL;  LAPAROSCOPIC ASSISTED VAGINAL HYSTERECTOMY BILATERAL SALPINGECTOMY ;  Surgeon: Hiwot Tim MD;  Location: [facility]    LAPAROSCOPIC SALPINGECTOMY Bilateral 2018    Procedure: LAPAROSCOPIC SALPINGECTOMY;;  Surgeon: Hiwot Tim MD;  Location: [facility]      Allergies   Allergen Reactions    Food      Apricot, mangos, peaches    Sulfa Antibiotics Rash      Social History     Tobacco Use    Smoking status: Never    Smokeless tobacco: Never   Substance Use Topics    Alcohol use: Not Currently      Wt Readings from Last 1 Encounters:   25 60 kg (132 lb 4.8 oz)        Anesthesia Evaluation            ROS/MED HX  ENT/Pulmonary:    (-) sleep apnea   Neurologic: Comment: Chronic fatigue syndrome   (-) migraines   Cardiovascular:     (+) Dyslipidemia - -   -  - -                                      METS/Exercise Tolerance:     Hematologic:       Musculoskeletal:       GI/Hepatic: Comment: Gilbert syndrome   (-) GERD   Renal/Genitourinary:    (-) renal disease   Endo:     (+)          thyroid problem, hypothyroidism,        (-) Type II DM and obesity   Psychiatric/Substance Use:       Infectious Disease:       Malignancy:       Other: Comment: urticaria             Physical Exam  Airway  Mallampati: II  TM distance: >3 FB  Neck ROM: full  Mouth opening: >= 4 cm    Cardiovascular -  "normal exam   Dental   (+) Minor Abnormalities - some fillings, tiny chips      Pulmonary - normal exam      Neurological - normal exam  She appears awake, alert and oriented x3.    Other Findings       OUTSIDE LABS:  CBC:   Lab Results   Component Value Date    WBC 4.9 05/01/2025    HGB 13.2 05/01/2025    HGB 12.4 02/17/2018    HCT 39.5 05/01/2025     05/01/2025     BMP:   Lab Results   Component Value Date     05/01/2025     04/09/2024    POTASSIUM 4.9 05/01/2025    POTASSIUM 4.3 04/09/2024    CHLORIDE 104 05/01/2025    CHLORIDE 104 04/09/2024    CO2 31 (H) 05/01/2025    CO2 27 04/09/2024    BUN 14.4 05/01/2025    BUN 14.6 04/09/2024    CR 0.82 05/01/2025    CR 0.77 04/09/2024    GLC 95 05/01/2025     (H) 04/09/2024     COAGS: No results found for: \"PTT\", \"INR\", \"FIBR\"  POC:   Lab Results   Component Value Date    HCG Negative 02/16/2018     HEPATIC:   Lab Results   Component Value Date    ALBUMIN 4.6 05/01/2025    PROTTOTAL 7.5 05/01/2025    ALT 16 05/01/2025    AST 20 05/01/2025    ALKPHOS 88 05/01/2025    BILITOTAL 0.9 05/01/2025     OTHER:   Lab Results   Component Value Date    A1C 4.7 04/09/2024    DORINA 10.0 05/01/2025    TSH 1.46 04/09/2024       Anesthesia Plan    ASA Status:  2      NPO Status: NPO Appropriate   Anesthesia Type: General.  Airway: oral.  Induction: intravenous.  Maintenance: Balanced.   Techniques and Equipment:       - Monitoring Plan: standard ASA monitoring, train of four monitoring     Consents    Anesthesia Plan(s) and associated risks, benefits, and realistic alternatives discussed. Questions answered and patient/representative(s) expressed understanding.     - Discussed: anesthesiologist, CRNA     - Discussed with:  Patient               Postoperative Care    Pain management: non-narcotic analgesics, plan for postoperative opioid use, multimodal analgesia.     Comments:                   Colby Cobian MD    I have reviewed the pertinent notes and labs in " the chart from the past 30 days and (re)examined the patient.  Any updates or changes from those notes are reflected in this note.    Clinically Significant Risk Factors Present on Admission                             # Overweight: Estimated body mass index is 25.84 kg/m  as calculated from the following:    Height as of this encounter: 1.524 m (5').    Weight as of this encounter: 60 kg (132 lb 4.8 oz).

## 2025-05-19 NOTE — ANESTHESIA POSTPROCEDURE EVALUATION
Patient: Margo Chris    Procedure: Procedure(s):  Robotic ventral mesh rectopexy       Anesthesia Type:  General    Note:  Disposition: Inpatient   Postop Pain Control: Uneventful            Sign Out: Well controlled pain   PONV: No   Neuro/Psych: Uneventful            Sign Out: Acceptable/Baseline neuro status   Airway/Respiratory: Uneventful            Sign Out: Acceptable/Baseline resp. status   CV/Hemodynamics: Uneventful            Sign Out: Acceptable CV status   Other NRE: NONE   DID A NON-ROUTINE EVENT OCCUR?            Last vitals:  Vitals Value Taken Time   /80 05/19/25 13:15   Temp 36.2  C (97.1  F) 05/19/25 11:10   Pulse 86 05/19/25 13:17   Resp 9 05/19/25 13:17   SpO2 99 % 05/19/25 13:16   Vitals shown include unfiled device data.    Electronically Signed By: Colby Cobian MD  May 19, 2025  1:49 PM

## 2025-05-19 NOTE — INTERVAL H&P NOTE
I have reviewed the surgical (or preoperative) H&P that is linked to this encounter, and examined the patient. There are no significant changes    Clinical Conditions Present on Arrival:  Clinically Significant Risk Factors Present on Admission                           # Overweight: Estimated body mass index is 25.84 kg/m  as calculated from the following:    Height as of this encounter: 1.524 m (5').    Weight as of this encounter: 60 kg (132 lb 4.8 oz).

## 2025-05-19 NOTE — ANESTHESIA PROCEDURE NOTES
Airway       Patient location during procedure: OR       Procedure Start/Stop Times: 5/19/2025 7:53 AM  Staff -        Anesthesiologist:  Colby Cobian MD       CRNA: Michaela Price APRN CRNA       Other Anesthesia Staff: Eran Banuelos       Performed By: CRNA  Consent for Airway        Urgency: elective  Indications and Patient Condition       Indications for airway management: zakiya-procedural       Induction type:intravenous       Mask difficulty assessment: 2 - vent by mask + OA or adjuvant +/- NMBA    Final Airway Details       Final airway type: endotracheal airway       Successful airway: ETT - single  Endotracheal Airway Details        ETT size (mm): 7.0       Cuffed: yes       Successful intubation technique: direct laryngoscopy       DL Blade Type: MAC 3       Grade View of Cords: 2       Adjucts: stylet       Position: Right       Measured from: gums/teeth       Secured at (cm): 22       Bite block used: None    Post intubation assessment        Placement verified by: capnometry, equal breath sounds and chest rise        Number of attempts at approach: 1       Secured with: tape       Ease of procedure: easy       Dentition: Intact and Unchanged    Medication(s) Administered   Medication Administration Time: 5/19/2025 7:53 AM

## 2025-05-19 NOTE — PLAN OF CARE
Date & Time: 5/19/25 9233-5619  Surgery/POD#: POD 0 Robotic ventral rectopexy w/mesh  Behavior & Aggression: Green  Fall Risk: Yes  Orientation:A&Ox4  ABNL VS/O2:VSS on Ra, refused capno  ABNL Labs: See results  Pain Management:PRN IV dilaudid x1, Scheduled toradol and flexeril  Bowel/Bladder: BS active +gas/+bm (x1). Baltazar removed at around 3pm per order. Voided X2 already, BS of 58cc.   Drains: PIV infusing LR 75ml/hr  Wounds/incisions: X5 lap sites SHAHLA  Diet:Tolerating fulls, low appetite, denies N/V  Number of times OUT OF BED this shift: Ambulated X2 this afternoon  Tests/Procedures: N/A  Anticipated  DC Date: Pending  Significant Information: CMS intact. LS clear and IS encouraged.

## 2025-05-20 VITALS
TEMPERATURE: 99 F | RESPIRATION RATE: 16 BRPM | SYSTOLIC BLOOD PRESSURE: 101 MMHG | HEART RATE: 69 BPM | WEIGHT: 132.3 LBS | OXYGEN SATURATION: 98 % | DIASTOLIC BLOOD PRESSURE: 56 MMHG | HEIGHT: 60 IN | BODY MASS INDEX: 25.97 KG/M2

## 2025-05-20 LAB
ANION GAP SERPL CALCULATED.3IONS-SCNC: 5 MMOL/L (ref 7–15)
BUN SERPL-MCNC: 8 MG/DL (ref 8–23)
CALCIUM SERPL-MCNC: 8.7 MG/DL (ref 8.8–10.4)
CHLORIDE SERPL-SCNC: 106 MMOL/L (ref 98–107)
CREAT SERPL-MCNC: 0.84 MG/DL (ref 0.51–0.95)
EGFRCR SERPLBLD CKD-EPI 2021: 79 ML/MIN/1.73M2
GLUCOSE SERPL-MCNC: 94 MG/DL (ref 70–99)
HCO3 SERPL-SCNC: 31 MMOL/L (ref 22–29)
HGB BLD-MCNC: 11.3 G/DL (ref 11.7–15.7)
MCV RBC AUTO: 90 FL (ref 78–100)
POTASSIUM SERPL-SCNC: 4.4 MMOL/L (ref 3.4–5.3)
SODIUM SERPL-SCNC: 142 MMOL/L (ref 135–145)

## 2025-05-20 PROCEDURE — 250N000013 HC RX MED GY IP 250 OP 250 PS 637: Performed by: COLON & RECTAL SURGERY

## 2025-05-20 PROCEDURE — 36415 COLL VENOUS BLD VENIPUNCTURE: CPT

## 2025-05-20 PROCEDURE — 82310 ASSAY OF CALCIUM: CPT

## 2025-05-20 PROCEDURE — 250N000011 HC RX IP 250 OP 636: Performed by: COLON & RECTAL SURGERY

## 2025-05-20 PROCEDURE — 85018 HEMOGLOBIN: CPT

## 2025-05-20 PROCEDURE — 258N000003 HC RX IP 258 OP 636: Performed by: COLON & RECTAL SURGERY

## 2025-05-20 RX ORDER — POLYETHYLENE GLYCOL 3350 17 G/17G
1 POWDER, FOR SOLUTION ORAL DAILY
Qty: 510 G | Refills: 0 | Status: SHIPPED | OUTPATIENT
Start: 2025-05-20

## 2025-05-20 RX ORDER — IBUPROFEN 600 MG/1
600 TABLET, FILM COATED ORAL EVERY 6 HOURS PRN
Qty: 60 TABLET | Refills: 0 | Status: SHIPPED | OUTPATIENT
Start: 2025-05-20

## 2025-05-20 RX ORDER — METHOCARBAMOL 500 MG/1
500 TABLET, FILM COATED ORAL EVERY 6 HOURS PRN
Qty: 40 TABLET | Refills: 0 | Status: SHIPPED | OUTPATIENT
Start: 2025-05-20

## 2025-05-20 RX ORDER — AMOXICILLIN 250 MG
1 CAPSULE ORAL DAILY PRN
Qty: 5 TABLET | Refills: 0 | Status: SHIPPED | OUTPATIENT
Start: 2025-05-20 | End: 2025-06-03

## 2025-05-20 RX ORDER — OXYCODONE HYDROCHLORIDE 5 MG/1
5 TABLET ORAL EVERY 6 HOURS PRN
Qty: 5 TABLET | Refills: 0 | Status: SHIPPED | OUTPATIENT
Start: 2025-05-20 | End: 2025-06-03

## 2025-05-20 RX ADMIN — HEPARIN SODIUM 5000 UNITS: 5000 INJECTION, SOLUTION INTRAVENOUS; SUBCUTANEOUS at 05:26

## 2025-05-20 RX ADMIN — CYCLOBENZAPRINE 10 MG: 10 TABLET, FILM COATED ORAL at 09:20

## 2025-05-20 RX ADMIN — BUPROPION HYDROCHLORIDE 300 MG: 150 TABLET, EXTENDED RELEASE ORAL at 09:20

## 2025-05-20 RX ADMIN — KETOROLAC TROMETHAMINE 15 MG: 15 INJECTION, SOLUTION INTRAMUSCULAR; INTRAVENOUS at 03:07

## 2025-05-20 RX ADMIN — IBUPROFEN 600 MG: 600 TABLET ORAL at 14:59

## 2025-05-20 RX ADMIN — KETOROLAC TROMETHAMINE 15 MG: 15 INJECTION, SOLUTION INTRAMUSCULAR; INTRAVENOUS at 09:20

## 2025-05-20 RX ADMIN — SIMETHICONE 80 MG: 80 TABLET, CHEWABLE ORAL at 13:25

## 2025-05-20 RX ADMIN — HEPARIN SODIUM 5000 UNITS: 5000 INJECTION, SOLUTION INTRAVENOUS; SUBCUTANEOUS at 13:25

## 2025-05-20 RX ADMIN — PSYLLIUM HUSK 1 PACKET: 3.4 POWDER ORAL at 09:26

## 2025-05-20 RX ADMIN — SODIUM CHLORIDE, SODIUM LACTATE, POTASSIUM CHLORIDE, AND CALCIUM CHLORIDE: .6; .31; .03; .02 INJECTION, SOLUTION INTRAVENOUS at 03:29

## 2025-05-20 RX ADMIN — CYCLOBENZAPRINE 10 MG: 10 TABLET, FILM COATED ORAL at 15:15

## 2025-05-20 ASSESSMENT — ACTIVITIES OF DAILY LIVING (ADL)
ADLS_ACUITY_SCORE: 23
ADLS_ACUITY_SCORE: 22
ADLS_ACUITY_SCORE: 23
ADLS_ACUITY_SCORE: 23
ADLS_ACUITY_SCORE: 22
ADLS_ACUITY_SCORE: 22
ADLS_ACUITY_SCORE: 23
ADLS_ACUITY_SCORE: 23
ADLS_ACUITY_SCORE: 22
ADLS_ACUITY_SCORE: 23
ADLS_ACUITY_SCORE: 22

## 2025-05-20 NOTE — PLAN OF CARE
Goal Outcome Evaluation:      Plan of Care Reviewed With: patient    Overall Patient Progress: improvingOverall Patient Progress: improving         Date & Time: 5/19-20/25 1492-6958  Surgery/POD#: POD 0-1 Robotic ventral rectopexy w/mesh  Behavior & Aggression: Green  Fall Risk: Yes  Orientation:A&Ox4  ABNL VS/O2: VSS on Ra, refused capno  ABNL Labs: See results  Pain Management: Scheduled toradol and flexeril. Refused scheduled tylenol  Bowel/Bladder: BS hypo -gas/-bm yet. Void in BR, good OP  Drains: PIV infusing LR 75ml/hr  Wounds/incisions: X5 lap sites SHAHLA  Diet:Tolerating fulls, low appetite, denies N/V  Number of times OUT OF BED this shift: Up multiple times to BR, see chart. Pt independent in room  Tests/Procedures: N/A  Anticipated  DC Date: Pending  Significant Information: CMS intact. LS clear and IS encouraged.

## 2025-05-20 NOTE — PLAN OF CARE
Date & Time: 5/20/25 5418-9532    Surgery/POD#: POD 1 Robotic ventral rectopexy w/mesh  Behavior & Aggression: Green  Fall Risk: Yes  Orientation:A&Ox4  ABNL VS/O2: VSS on Ra  ABNL Labs: Hgb 11.3  Pain Management: Scheduled toradol/ibuprofen and flexeril, and simethicone. Refused scheduled tylenol  Bowel/Bladder: BS active +gas/+bm. Void in BR, good output  Drains: PIV SL, removed at discharge  Wounds/incisions: X5 lap sites SHAHLA, abd binder in place  Diet:Tolerating regular diet, denies N/V  Number of times OUT OF BED this shift: Ind in room and halls, up multiple times  Tests/Procedures: N/A  Anticipated  DC Date: Discharge home today  Significant Information: CMS intact. LS clear and IS encouraged. Discharge instructions and medications reviewed with patient. Patient discharged home at approximately 1515.

## 2025-05-21 ENCOUNTER — PATIENT OUTREACH (OUTPATIENT)
Dept: CARE COORDINATION | Facility: CLINIC | Age: 60
End: 2025-05-21
Payer: COMMERCIAL

## 2025-05-21 NOTE — PROGRESS NOTES
[facility]: Transitions of Care Outreach  Chief Complaint   Patient presents with    Clinic Care Coordination - Post Hospital       Most Recent Admission Date: 5/19/2025   Most Recent Admission Diagnosis: Rectal prolapse - K62.3     Most Recent Discharge Date: 5/20/2025   Most Recent Discharge Diagnosis: Acute post-operative pain - G89.18  History of rectopexy - Z98.890     Transitions of Care Assessment    Discharge Assessment  How are you doing now that you are home?: I am doing very well.  How are your symptoms? (Red Flag symptoms escalate to triage hotline per guidelines): Improved  Do you know how to contact your clinic care team if you have future questions or changes to your health status? : Yes  Does the patient have their discharge instructions? : Yes  Does the patient have questions regarding their discharge instructions? : No  Were you started on any new medications or were there changes to any of your previous medications? : Yes  Does the patient have all of their medications?: Yes  Do you have questions regarding any of your medications? : No    Post-op ([facility] CTA Only)  If the patient had a surgery or procedure, do they have any questions for a nurse?: No             Follow up Plan     Discharge Follow-Up  Discharge follow up appointment scheduled in alignment with recommended follow up timeframe or Transitions of Risk Category? (Low = within 30 days; Moderate= within 14 days; High= within 7 days): Yes  Discharge Follow Up Appointment Date: 06/03/25  Discharge Follow Up Appointment Scheduled with?: Specialty Care Provider    Future Appointments   Date Time Provider Department Center   6/3/2025  1:00 PM Krystal Myrick APRN CNP [facility]   6/26/2025 11:45 AM Tanvi Ceron MD [facility]   7/29/2025 12:00 PM Nia Workman PA-C [facility]   10/8/2025 10:00 AM [facility]       Outpatient Plan as outlined on AVS reviewed with  patient.    For any urgent concerns, please contact our 24 hour nurse triage line: [phone] ([phone])       JORGE L Birch  [phone]  [facility]

## 2025-05-22 ENCOUNTER — PATIENT OUTREACH (OUTPATIENT)
Dept: SURGERY | Facility: CLINIC | Age: 60
End: 2025-05-22
Payer: COMMERCIAL

## 2025-05-22 NOTE — PROGRESS NOTES
Post Op Note     Called to check on patient postoperatively after hospital discharge.       Patient is s/p robotic ventral mesh rectopexy with Dr. Tanvi Ceron.   Admitted 5/19 and discharged on 5/20.    Pain is well controlled with ibuprofen, has not needed oxycodone  Patient is eating and drinking normally. Patient is on a regular diet.  Encouraged patient to drink 8-10 glasses of water a day.   Patient is passing flatus, has not yet had a bowel movement but is not uncomfortable, no bloating or distention, no nausea/vomiting. Took dose of miralax yesterday and today. Discussed adding fiber if needed.  Patient is voiding normally and urine is light in color.  Patient is not set up with home care.   Patient Denies nausea and vomiting.  Patient Denies any fevers or chills.  Patient's incision is cdi. Patient reminded NOT to remove any dressings over their incisions.   Patient is on a activity restriction. Lifting 10 pounds for 6 weeks.   Patient Denies needing any forms completed.   Follow up is set up with Krystal Myrick NP on 6/3 and with Dr. Tanvi Ceron on 6/26.   Encouraged the patient to contact the clinic in the meantime with any fevers, chills, nausea, vomiting, dizziness, lightheadedness, uncontrolled pain, changes to the incisions, or with any questions or concerns.  Special Concerns:   - No Lovenox   - Daily Miralax. Senna if taking oxycodone. We talked about Citrucel too, but agreed to start simple.     Patient's questions were answered to their stated satisfaction and they are in agreement with this plan.    PRISCILLA Suarez [phone]  Colon & Rectal Surgery Clinic  [facility]

## 2025-05-24 ENCOUNTER — HEALTH MAINTENANCE LETTER (OUTPATIENT)
Age: 60
End: 2025-05-24

## 2025-06-03 ENCOUNTER — OFFICE VISIT (OUTPATIENT)
Dept: SURGERY | Facility: CLINIC | Age: 60
End: 2025-06-03
Payer: COMMERCIAL

## 2025-06-03 VITALS
OXYGEN SATURATION: 99 % | WEIGHT: 131.4 LBS | BODY MASS INDEX: 25.8 KG/M2 | HEIGHT: 60 IN | DIASTOLIC BLOOD PRESSURE: 84 MMHG | SYSTOLIC BLOOD PRESSURE: 132 MMHG | HEART RATE: 88 BPM

## 2025-06-03 DIAGNOSIS — K62.3 RECTAL PROLAPSE: ICD-10-CM

## 2025-06-03 DIAGNOSIS — Z09 FOLLOW-UP EXAMINATION AFTER COLORECTAL SURGERY: Primary | ICD-10-CM

## 2025-06-03 PROCEDURE — 1126F AMNT PAIN NOTED NONE PRSNT: CPT | Performed by: NURSE PRACTITIONER

## 2025-06-03 PROCEDURE — 3075F SYST BP GE 130 - 139MM HG: CPT | Performed by: NURSE PRACTITIONER

## 2025-06-03 PROCEDURE — 99024 POSTOP FOLLOW-UP VISIT: CPT | Performed by: NURSE PRACTITIONER

## 2025-06-03 PROCEDURE — 3079F DIAST BP 80-89 MM HG: CPT | Performed by: NURSE PRACTITIONER

## 2025-06-03 ASSESSMENT — PAIN SCALES - GENERAL: PAINLEVEL_OUTOF10: NO PAIN (0)

## 2025-06-03 NOTE — PROGRESS NOTES
Colon and Rectal Surgery Postoperative Clinic Note    RE: Margo Chris  : 1965  RAUL: 6/3/2025    Margo Chris is a very pleasant 60 year old female status post robotic ventral rectopexy with mesh and rigid proctoscopy on 25 by Dr. Ceron.     Interval history: Margo has been doing very well. She is having frequent bowel movements (about an hour after eating) and these are soft and easy to pass but a little messy. No fecal incontinence. No significant pain. She has been walking a lot.    Physical Examination: Exam was chaperoned by Sandra Heard MA    /84 (BP Location: Left arm, Patient Position: Sitting, Cuff Size: Adult Regular)   Pulse 88   Ht 5'   Wt 131 lb 6.4 oz   LMP 2018 (Approximate)   SpO2 99%   BMI 25.66 kg/m    General: alert, oriented, in no acute distress, sitting comfortably  HEENT: mucous membranes moist  Abdomen: soft, non distended, incision sites well approximated without erythema or drainage    Assessment/Plan:  60 year old female status post robotic ventral rectopexy with mesh and rigid proctoscopy on 25 by Dr. Ceron. She is recovering well. No significant pain. Having bowel movements easily but these are a little messy. Recommended a daily fiber supplement to bulk up stools some. Is scheduled for follow up at the end of the month with Dr. Ceron and encouraged her to contact the clinic in the meantime with any questions or concerns. Patient's questions were answered to her stated satisfaction and she is in agreement with this plan.     Medical history:  Past Medical History:   Diagnosis Date    Chronic depression     Gilbert's syndrome     Other and unspecified hyperlipidemia     Rectal prolapse        Surgical history:  Past Surgical History:   Procedure Laterality Date    COLONOSCOPY N/A 3/27/2025    Procedure: Colonoscopy;  Surgeon: Debora Hickey MD;  Location:  GI    DAVINCI RECTOPEXY N/A 2025    Procedure: Robotic  ventral mesh rectopexy;  Surgeon: Tanvi Ceron MD;  Location:  OR    HC TOOTH EXTRACTION W/FORCEP      LAPAROSCOPIC ASSISTED HYSTERECTOMY VAGINAL N/A 2/16/2018    Procedure: LAPAROSCOPIC ASSISTED HYSTERECTOMY VAGINAL;  LAPAROSCOPIC ASSISTED VAGINAL HYSTERECTOMY BILATERAL SALPINGECTOMY ;  Surgeon: Hiwot Tim MD;  Location:  SD    LAPAROSCOPIC SALPINGECTOMY Bilateral 2/16/2018    Procedure: LAPAROSCOPIC SALPINGECTOMY;;  Surgeon: Hiwot Tim MD;  Location:  SD       Problem list:    Patient Active Problem List    Diagnosis Date Noted    Rectal prolapse 05/19/2025     Priority: Medium    Hx of obesity 01/13/2025     Priority: Medium    Overweight (BMI 25.0-29.9) 01/13/2025     Priority: Medium    S/P vaginal hysterectomy 02/16/2018     Priority: Medium    Chronic fatigue syndrome      Priority: Medium    Urticaria 03/09/2010     Priority: Medium    Subclinical hypothyroidism 09/10/2009     Priority: Medium    Hyperlipidemia 07/23/2009     Priority: Medium     Problem list name updated by automated process. Provider to review      Abnormal weight gain 07/23/2009     Priority: Medium    Family history of diabetes mellitus 07/23/2009     Priority: Medium    Gilbert syndrome 07/23/2009     Priority: Medium       Medications:  Current Outpatient Medications   Medication Sig Dispense Refill    buPROPion (WELLBUTRIN XL) 300 MG 24 hr tablet Take 1 tablet (300 mg) by mouth every morning. 90 tablet 5    ibuprofen (ADVIL/MOTRIN) 600 MG tablet Take 1 tablet (600 mg) by mouth every 6 hours as needed (pain). 60 tablet 0    methocarbamol (ROBAXIN) 500 MG tablet Take 1 tablet (500 mg) by mouth every 6 hours as needed for muscle spasms or other (postoperative pain). 40 tablet 0    Multiple Vitamins-Minerals (MULTIVITAMIN ADULT) CHEW Take 2 chew tab by mouth daily      polyethylene glycol (MIRALAX) 17 GM/Dose powder Take 17 g (1 Capful) by mouth daily. 510 g 0       Allergies:  Allergies   Allergen Reactions     Food      Apricot, mangos, peaches    Sulfa Antibiotics Rash       Family history:  Family History   Problem Relation Age of Onset    Hypertension Mother     Diabetes Father     Hypertension Father     Lung Cancer Father     Melanoma Father     Skin Cancer Father     Anesthesia Reaction No family hx of     Deep Vein Thrombosis (DVT) No family hx of        Social history:  Social History     Tobacco Use    Smoking status: Never    Smokeless tobacco: Never   Substance Use Topics    Alcohol use: Not Currently     Marital status: .    There are no exam notes on file for this visit.     15 minutes spent on the date of the encounter doing chart review, history and exam, documentation and further activities as noted above.   This is a postop visit.    KEVIN Garcia, NP-C  Colon and Rectal Surgery  [facility]    This note was created using speech recognition software and may contain unintended word substitutions.

## 2025-06-03 NOTE — LETTER
6/3/2025       RE: Margo Chris  [address] [place] [address]     Dear Colleague,    Thank you for referring your patient, Margo Chris, to the [facility] [place] at [facility]. Please see a copy of my visit note below.    Colon and Rectal Surgery Postoperative Clinic Note    RE: Margo Chris  : 1965  RAUL: 6/3/2025    Margo Chris is a very pleasant 60 year old female status post robotic ventral rectopexy with mesh and rigid proctoscopy on 25 by Dr. Ceron.     Interval history: Margo has been doing very well. She is having frequent bowel movements (about an hour after eating) and these are soft and easy to pass but a little messy. No fecal incontinence. No significant pain. She has been walking a lot.    Physical Examination: Exam was chaperoned by Sandra Heard MA    /84 (BP Location: Left arm, Patient Position: Sitting, Cuff Size: Adult Regular)   Pulse 88   Ht 5'   Wt 131 lb 6.4 oz   LMP 2018 (Approximate)   SpO2 99%   BMI 25.66 kg/m    General: alert, oriented, in no acute distress, sitting comfortably  HEENT: mucous membranes moist  Abdomen: soft, non distended, incision sites well approximated without erythema or drainage    Assessment/Plan:  60 year old female status post robotic ventral rectopexy with mesh and rigid proctoscopy on 25 by Dr. Ceron. She is recovering well. No significant pain. Having bowel movements easily but these are a little messy. Recommended a daily fiber supplement to bulk up stools some. Is scheduled for follow up at the end of the month with Dr. Ceron and encouraged her to contact the clinic in the meantime with any questions or concerns. Patient's questions were answered to her stated satisfaction and she is in agreement with this plan.     Medical history:  Past Medical History:   Diagnosis Date     Chronic  depression      Gilbert's syndrome      Other and unspecified hyperlipidemia      Rectal prolapse        Surgical history:  Past Surgical History:   Procedure Laterality Date     COLONOSCOPY N/A 3/27/2025    Procedure: Colonoscopy;  Surgeon: Debora Hickey MD;  Location:  GI     DAVINCI RECTOPEXY N/A 5/19/2025    Procedure: Robotic ventral mesh rectopexy;  Surgeon: Tanvi Ceron MD;  Location:  OR     HC TOOTH EXTRACTION W/FORCEP       LAPAROSCOPIC ASSISTED HYSTERECTOMY VAGINAL N/A 2/16/2018    Procedure: LAPAROSCOPIC ASSISTED HYSTERECTOMY VAGINAL;  LAPAROSCOPIC ASSISTED VAGINAL HYSTERECTOMY BILATERAL SALPINGECTOMY ;  Surgeon: Hiwot Tim MD;  Location:  SD     LAPAROSCOPIC SALPINGECTOMY Bilateral 2/16/2018    Procedure: LAPAROSCOPIC SALPINGECTOMY;;  Surgeon: Hiwot Tim MD;  Location: [facility]       Problem list:    Patient Active Problem List    Diagnosis Date Noted     Rectal prolapse 05/19/2025     Priority: Medium     Hx of obesity 01/13/2025     Priority: Medium     Overweight (BMI 25.0-29.9) 01/13/2025     Priority: Medium     S/P vaginal hysterectomy 02/16/2018     Priority: Medium     Chronic fatigue syndrome      Priority: Medium     Urticaria 03/09/2010     Priority: Medium     Subclinical hypothyroidism 09/10/2009     Priority: Medium     Hyperlipidemia 07/23/2009     Priority: Medium     Problem list name updated by automated process. Provider to review       Abnormal weight gain 07/23/2009     Priority: Medium     Family history of diabetes mellitus 07/23/2009     Priority: Medium     Gilbert syndrome 07/23/2009     Priority: Medium       Medications:  Current Outpatient Medications   Medication Sig Dispense Refill     buPROPion (WELLBUTRIN XL) 300 MG 24 hr tablet Take 1 tablet (300 mg) by mouth every morning. 90 tablet 5     ibuprofen (ADVIL/MOTRIN) 600 MG tablet Take 1 tablet (600 mg) by mouth every 6 hours as needed (pain). 60 tablet 0     methocarbamol (ROBAXIN) 500  MG tablet Take 1 tablet (500 mg) by mouth every 6 hours as needed for muscle spasms or other (postoperative pain). 40 tablet 0     Multiple Vitamins-Minerals (MULTIVITAMIN ADULT) CHEW Take 2 chew tab by mouth daily       polyethylene glycol (MIRALAX) 17 GM/Dose powder Take 17 g (1 Capful) by mouth daily. 510 g 0       Allergies:  Allergies   Allergen Reactions     Food      Apricot, mangos, peaches     Sulfa Antibiotics Rash       Family history:  Family History   Problem Relation Age of Onset     Hypertension Mother      Diabetes Father      Hypertension Father      Lung Cancer Father      Melanoma Father      Skin Cancer Father      Anesthesia Reaction No family hx of      Deep Vein Thrombosis (DVT) No family hx of        Social history:  Social History     Tobacco Use     Smoking status: Never     Smokeless tobacco: Never   Substance Use Topics     Alcohol use: Not Currently     Marital status: .    There are no exam notes on file for this visit.     15 minutes spent on the date of the encounter doing chart review, history and exam, documentation and further activities as noted above.   This is a postop visit.    KEVIN Garcia, NP-C  Colon and Rectal Surgery  [facility]    This note was created using speech recognition software and may contain unintended word substitutions.      Again, thank you for allowing me to participate in the care of your patient.      Sincerely,    KEVIN Garcia CNP

## 2025-06-17 NOTE — PROGRESS NOTES
Colon and Rectal Surgery Clinic Note    RE: Margo Chris.  : 1965.  RAUL: 2025.    Reason for visit: Postoperative visit.    Margo Chris is a 60 year old female who is being evaluated via a billable video visit.      Patient has given verbal consent for Video visit? Yes    HPI: Margo Chris is a 60 year old female who presents today for postoperative visit. Margo has a past medical history of obstructed defecation and rectal prolapse.  She is now status post robotic ventral rectopexy with mesh and proctoscopy on 2025 with myself.  Her postoperative course was unremarkable and she was discharged on postop day 1 tolerating regular diet, ambulating, and urinating without problems.    She saw Krystal Altamirano NP on 2025 for her postoperative visit.  At that time, she had been doing well, though was having more frequent bowel movements that were slightly messy.  She was otherwise doing very well at that time in regards to activity and pain management.  Recommended daily fiber supplement to help bulk stools.    Today she feels well.  She is very happy with her results.  Very occasional ODS symptoms, but otherwise is evacuating much better and also with better bladder function.    Medical history:  Past Medical History:   Diagnosis Date    Chronic depression     Gilbert's syndrome     Other and unspecified hyperlipidemia     Rectal prolapse        Surgical history:  Past Surgical History:   Procedure Laterality Date    COLONOSCOPY N/A 3/27/2025    Procedure: Colonoscopy;  Surgeon: Debora Hickey MD;  Location:  GI    DAVINCI RECTOPEXY N/A 2025    Procedure: Robotic ventral mesh rectopexy;  Surgeon: Tanvi Ceron MD;  Location:  OR     TOOTH EXTRACTION W/FORCEP      LAPAROSCOPIC ASSISTED HYSTERECTOMY VAGINAL N/A 2018    Procedure: LAPAROSCOPIC ASSISTED HYSTERECTOMY VAGINAL;  LAPAROSCOPIC ASSISTED VAGINAL HYSTERECTOMY BILATERAL  SALPINGECTOMY ;  Surgeon: Hiwot Tim MD;  Location: [facility]    LAPAROSCOPIC SALPINGECTOMY Bilateral 2/16/2018    Procedure: LAPAROSCOPIC SALPINGECTOMY;;  Surgeon: Hiwot Tim MD;  Location: [facility]       Problem list:    Patient Active Problem List    Diagnosis Date Noted    Rectal prolapse 05/19/2025     Priority: Medium    Hx of obesity 01/13/2025     Priority: Medium    Overweight (BMI 25.0-29.9) 01/13/2025     Priority: Medium    S/P vaginal hysterectomy 02/16/2018     Priority: Medium    Chronic fatigue syndrome      Priority: Medium    Urticaria 03/09/2010     Priority: Medium    Subclinical hypothyroidism 09/10/2009     Priority: Medium    Hyperlipidemia 07/23/2009     Priority: Medium     Problem list name updated by automated process. Provider to review      Abnormal weight gain 07/23/2009     Priority: Medium    Family history of diabetes mellitus 07/23/2009     Priority: Medium    Gilbert syndrome 07/23/2009     Priority: Medium       Medications:  Current Outpatient Medications   Medication Sig Dispense Refill    buPROPion (WELLBUTRIN XL) 300 MG 24 hr tablet Take 1 tablet (300 mg) by mouth every morning. 90 tablet 5    ibuprofen (ADVIL/MOTRIN) 600 MG tablet Take 1 tablet (600 mg) by mouth every 6 hours as needed (pain). 60 tablet 0    methocarbamol (ROBAXIN) 500 MG tablet Take 1 tablet (500 mg) by mouth every 6 hours as needed for muscle spasms or other (postoperative pain). 40 tablet 0    Multiple Vitamins-Minerals (MULTIVITAMIN ADULT) CHEW Take 2 chew tab by mouth daily      polyethylene glycol (MIRALAX) 17 GM/Dose powder Take 17 g (1 Capful) by mouth daily. 510 g 0       Allergies:  Allergies   Allergen Reactions    Food      Apricot, mangos, peaches    Sulfa Antibiotics Rash       Family history:  Family History   Problem Relation Age of Onset    Hypertension Mother     Diabetes Father     Hypertension Father     Lung Cancer Father     Melanoma Father     Skin Cancer Father     Anesthesia  Reaction No family hx of     Deep Vein Thrombosis (DVT) No family hx of        Social history:  Social History     Tobacco Use    Smoking status: Never    Smokeless tobacco: Never   Substance Use Topics    Alcohol use: Not Currently    Marital status: .    There are no exam notes on file for this visit.     ASSESSMENT    This is a 60 year old female s/p robotic ventral rectopexy with mesh on 5/19/25. Doing great.    All pertinent labs and imaging were personally reviewed by me.      PLAN  - If ODS gets worse, can get PFPT   - PCP referral   - Follow up as needed      Tanvi Ceron MD    Division of Colon & Rectal Surgery  [facility]       Virtual Visit Details    Type of service:  Video Visit     Originating Location (pt. Location): Home    Distant Location (provider location):  On-site  Platform used for Video Visit: AmWell        20 minutes spent on the date of the encounter doing chart review, history, review of imaging, documentation, and further activities as noted above, which includes my time spent on video with the patient/family. This is a postoperative visit.    This note was created using speech recognition software and may contain unintended word substitutions.    Referring provider:  No referring provider defined for this encounter.     Primary Care Provider:  No Ref-Primary, Physician

## 2025-06-26 ENCOUNTER — VIRTUAL VISIT (OUTPATIENT)
Dept: SURGERY | Facility: CLINIC | Age: 60
End: 2025-06-26
Payer: COMMERCIAL

## 2025-06-26 DIAGNOSIS — Z09 FOLLOW-UP EXAMINATION AFTER COLORECTAL SURGERY: Primary | ICD-10-CM

## 2025-06-26 DIAGNOSIS — Z98.890 HISTORY OF RECTOPEXY: ICD-10-CM

## 2025-06-26 NOTE — LETTER
2025      Margo Chris  [address] [place] [address]      Dear Colleague,    Thank you for referring your patient, Margo Chris, to the [facility] [place]. Please see a copy of my visit note below.    Colon and Rectal Surgery Clinic Note    RE: Margo Chris.  : 1965.  RAUL: 2025.    Reason for visit: Postoperative visit.    Margo Chris is a 60 year old female who is being evaluated via a billable video visit.      Patient has given verbal consent for Video visit? Yes    HPI: Margo Chris is a 60 year old female who presents today for postoperative visit. Margo has a past medical history of obstructed defecation and rectal prolapse.  She is now status post robotic ventral rectopexy with mesh and proctoscopy on 2025 with myself.  Her postoperative course was unremarkable and she was discharged on postop day 1 tolerating regular diet, ambulating, and urinating without problems.    She saw Krystal Altamirano NP on 2025 for her postoperative visit.  At that time, she had been doing well, though was having more frequent bowel movements that were slightly messy.  She was otherwise doing very well at that time in regards to activity and pain management.  Recommended daily fiber supplement to help bulk stools.    Today she feels well.  She is very happy with her results.  Very occasional ODS symptoms, but otherwise is evacuating much better and also with better bladder function.    Medical history:  Past Medical History:   Diagnosis Date     Chronic depression      Gilbert's syndrome      Other and unspecified hyperlipidemia      Rectal prolapse        Surgical history:  Past Surgical History:   Procedure Laterality Date     COLONOSCOPY N/A 3/27/2025    Procedure: Colonoscopy;  Surgeon: Debora Hickey MD;  Location:  GI     DAVINCI RECTOPEXY N/A 2025    Procedure: Robotic ventral mesh rectopexy;   Surgeon: Tanvi Ceron MD;  Location:  OR     HC TOOTH EXTRACTION W/FORCEP       LAPAROSCOPIC ASSISTED HYSTERECTOMY VAGINAL N/A 2/16/2018    Procedure: LAPAROSCOPIC ASSISTED HYSTERECTOMY VAGINAL;  LAPAROSCOPIC ASSISTED VAGINAL HYSTERECTOMY BILATERAL SALPINGECTOMY ;  Surgeon: Hiwot Tim MD;  Location:  SD     LAPAROSCOPIC SALPINGECTOMY Bilateral 2/16/2018    Procedure: LAPAROSCOPIC SALPINGECTOMY;;  Surgeon: Hiwot Tim MD;  Location:  SD       Problem list:    Patient Active Problem List    Diagnosis Date Noted     Rectal prolapse 05/19/2025     Priority: Medium     Hx of obesity 01/13/2025     Priority: Medium     Overweight (BMI 25.0-29.9) 01/13/2025     Priority: Medium     S/P vaginal hysterectomy 02/16/2018     Priority: Medium     Chronic fatigue syndrome      Priority: Medium     Urticaria 03/09/2010     Priority: Medium     Subclinical hypothyroidism 09/10/2009     Priority: Medium     Hyperlipidemia 07/23/2009     Priority: Medium     Problem list name updated by automated process. Provider to review       Abnormal weight gain 07/23/2009     Priority: Medium     Family history of diabetes mellitus 07/23/2009     Priority: Medium     Gilbert syndrome 07/23/2009     Priority: Medium       Medications:  Current Outpatient Medications   Medication Sig Dispense Refill     buPROPion (WELLBUTRIN XL) 300 MG 24 hr tablet Take 1 tablet (300 mg) by mouth every morning. 90 tablet 5     ibuprofen (ADVIL/MOTRIN) 600 MG tablet Take 1 tablet (600 mg) by mouth every 6 hours as needed (pain). 60 tablet 0     methocarbamol (ROBAXIN) 500 MG tablet Take 1 tablet (500 mg) by mouth every 6 hours as needed for muscle spasms or other (postoperative pain). 40 tablet 0     Multiple Vitamins-Minerals (MULTIVITAMIN ADULT) CHEW Take 2 chew tab by mouth daily       polyethylene glycol (MIRALAX) 17 GM/Dose powder Take 17 g (1 Capful) by mouth daily. 510 g 0       Allergies:  Allergies   Allergen Reactions      Food      Apricot, mangos, peaches     Sulfa Antibiotics Rash       Family history:  Family History   Problem Relation Age of Onset     Hypertension Mother      Diabetes Father      Hypertension Father      Lung Cancer Father      Melanoma Father      Skin Cancer Father      Anesthesia Reaction No family hx of      Deep Vein Thrombosis (DVT) No family hx of        Social history:  Social History     Tobacco Use     Smoking status: Never     Smokeless tobacco: Never   Substance Use Topics     Alcohol use: Not Currently    Marital status: .    There are no exam notes on file for this visit.     ASSESSMENT    This is a 60 year old female s/p robotic ventral rectopexy with mesh on 5/19/25. Doing great.    All pertinent labs and imaging were personally reviewed by me.      PLAN  - If ODS gets worse, can get PFPT   - PCP referral   - Follow up as needed      Tanvi Ceron MD    Division of Colon & Rectal Surgery  [facility]       Virtual Visit Details    Type of service:  Video Visit     Originating Location (pt. Location): Home    Distant Location (provider location):  On-site  Platform used for Video Visit: AmWell        20 minutes spent on the date of the encounter doing chart review, history, review of imaging, documentation, and further activities as noted above, which includes my time spent on video with the patient/family. This is a postoperative visit.    This note was created using speech recognition software and may contain unintended word substitutions.    Referring provider:  No referring provider defined for this encounter.     Primary Care Provider:  No Ref-Primary, Physician      Again, thank you for allowing me to participate in the care of your patient.        Sincerely,        Tanvi Ceron MD    Electronically signed

## 2025-07-29 ENCOUNTER — VIRTUAL VISIT (OUTPATIENT)
Dept: ENDOCRINOLOGY | Facility: CLINIC | Age: 60
End: 2025-07-29
Payer: COMMERCIAL

## 2025-07-29 VITALS — HEIGHT: 60 IN | WEIGHT: 132 LBS | BODY MASS INDEX: 25.91 KG/M2

## 2025-07-29 DIAGNOSIS — E66.3 OVERWEIGHT (BMI 25.0-29.9): Primary | ICD-10-CM

## 2025-07-29 ASSESSMENT — PAIN SCALES - GENERAL: PAINLEVEL_OUTOF10: NO PAIN (0)

## 2025-07-29 NOTE — Clinical Note
This is the patient I was talking about gradual weight gain and further answers. Do you mind reading through this and see if there is anything I am missing/things you would recommend?  Thank you!!!   Nia Workman PA-C [facility]

## 2025-07-29 NOTE — NURSING NOTE
Current patient location: [address] [place] [address]    Is the patient currently in the state of [place]? YES    Visit mode: VIDEO    If the visit is dropped, the patient can be reconnected by:VIDEO VISIT: Text to cell phone:   Telephone Information:   Mobile [phone]    and VIDEO VISIT: Send to e-mail at: [email or web address]    Will anyone else be joining the visit? NO  (If patient encounters technical issues they should call [phone])    Are changes needed to the allergy or medication list? Medications flagged for removal, please review/remove    Patient denies any changes since echeck-in completion and states all information entered during echeck-in remains accurate.    Are refills needed on medications prescribed by this physician? NO, does not believe so     Rooming Documentation:  Questionnaire(s) completed    No other vitals to report today    Reason for visit: SEAN Thomas MA VVF

## 2025-07-29 NOTE — PROGRESS NOTES
Virtual Visit Details    Type of service:  Video Visit     Originating Location (pt. Location): Home    Distant Location (provider location):  Off-site  Platform used for Video Visit: [facility] Medical Weight Management Note     Margo Chris  MRN:  [id number]  :  1965  RAUL:  2025    Dear Physician No Ref-Primary,    I had the pleasure of seeing your patient Margo Chris. She is a 60 year old female who I am continuing to see for treatment of obesity related to:         No data to display                Assessment & Plan   Problem List Items Addressed This Visit       Overweight (BMI 25.0-29.9) - Primary    Since last visit she has gained around 3lb. She has gained around 12lb in the past 2 years. She is very concerned about this, and that she will continue to see this weight gain.     She was previously on Phentermine since around . She felt like this was no longer helping she started to see weight regain from  to . Phentermine was discontinued and trialed Wellbutrin. She feels that the Wellbutrin is helpful, but mostly for mood and some emotional eating. At our last visit in January we added on metformin - she was on 1000mg for around 3m, but felt like it was not helpful. She was diagnosed with rectal prolapse and s/p ventral mesh rectopexy in May. She has recovered well. But since then feels like she has started to see weight gain again and is not weight stable. However, from chart review it looks like she has been around 130lb for the past 2 months.     She is very concerned about her gradual weight gain and what is causing this. She has not made any changes in her diet and exercise. She eats 0523-8068 calories daily. She walks 10k steps daily and bikes in the summer. Discussed concern for insulin resistance, getting these labs and increasing metformin to see if more helpful. However, she declined this today. Also discussed seeing dietitian to see if any small  changes can be made, but she feels good about her food. Discussed doing small walks after each meals. Discussed restarting phentermine, but she did not think it was helpful any more.     We will get a Tannita scale in clinic and see if BMR indicates if any changes in calories are needed. Will continue on wellbutrin and consider adding in naltrexone.              Relevant Orders    Primary Care Referral        Continue Wellbutrin 300mg daily. No refills needed.   Get body composition scale (Tanitta) completed in clinic. From there can look at if any adjustments are needed for calories and macros.   Can consider adding on Naltrexone in the future as well.   PCP referral placed   Nia Us PA-C in 3 months       INTERVAL HISTORY:  Dr. Blackmon - 12/2010 and started Phentermine   Starting weight - 163lb  Last seen by Dr. Blackmon 11/15/2021. First seen by me on 1/2/2023  Continue Phentermine 15-30mg once daily - historically has interchanged dose as needed  Saw around 10lb weight regain and felt like Phentermine was no longer helping. Discontinued Phentermine   Contrave not covered by insurance, and she did not want to persue appeal. Started Wellbutrin   Last seen by me on 1/2025 - continued wellbutrin and added metformin       Since last visit was diagnosed with rectal prolapse and s/p ventral mesh rectopexy. She has recovered well.     She has seen gradaul weight gain over the past 6 months. She is very concerned about this. She has always felt best around 118-120lb. She is up 2 sizes, which is the most concerned about. She feels like she is still gaining weight.     Anti-obesity medication history    Current:   Wellbutrin 300mg - has been really helpful with mood and emotional eating.     Metformin 1000mg - was on it for around 3 months. Saw no effect on weight. No side effects. Discontinued without concern.       Recent diet changes: Eating 4305-2569 calories daily. 50g of protein daily. No change in food. Some  increase in hunger after dinner.     Recent exercise/activity changes: getting 10k steps daily. Bikes in the summer. Was not able to bike when she had her surgery.     CURRENT WEIGHT:   132 lbs 0 oz    Initial Weight (lbs): 163 lbs     Cumulative weight loss (lbs): 31  Weight Loss Percentage: 19.02%    Wt Readings from Last 5 Encounters:   07/29/25 59.9 kg (132 lb)   06/03/25 59.6 kg (131 lb 6.4 oz)   05/19/25 60 kg (132 lb 4.8 oz)   05/01/25 59.4 kg (131 lb)   03/27/25 58.1 kg (128 lb)             7/24/2025     8:47 AM   Changes and Difficulties   I have made the following changes to my diet since my last visit: None   With regards to my diet, I am still struggling with: Not continuing to gain weight   I have made the following changes to my activity/exercise since my last visit: None   With regards to my activity/exercise, I am still struggling with: None         MEDICATIONS:   Current Outpatient Medications   Medication Sig Dispense Refill    buPROPion (WELLBUTRIN XL) 300 MG 24 hr tablet Take 1 tablet (300 mg) by mouth every morning. 90 tablet 5    ibuprofen (ADVIL/MOTRIN) 600 MG tablet Take 1 tablet (600 mg) by mouth every 6 hours as needed (pain). 60 tablet 0    methocarbamol (ROBAXIN) 500 MG tablet Take 1 tablet (500 mg) by mouth every 6 hours as needed for muscle spasms or other (postoperative pain). (Patient not taking: Reported on 6/26/2025) 40 tablet 0    polyethylene glycol (MIRALAX) 17 GM/Dose powder Take 17 g (1 Capful) by mouth daily. (Patient not taking: Reported on 6/26/2025) 510 g 0           7/24/2025     8:47 AM   Weight Loss Medication History Reviewed With Patient   Which weight loss medications are you currently taking on a regular basis? Bupropion   Are you having any side effects from the weight loss medication that we have prescribed you? No         Objective    Ht 1.524 m (5')   Wt 59.9 kg (132 lb)   LMP 01/26/2018 (Approximate)   BMI 25.78 kg/m    Vitals - Patient Reported  Pain Score:  No Pain (0)      Vitals:  No vitals were obtained today due to virtual visit.      PHYSICAL EXAM:  GENERAL: alert and no distress  EYES: Eyes grossly normal to inspection.  No discharge or erythema, or obvious scleral/conjunctival abnormalities.  RESP: No audible wheeze, cough, or visible cyanosis.    SKIN: Visible skin clear. No significant rash, abnormal pigmentation or lesions.  NEURO: Cranial nerves grossly intact.  Mentation and speech appropriate for age.  PSYCH: Appropriate affect, tone, and pace of words        Sincerely,    Nia Workman PA-C      60 minutes spent by me on the date of the encounter doing chart review, history and exam, documentation and further activities per the note    The longitudinal plan of care for the diagnosis(es)/condition(s) as documented were addressed during this visit. Due to the added complexity in care, I will continue to support Margo in the subsequent management and with ongoing continuity of care.

## 2025-07-29 NOTE — LETTER
2025       RE: Margo Chris  [address] [place] [address]     Dear Colleague,    Thank you for referring your patient, Margo Chris, to the [facility] [place] [facility]. Please see a copy of my visit note below.    Virtual Visit Details    Type of service:  Video Visit     Originating Location (pt. Location): Home    Distant Location (provider location):  Off-site  Platform used for Video Visit: [facility] Medical Weight Management Note     Margo Chris  MRN:  [id number]  :  1965  RAUL:  2025    Dear Physician No Ref-Primary,    I had the pleasure of seeing your patient Margo Chris. She is a 60 year old female who I am continuing to see for treatment of obesity related to:         No data to display                Assessment & Plan  Problem List Items Addressed This Visit       Overweight (BMI 25.0-29.9) - Primary    Since last visit she has gained around 3lb. She has gained around 12lb in the past 2 years. She is very concerned about this, and that she will continue to see this weight gain.     She was previously on Phentermine since around . She felt like this was no longer helping she started to see weight regain from  to . Phentermine was discontinued and trialed Wellbutrin. She feels that the Wellbutrin is helpful, but mostly for mood and some emotional eating. At our last visit in January we added on metformin - she was on 1000mg for around 3m, but felt like it was not helpful. She was diagnosed with rectal prolapse and s/p ventral mesh rectopexy in May. She has recovered well. But since then feels like she has started to see weight gain again and is not weight stable. However, from chart review it looks like she has been around 130lb for the past 2 months.     She is very concerned about her gradual weight gain and what is causing this. She  has not made any changes in her diet and exercise. She eats 1841-7668 calories daily. She walks 10k steps daily and bikes in the summer. Discussed concern for insulin resistance, getting these labs and increasing metformin to see if more helpful. However, she declined this today. Also discussed seeing dietitian to see if any small changes can be made, but she feels good about her food. Discussed doing small walks after each meals. Discussed restarting phentermine, but she did not think it was helpful any more.     We will get a Tannita scale in clinic and see if BMR indicates if any changes in calories are needed. Will continue on wellbutrin and consider adding in naltrexone.              Relevant Orders    Primary Care Referral        Continue Wellbutrin 300mg daily. No refills needed.   Get body composition scale (Tanitta) completed in clinic. From there can look at if any adjustments are needed for calories and macros.   Can consider adding on Naltrexone in the future as well.   PCP referral placed   Nia Us PA-C in 3 months       INTERVAL HISTORY:  Dr. Balckmon - 12/2010 and started Phentermine   Starting weight - 163lb  Last seen by Dr. Blackmon 11/15/2021. First seen by me on 1/2/2023  Continue Phentermine 15-30mg once daily - historically has interchanged dose as needed  Saw around 10lb weight regain and felt like Phentermine was no longer helping. Discontinued Phentermine   Contrave not covered by insurance, and she did not want to persue appeal. Started Wellbutrin   Last seen by me on 1/2025 - continued wellbutrin and added metformin       Since last visit was diagnosed with rectal prolapse and s/p ventral mesh rectopexy. She has recovered well.     She has seen gradaul weight gain over the past 6 months. She is very concerned about this. She has always felt best around 118-120lb. She is up 2 sizes, which is the most concerned about. She feels like she is still gaining weight.     Anti-obesity  medication history    Current:   Wellbutrin 300mg - has been really helpful with mood and emotional eating.     Metformin 1000mg - was on it for around 3 months. Saw no effect on weight. No side effects. Discontinued without concern.       Recent diet changes: Eating 4649-1962 calories daily. 50g of protein daily. No change in food. Some increase in hunger after dinner.     Recent exercise/activity changes: getting 10k steps daily. Bikes in the summer. Was not able to bike when she had her surgery.     CURRENT WEIGHT:   132 lbs 0 oz    Initial Weight (lbs): 163 lbs     Cumulative weight loss (lbs): 31  Weight Loss Percentage: 19.02%    Wt Readings from Last 5 Encounters:   07/29/25 59.9 kg (132 lb)   06/03/25 59.6 kg (131 lb 6.4 oz)   05/19/25 60 kg (132 lb 4.8 oz)   05/01/25 59.4 kg (131 lb)   03/27/25 58.1 kg (128 lb)             7/24/2025     8:47 AM   Changes and Difficulties   I have made the following changes to my diet since my last visit: None   With regards to my diet, I am still struggling with: Not continuing to gain weight   I have made the following changes to my activity/exercise since my last visit: None   With regards to my activity/exercise, I am still struggling with: None         MEDICATIONS:   Current Outpatient Medications   Medication Sig Dispense Refill     buPROPion (WELLBUTRIN XL) 300 MG 24 hr tablet Take 1 tablet (300 mg) by mouth every morning. 90 tablet 5     ibuprofen (ADVIL/MOTRIN) 600 MG tablet Take 1 tablet (600 mg) by mouth every 6 hours as needed (pain). 60 tablet 0     methocarbamol (ROBAXIN) 500 MG tablet Take 1 tablet (500 mg) by mouth every 6 hours as needed for muscle spasms or other (postoperative pain). (Patient not taking: Reported on 6/26/2025) 40 tablet 0     polyethylene glycol (MIRALAX) 17 GM/Dose powder Take 17 g (1 Capful) by mouth daily. (Patient not taking: Reported on 6/26/2025) 510 g 0           7/24/2025     8:47 AM   Weight Loss Medication History Reviewed With  Patient   Which weight loss medications are you currently taking on a regular basis? Bupropion   Are you having any side effects from the weight loss medication that we have prescribed you? No         Objective   Ht 1.524 m (5')   Wt 59.9 kg (132 lb)   LMP 01/26/2018 (Approximate)   BMI 25.78 kg/m    Vitals - Patient Reported  Pain Score: No Pain (0)      Vitals:  No vitals were obtained today due to virtual visit.      PHYSICAL EXAM:  GENERAL: alert and no distress  EYES: Eyes grossly normal to inspection.  No discharge or erythema, or obvious scleral/conjunctival abnormalities.  RESP: No audible wheeze, cough, or visible cyanosis.    SKIN: Visible skin clear. No significant rash, abnormal pigmentation or lesions.  NEURO: Cranial nerves grossly intact.  Mentation and speech appropriate for age.  PSYCH: Appropriate affect, tone, and pace of words        Sincerely,    Nia Workman PA-C      60 minutes spent by me on the date of the encounter doing chart review, history and exam, documentation and further activities per the note    The longitudinal plan of care for the diagnosis(es)/condition(s) as documented were addressed during this visit. Due to the added complexity in care, I will continue to support Margo in the subsequent management and with ongoing continuity of care.    Again, thank you for allowing me to participate in the care of your patient.      Sincerely,    Nia Workman PA-C

## 2025-07-30 NOTE — PATIENT INSTRUCTIONS
Visit Plan:     Continue Wellbutrin 300mg daily. No refills needed.   Get body composition scale (Tanitta) completed in clinic. From there can look at if any adjustments are needed for calories and macros.   Can consider adding on Naltrexone in the future as well.   PCP referral placed   Nia Us PA-C in 3 months

## 2025-07-30 NOTE — ASSESSMENT & PLAN NOTE
Since last visit she has gained around 3lb. She has gained around 12lb in the past 2 years. She is very concerned about this, and that she will continue to see this weight gain.     She was previously on Phentermine since around 2010. She felt like this was no longer helping she started to see weight regain from 2023 to 2024. Phentermine was discontinued and trialed Wellbutrin. She feels that the Wellbutrin is helpful, but mostly for mood and some emotional eating. At our last visit in January we added on metformin - she was on 1000mg for around 3m, but felt like it was not helpful. She was diagnosed with rectal prolapse and s/p ventral mesh rectopexy in May. She has recovered well. But since then feels like she has started to see weight gain again and is not weight stable. However, from chart review it looks like she has been around 130lb for the past 2 months.     She is very concerned about her gradual weight gain and what is causing this. She has not made any changes in her diet and exercise. She eats 6456-1700 calories daily. She walks 10k steps daily and bikes in the summer. Discussed concern for insulin resistance, getting these labs and increasing metformin to see if more helpful. However, she declined this today. Also discussed seeing dietitian to see if any small changes can be made, but she feels good about her food. Discussed doing small walks after each meals. Discussed restarting phentermine, but she did not think it was helpful any more.     We will get a Tannita scale in clinic and see if BMR indicates if any changes in calories are needed. Will continue on wellbutrin and consider adding in naltrexone.

## 2025-07-31 ENCOUNTER — TELEPHONE (OUTPATIENT)
Dept: ENDOCRINOLOGY | Facility: CLINIC | Age: 60
End: 2025-07-31
Payer: COMMERCIAL

## 2025-07-31 NOTE — TELEPHONE ENCOUNTER
Left Voicemail (1st Attempt) and Sent [employer] Playbook[employer] (1st Attempt) for the patient to call back and schedule the following:    Appointment type: Return Weight Management  Appointment mode: in-person or Virtual Visit  Provider: Nia Workman PA-C  Return date: Approx. 10/25/25  Specialty phone number: [phone]    Additional Notes:   3 - 4 month follow up

## 2025-08-04 ENCOUNTER — ALLIED HEALTH/NURSE VISIT (OUTPATIENT)
Dept: SURGERY | Facility: CLINIC | Age: 60
End: 2025-08-04
Payer: COMMERCIAL

## 2025-08-04 VITALS — WEIGHT: 128.8 LBS | BODY MASS INDEX: 25.29 KG/M2 | HEIGHT: 60 IN

## 2025-08-04 DIAGNOSIS — Z86.39 HX OF OBESITY: Primary | ICD-10-CM

## 2025-08-04 DIAGNOSIS — E66.3 OVERWEIGHT (BMI 25.0-29.9): ICD-10-CM

## 2025-08-04 PROCEDURE — 99207 PR NO CHARGE NURSE ONLY: CPT

## 2025-08-11 ENCOUNTER — VIRTUAL VISIT (OUTPATIENT)
Dept: INTERNAL MEDICINE | Facility: CLINIC | Age: 60
End: 2025-08-11
Payer: COMMERCIAL

## 2025-08-11 DIAGNOSIS — Z12.31 ENCOUNTER FOR SCREENING MAMMOGRAM FOR BREAST CANCER: Primary | ICD-10-CM

## 2025-08-11 DIAGNOSIS — T78.1XXA GASTROINTESTINAL FOOD SENSITIVITY: ICD-10-CM

## 2025-08-11 DIAGNOSIS — E66.3 OVERWEIGHT (BMI 25.0-29.9): ICD-10-CM

## 2025-08-11 PROCEDURE — 98001 SYNCH AUDIO-VIDEO NEW LOW 30: CPT | Mod: 24 | Performed by: INTERNAL MEDICINE

## 2025-08-11 PROCEDURE — 1126F AMNT PAIN NOTED NONE PRSNT: CPT | Performed by: INTERNAL MEDICINE

## 2025-08-12 ENCOUNTER — PATIENT OUTREACH (OUTPATIENT)
Dept: CARE COORDINATION | Facility: CLINIC | Age: 60
End: 2025-08-12
Payer: COMMERCIAL

## 2025-08-22 ENCOUNTER — ANCILLARY PROCEDURE (OUTPATIENT)
Dept: MAMMOGRAPHY | Facility: CLINIC | Age: 60
End: 2025-08-22
Attending: INTERNAL MEDICINE
Payer: COMMERCIAL

## 2025-08-22 DIAGNOSIS — Z12.31 ENCOUNTER FOR SCREENING MAMMOGRAM FOR BREAST CANCER: ICD-10-CM

## 2025-08-22 PROCEDURE — 77067 SCR MAMMO BI INCL CAD: CPT | Mod: GC | Performed by: RADIOLOGY

## 2025-08-22 PROCEDURE — 77063 BREAST TOMOSYNTHESIS BI: CPT | Mod: GC | Performed by: RADIOLOGY

## (undated) DEVICE — NDL COUNTER 10CT

## (undated) DEVICE — ESU PENCIL W/HOLSTER E2350H

## (undated) DEVICE — DRAPE LAP W/ARMBOARD 29410

## (undated) DEVICE — DRAPE IOBAN INCISE 23X17" 6650EZ

## (undated) DEVICE — DRSG STERI STRIP 1/4X3" R1541

## (undated) DEVICE — DAVINCI HOT SHEARS TIP COVER  400180

## (undated) DEVICE — ANTIFOG SOLUTION SEE SHARP 150M TROCAR SWABS 30978 (COI)

## (undated) DEVICE — SU VICRYL 1 CT-1 27" J341H

## (undated) DEVICE — TUBING CONMED AIRSEAL SMOKE EVAC INSUFFLATION ASM-EVAC

## (undated) DEVICE — KIT PATIENT POSITIONING PIGAZZI LATEX FREE 40580

## (undated) DEVICE — SUCTION MANIFOLD NEPTUNE 2 SYS 4 PORT 0702-020-000

## (undated) DEVICE — SPONGE LAP 4X18" X8415

## (undated) DEVICE — SYR 10ML FINGER CONTROL W/O NDL 309695

## (undated) DEVICE — SU PDS II 3-0 SH 27" Z316H

## (undated) DEVICE — LIGHT HANDLE X2

## (undated) DEVICE — SOL WATER IRRIG 1000ML BOTTLE 2F7114

## (undated) DEVICE — KIT SIGMOIDOSCOPE ENDOSCOPIC LIGHTED 18FR KI613/10

## (undated) DEVICE — SU VICRYL 0 CT-1 CR 8X27" UND JJ41G

## (undated) DEVICE — ENDO TROCAR 11MM VERSASTEP VS101011P

## (undated) DEVICE — LINEN TOWEL PACK X5 5464

## (undated) DEVICE — ENDO TROCAR CONMED AIRSEAL BLADELESS 05X120MM IAS5-120LP

## (undated) DEVICE — PREP DURAPREP 26ML APL 8630

## (undated) DEVICE — DAVINCI XI SEAL UNIVERSAL 5-12MM 470500

## (undated) DEVICE — DAVINCI XI DRAPE COLUMN 470341

## (undated) DEVICE — SPONGE RAY-TEC 4X8" 7318

## (undated) DEVICE — SU PDS II 2-0 CT-2 27"  Z333H

## (undated) DEVICE — SU VICRYL 4-0 PS-2 18" UND J496H

## (undated) DEVICE — PACK SET-UP STD 9102

## (undated) DEVICE — SU PROLENE 3-0 SHDA 48" 8534H

## (undated) DEVICE — SU VICRYL 0 CT-1 27" J340H

## (undated) DEVICE — GOWN LG DISP 9515

## (undated) DEVICE — SYR 03ML LL W/O NDL

## (undated) DEVICE — GLOVE PROTEXIS W/NEU-THERA 7.5  2D73TE75

## (undated) DEVICE — WIPES FOLEY CARE SURESTEP PROVON DFC100

## (undated) DEVICE — PACK DAVINCI GYN SMA15GDFS1

## (undated) DEVICE — SOL NACL 0.9% IRRIG 1000ML BOTTLE 2F7124

## (undated) DEVICE — ENDO TROCAR FIRST ENTRY KII FIOS Z-THRD 05X100MM CTF03

## (undated) DEVICE — DRAPE LEGGINGS 8421

## (undated) DEVICE — NDL INSUFFLATION 13GA 120MM C2201

## (undated) DEVICE — SOL NACL 0.9% IRRIG 1000ML BOTTLE 07138-09

## (undated) DEVICE — HEMOSTAT ABSORBABLE POWDER ARISTA 1GM SM0005-USA

## (undated) DEVICE — DAVINCI XI DRAPE ARM 470015

## (undated) DEVICE — GLOVE PROTEXIS POWDER FREE 7.0 ORTHOPEDIC 2D73ET70

## (undated) DEVICE — SUCTION IRR STRYKERFLOW II W/TIP 250-070-520

## (undated) DEVICE — DAVINCI XI OBTURATOR BLADELESS 8MM 470359

## (undated) DEVICE — GRASPER LAPAROSCOPIC EPIX 5MMX35CM C4130

## (undated) DEVICE — ENDO TROCAR 05MM VERSASTEP VS101005

## (undated) DEVICE — SUCTION IRR TRUMPET VALVE LAP ASU1201

## (undated) DEVICE — TUBING SUCTION 12"X1/4" N612

## (undated) DEVICE — ESU LIGASURE LAPAROSCOPIC BLUNT TIP SEALER 5MMX37CM LF1837

## (undated) DEVICE — ANTIFOG SOLUTION W/FOAM PAD 31142527

## (undated) DEVICE — SU PROLENE 2-0 SHDA 48" 8533H

## (undated) DEVICE — SUCTION CANISTER MEDIVAC LINER 3000ML W/LID 65651-530

## (undated) DEVICE — Device

## (undated) DEVICE — DRAPE SHEET REV FOLD 3/4 9349

## (undated) DEVICE — CATH INTERMITTENT CLEAN-CATH FEMALE 14FR 6" VINYL LF 420614

## (undated) DEVICE — SU MONOCRYL 4-0 PS-2 18" UND Y496G

## (undated) DEVICE — GLOVE PROTEXIS W/NEU-THERA 6.5  2D73TE65

## (undated) DEVICE — NDL INSUFFLATION 14GA STEP S100000

## (undated) DEVICE — SU STRATAFIX PDS PLUS 3-0 SPIRAL SH 15CM SXPP1B420

## (undated) DEVICE — KIT TURNOVER FAIRVIEW SOUTHDALE HALF SP3890

## (undated) DEVICE — SOL NACL 0.9% INJ 1000ML BAG 2B1324X

## (undated) DEVICE — ESU GROUND PAD UNIVERSAL W/O CORD

## (undated) DEVICE — CATH TRAY FOLEY SURESTEP 16FR WDRAIN BAG STLK LATEX A300316A

## (undated) DEVICE — BLADE KNIFE SURG 10 371110

## (undated) DEVICE — CLEANER INST PRE-KLENZ SOAK SHIELD TUBE 6 ML MEDIUM 2D66J4

## (undated) RX ORDER — FENTANYL CITRATE 50 UG/ML
INJECTION, SOLUTION INTRAMUSCULAR; INTRAVENOUS
Status: DISPENSED
Start: 2025-03-27

## (undated) RX ORDER — FENTANYL CITRATE 50 UG/ML
INJECTION, SOLUTION INTRAMUSCULAR; INTRAVENOUS
Status: DISPENSED
Start: 2025-05-19

## (undated) RX ORDER — PROPOFOL 10 MG/ML
INJECTION, EMULSION INTRAVENOUS
Status: DISPENSED
Start: 2018-02-16

## (undated) RX ORDER — FENTANYL CITRATE 0.05 MG/ML
INJECTION, SOLUTION INTRAMUSCULAR; INTRAVENOUS
Status: DISPENSED
Start: 2025-05-19

## (undated) RX ORDER — DEXAMETHASONE SODIUM PHOSPHATE 4 MG/ML
INJECTION, SOLUTION INTRA-ARTICULAR; INTRALESIONAL; INTRAMUSCULAR; INTRAVENOUS; SOFT TISSUE
Status: DISPENSED
Start: 2018-02-16

## (undated) RX ORDER — CEFAZOLIN SODIUM 2 G/100ML
INJECTION, SOLUTION INTRAVENOUS
Status: DISPENSED
Start: 2018-02-16

## (undated) RX ORDER — FENTANYL CITRATE 50 UG/ML
INJECTION, SOLUTION INTRAMUSCULAR; INTRAVENOUS
Status: DISPENSED
Start: 2018-02-16

## (undated) RX ORDER — DEXAMETHASONE SODIUM PHOSPHATE 4 MG/ML
INJECTION, SOLUTION INTRA-ARTICULAR; INTRALESIONAL; INTRAMUSCULAR; INTRAVENOUS; SOFT TISSUE
Status: DISPENSED
Start: 2025-05-19

## (undated) RX ORDER — BUPIVACAINE HYDROCHLORIDE 5 MG/ML
INJECTION, SOLUTION EPIDURAL; INTRACAUDAL; PERINEURAL
Status: DISPENSED
Start: 2025-05-19

## (undated) RX ORDER — BUPIVACAINE HYDROCHLORIDE 2.5 MG/ML
INJECTION, SOLUTION EPIDURAL; INFILTRATION; INTRACAUDAL
Status: DISPENSED
Start: 2018-02-16

## (undated) RX ORDER — ONDANSETRON 2 MG/ML
INJECTION INTRAMUSCULAR; INTRAVENOUS
Status: DISPENSED
Start: 2025-05-19

## (undated) RX ORDER — METRONIDAZOLE 500 MG/100ML
INJECTION, SOLUTION INTRAVENOUS
Status: DISPENSED
Start: 2025-05-19

## (undated) RX ORDER — HEPARIN SODIUM 5000 [USP'U]/.5ML
INJECTION, SOLUTION INTRAVENOUS; SUBCUTANEOUS
Status: DISPENSED
Start: 2025-05-19

## (undated) RX ORDER — HYDROMORPHONE HCL IN WATER/PF 6 MG/30 ML
PATIENT CONTROLLED ANALGESIA SYRINGE INTRAVENOUS
Status: DISPENSED
Start: 2025-05-19

## (undated) RX ORDER — BUPIVACAINE HYDROCHLORIDE AND EPINEPHRINE 5; 5 MG/ML; UG/ML
INJECTION, SOLUTION EPIDURAL; INTRACAUDAL; PERINEURAL
Status: DISPENSED
Start: 2025-05-19

## (undated) RX ORDER — HYDROMORPHONE HYDROCHLORIDE 1 MG/ML
INJECTION, SOLUTION INTRAMUSCULAR; INTRAVENOUS; SUBCUTANEOUS
Status: DISPENSED
Start: 2025-05-19

## (undated) RX ORDER — EPHEDRINE SULFATE 50 MG/ML
INJECTION, SOLUTION INTRAMUSCULAR; INTRAVENOUS; SUBCUTANEOUS
Status: DISPENSED
Start: 2025-05-19

## (undated) RX ORDER — PHENAZOPYRIDINE HYDROCHLORIDE 200 MG/1
TABLET, FILM COATED ORAL
Status: DISPENSED
Start: 2018-02-16

## (undated) RX ORDER — LIDOCAINE HYDROCHLORIDE 20 MG/ML
INJECTION, SOLUTION EPIDURAL; INFILTRATION; INTRACAUDAL; PERINEURAL
Status: DISPENSED
Start: 2018-02-16

## (undated) RX ORDER — HYDROMORPHONE HYDROCHLORIDE 1 MG/ML
INJECTION, SOLUTION INTRAMUSCULAR; INTRAVENOUS; SUBCUTANEOUS
Status: DISPENSED
Start: 2018-02-16

## (undated) RX ORDER — ONDANSETRON 2 MG/ML
INJECTION INTRAMUSCULAR; INTRAVENOUS
Status: DISPENSED
Start: 2018-02-16